# Patient Record
Sex: FEMALE | Race: BLACK OR AFRICAN AMERICAN | Employment: OTHER | URBAN - METROPOLITAN AREA
[De-identification: names, ages, dates, MRNs, and addresses within clinical notes are randomized per-mention and may not be internally consistent; named-entity substitution may affect disease eponyms.]

---

## 2017-01-24 RX ORDER — FLUTICASONE PROPIONATE 44 UG/1
AEROSOL, METERED RESPIRATORY (INHALATION)
Qty: 1 INHALER | Refills: 3 | Status: SHIPPED | OUTPATIENT
Start: 2017-01-24 | End: 2018-02-05 | Stop reason: SDUPTHER

## 2017-01-27 ENCOUNTER — HOSPITAL ENCOUNTER (OUTPATIENT)
Dept: LAB | Age: 62
Discharge: HOME | End: 2017-01-27
Payer: COMMERCIAL

## 2017-01-27 DIAGNOSIS — I10 ESSENTIAL HYPERTENSION WITH GOAL BLOOD PRESSURE LESS THAN 140/90: ICD-10-CM

## 2017-01-27 DIAGNOSIS — Z11.59 NEED FOR HEPATITIS C SCREENING TEST: ICD-10-CM

## 2017-01-27 DIAGNOSIS — E11.9 TYPE 2 DIABETES MELLITUS WITHOUT COMPLICATION (HCC): ICD-10-CM

## 2017-01-27 DIAGNOSIS — E78.00 HYPERCHOLESTEROLEMIA: ICD-10-CM

## 2017-01-27 LAB
ALBUMIN SERPL BCP-MCNC: 3.8 G/DL (ref 3.4–5)
ALBUMIN/GLOB SERPL: 1.1 {RATIO} (ref 0.8–1.7)
ALP SERPL-CCNC: 160 U/L (ref 45–117)
ALT SERPL-CCNC: 52 U/L (ref 13–56)
ANION GAP BLD CALC-SCNC: 12 MMOL/L (ref 3–18)
AST SERPL W P-5'-P-CCNC: 18 U/L (ref 15–37)
BILIRUB SERPL-MCNC: 0.3 MG/DL (ref 0.2–1)
BUN SERPL-MCNC: 28 MG/DL (ref 7–18)
BUN/CREAT SERPL: 28 (ref 12–20)
CALCIUM SERPL-MCNC: 9.4 MG/DL (ref 8.5–10.1)
CHLORIDE SERPL-SCNC: 102 MMOL/L (ref 100–108)
CHOLEST SERPL-MCNC: 233 MG/DL
CO2 SERPL-SCNC: 28 MMOL/L (ref 21–32)
CREAT SERPL-MCNC: 0.99 MG/DL (ref 0.6–1.3)
GLOBULIN SER CALC-MCNC: 3.5 G/DL (ref 2–4)
GLUCOSE SERPL-MCNC: 59 MG/DL (ref 74–99)
HBA1C MFR BLD: 9.9 % (ref 4.2–5.6)
HDLC SERPL-MCNC: 74 MG/DL (ref 40–60)
HDLC SERPL: 3.1 {RATIO} (ref 0–5)
LDLC SERPL CALC-MCNC: 139.6 MG/DL (ref 0–100)
LIPID PROFILE,FLP: ABNORMAL
POTASSIUM SERPL-SCNC: 4.2 MMOL/L (ref 3.5–5.5)
PROT SERPL-MCNC: 7.3 G/DL (ref 6.4–8.2)
SODIUM SERPL-SCNC: 142 MMOL/L (ref 136–145)
TRIGL SERPL-MCNC: 97 MG/DL (ref ?–150)
VLDLC SERPL CALC-MCNC: 19.4 MG/DL

## 2017-01-27 PROCEDURE — 86803 HEPATITIS C AB TEST: CPT | Performed by: INTERNAL MEDICINE

## 2017-01-27 PROCEDURE — 80053 COMPREHEN METABOLIC PANEL: CPT | Performed by: INTERNAL MEDICINE

## 2017-01-27 PROCEDURE — 82043 UR ALBUMIN QUANTITATIVE: CPT | Performed by: INTERNAL MEDICINE

## 2017-01-27 PROCEDURE — 80061 LIPID PANEL: CPT | Performed by: INTERNAL MEDICINE

## 2017-01-27 PROCEDURE — 36415 COLL VENOUS BLD VENIPUNCTURE: CPT | Performed by: INTERNAL MEDICINE

## 2017-01-27 PROCEDURE — 83036 HEMOGLOBIN GLYCOSYLATED A1C: CPT | Performed by: INTERNAL MEDICINE

## 2017-01-28 LAB
CREAT UR-MCNC: 137.36 MG/DL (ref 30–125)
MICROALBUMIN UR-MCNC: 1.2 MG/DL (ref 0–3)
MICROALBUMIN/CREAT UR-RTO: 9 MG/G (ref 0–30)

## 2017-01-30 LAB
HCV AB SER IA-ACNC: 0.03 INDEX
HCV AB SERPL QL IA: NEGATIVE
HCV COMMENT,HCGAC: NORMAL

## 2017-02-06 ENCOUNTER — OFFICE VISIT (OUTPATIENT)
Dept: INTERNAL MEDICINE CLINIC | Age: 62
End: 2017-02-06

## 2017-02-06 VITALS
HEART RATE: 83 BPM | RESPIRATION RATE: 18 BRPM | BODY MASS INDEX: 40.15 KG/M2 | HEIGHT: 65 IN | WEIGHT: 241 LBS | TEMPERATURE: 98.2 F | SYSTOLIC BLOOD PRESSURE: 111 MMHG | DIASTOLIC BLOOD PRESSURE: 53 MMHG | OXYGEN SATURATION: 98 %

## 2017-02-06 DIAGNOSIS — E55.9 VITAMIN D DEFICIENCY: ICD-10-CM

## 2017-02-06 DIAGNOSIS — I10 ESSENTIAL HYPERTENSION: Chronic | ICD-10-CM

## 2017-02-06 DIAGNOSIS — Z79.4 TYPE 2 DIABETES MELLITUS WITHOUT COMPLICATION, WITH LONG-TERM CURRENT USE OF INSULIN (HCC): Primary | Chronic | ICD-10-CM

## 2017-02-06 DIAGNOSIS — E11.9 TYPE 2 DIABETES MELLITUS WITHOUT COMPLICATION, WITH LONG-TERM CURRENT USE OF INSULIN (HCC): Primary | Chronic | ICD-10-CM

## 2017-02-06 DIAGNOSIS — E78.00 HYPERCHOLESTEROLEMIA: Chronic | ICD-10-CM

## 2017-02-06 RX ORDER — AZELASTINE 1 MG/ML
SPRAY, METERED NASAL
COMMUNITY
Start: 2017-01-27 | End: 2018-02-05 | Stop reason: SDUPTHER

## 2017-02-06 NOTE — MR AVS SNAPSHOT
Visit Information Date & Time Provider Department Dept. Phone Encounter #  
 2/6/2017 10:00 AM Everett Higgins MD [facility] [phone] Follow-up Instructions Return in about 6 months (around 8/6/2017) for labs 1 week before. Your Appointments 5/25/2017  8:30 AM  
LAB with Everett Higgins MD  
[facility] (--) Appt Note: cpe 1 year 700 [address],[facility] [address] [id number] [phone]  
  
   
 [address] [place] [address] [place] [address]  
  
    
 6/1/2017 10:00 AM  
COMPLETE PHYSICAL with Everett Higgins MD  
[facility] (--) Appt Note: cpe 1 year 700 [address],[facility] [address] [address]  
[phone]  
  
   
 [address] [place] [address] [place] [address] Upcoming Health Maintenance Date Due DTaP/Tdap/Td series (1 - Tdap) 8/23/1976 ZOSTER VACCINE AGE 60> 8/23/2015 FOOT EXAM Q1 5/18/2016 EYE EXAM RETINAL OR DILATED Q1 6/1/2017 HEMOGLOBIN A1C Q6M 7/27/2017 PAP AKA CERVICAL CYTOLOGY 12/9/2017 MICROALBUMIN Q1 1/27/2018 LIPID PANEL Q1 1/27/2018 BREAST CANCER SCRN MAMMOGRAM 10/12/2018 COLONOSCOPY 10/31/2019 Allergies as of 2/6/2017  Review Complete On: 2/6/2017 By: Everett Higgins MD  
 No Known Allergies Current Immunizations  Reviewed on 10/15/2014 No immunizations on file. Not reviewed this visit You Were Diagnosed With   
  
 Codes Comments Type 2 diabetes mellitus without complication, with long-term current use of insulin (HCC)    -  Primary ICD-10-CM: E11.9, Z79.4 ICD-9-CM: 250.00, V58.67 Hypercholesterolemia     ICD-10-CM: E78.00 ICD-9-CM: 272.0 Essential hypertension     ICD-10-CM: I10 
ICD-9-CM: 401.9 Vitamin D deficiency     ICD-10-CM: E55.9 ICD-9-CM: 268.9 Vitals BP Pulse Temp Resp Height(growth percentile) Weight(growth percentile) 111/53 (BP 1 Location: Left arm, BP Patient Position: Sitting) 83 98.2 °F (36.8 °C) (Oral) 18 5' 5\" (1.651 m) 241 lb (109.3 kg) SpO2 BMI OB Status Smoking Status 98% 40.1 kg/m2 Postmenopausal Never Smoker Vitals History BMI and BSA Data Body Mass Index Body Surface Area  
 40.1 kg/m 2 2.24 m 2 Preferred Pharmacy Pharmacy Name Phone [facility] [address] [facility], [address] Your Updated Medication List  
  
   
This list is accurate as of: 2/6/17 10:35 AM.  Always use your most recent med list.  
  
  
  
  
 albuterol 90 mcg/actuation inhaler Commonly known as:  PROAIR HFA Take 2 Puffs by inhalation every four (4) hours as needed for Wheezing. aspirin 81 mg tablet Take  by mouth daily. atorvastatin 80 mg tablet Commonly known as:  LIPITOR  
TAKE 1 TABLET BY MOUTH DAILY  
  
 azelastine 137 mcg (0.1 %) nasal spray Commonly known as:  ASTELIN  
  
 CALTRATE 600+D PLUS MINERALS 600 mg (1,500 mg)-400 unit Chew Generic drug:  Calcium Carbonate-Vit D3-Min Take 1 Tab by mouth daily. COMBIGAN 0.2-0.5 % Drop ophthalmic solution Generic drug:  brimonidine-timolol  
  
 cpap machine kit  
by Does Not Apply route. 1  Overnight CPAP at 11 CWP with ramp humidifier. 2  Mask: Silva and Pykel Aristides or mask of choice. Supplies 99 month. Please send compliance data [id number]. Diagnosis VIKKI. AHI 17 RDI 18 FLOVENT HFA 44 mcg/actuation inhaler Generic drug:  fluticasone INHALE TWO PUFFS BY MOUTH TWICE DAILY  
  
 lisinopril-hydroCHLOROthiazide 10-12.5 mg per tablet Commonly known as:  PRINZIDE, ZESTORETIC  
TAKE 1 TABELT BY MOUTH TWICE DAILY METANX (ALGAL OIL) 3 mg-35 mg-2 mg -90.314 mg Cap Generic drug:  levomefolate-B6-meB12-algal oil METANX 2.8-2-25 mg Tab Generic drug:  l-methylfolate-vit b12-vit b6 Take 1-2 Tabs by mouth daily as needed. NovoLOG Mix 70-30 100 unit/mL (70-30) injection Generic drug:  insulin aspart protamine/insulin aspart  
by SubCUTAneous route. 54 units SQ in AM and 40 units im PM daily before meals. Prescribed by Lucie Lindsay NP Follow-up Instructions Return in about 6 months (around 8/6/2017) for labs 1 week before. Patient Instructions Diabetes Foot Health: Care Instructions Your Care Instructions When you have diabetes, your feet need extra care and attention. Diabetes can damage the nerve endings and blood vessels in your feet, making you less likely to notice when your feet are injured. Diabetes also limits your body's ability to fight infection and get blood to areas that need it. If you get a minor foot injury, it could become an ulcer or a serious infection. With good foot care, you can prevent most of these problems. Caring for your feet can be quick and easy. Most of the care can be done when you are bathing or getting ready for bed. Follow-up care is a key part of your treatment and safety. Be sure to make and go to all appointments, and call your doctor if you are having problems. Its also a good idea to know your test results and keep a list of the medicines you take. How can you care for yourself at home? · Keep your blood sugar close to normal by watching what and how much you eat, monitoring blood sugar, taking medicines if prescribed, and getting regular exercise. · Do not smoke. Smoking affects blood flow and can make foot problems worse. If you need help quitting, talk to your doctor about stop-smoking programs and medicines. These can increase your chances of quitting for good. · Eat a diet that is low in fats. High fat intake can cause fat to build up in your blood vessels and decrease blood flow. · Inspect your feet daily for blisters, cuts, cracks, or sores. If you cannot see well, use a mirror or have someone help you. · Take care of your feet: ¨ Wash your feet every day. Use warm (not hot) water. Check the water temperature with your wrists or other part of your body, not your feet. ¨ Dry your feet well. Pat them dry. Do not rub the skin on your feet too hard. Dry well between your toes. If the skin on your feet stays moist, bacteria or a fungus can grow, which can lead to infection. ¨ Keep your skin soft. Use moisturizing skin cream to keep the skin on your feet soft and prevent calluses and cracks. But do not put the cream between your toes, and stop using any cream that causes a rash. ¨ Clean underneath your toenails carefully. Do not use a sharp object to clean underneath your toenails. Use the blunt end of a nail file or other rounded tool. ¨ Trim and file your toenails straight across to prevent ingrown toenails. Use a nail clipper, not scissors. Use an emery board to smooth the edges. · Change socks daily. Socks without seams are best, because seams often rub the feet. You can find socks for people with diabetes from specialty catalogs. · Look inside your shoes every day for things like gravel or torn linings, which could cause blisters or sores. · Buy shoes that fit well: 
¨ Look for shoes that have plenty of space around the toes. This helps prevent bunions and blisters. ¨ Try on shoes while wearing the kind of socks you will usually wear with the shoes. ¨ Avoid plastic shoes. They may rub your feet and cause blisters. Good shoes should be made of materials that are flexible and breathable, such as leather or cloth. ¨ Break in new shoes slowly by wearing them for no more than an hour a day for several days. Take extra time to check your feet for red areas, blisters, or other problems after you wear new shoes. · Do not go barefoot. Do not wear sandals, and do not wear shoes with very thin soles. Thin soles are easy to puncture. They also do not protect your feet from hot pavement or cold weather. · Have your doctor check your feet during each visit. If you have a foot problem, see your doctor. Do not try to treat an early foot problem at home. Home remedies or treatments that you can buy without a prescription (such as corn removers) can be harmful. · Always get early treatment for foot problems. A minor irritation can lead to a major problem if not properly cared for early. When should you call for help? Call your doctor now or seek immediate medical care if: 
· You have a foot sore, an ulcer or break in the skin that is not healing after 4 days, bleeding corns or calluses, or an ingrown toenail. · You have blue or black areas, which can mean bruising or blood flow problems. · You have peeling skin or tiny blisters between your toes or cracking or oozing of the skin. · You have a fever for more than 24 hours and a foot sore. · You have new numbness or tingling in your feet that does not go away after you move your feet or change positions. · You have unexplained or unusual swelling of the foot or ankle. Watch closely for changes in your health, and be sure to contact your doctor if: 
· You cannot do proper foot care. Where can you learn more? Go to http://niko-shira.info/. Enter A739 in the search box to learn more about \"Diabetes Foot Health: Care Instructions. \" Current as of: May 23, 2016 Content Version: 11.1 © 3168-9955 [employer]FI. Care instructions adapted under license by [employer] (which disclaims liability or warranty for this information). If you have questions about a medical condition or this instruction, always ask your healthcare professional. [address] any warranty or liability for your use of this information. Introducing [facility]! [facility] introduces EVIIVO patient portal. Now you can access parts of your medical record, email your doctor's office, and request medication refills online. 1. In your internet browser, go to [email or web address] 2. Click on the First Time User? Click Here link in the Sign In box. You will see the New Member Sign Up page. 3. Enter your Little1 Access Code exactly as it appears below. You will not need to use this code after youve completed the sign-up process. If you do not sign up before the expiration date, you must request a new code. · Little1 Access Code: [email or web address] Expires: 5/7/2017 10:01 AM 
 
4. Enter the last four digits of your Social Security Number (xxxx) and Date of Birth (mm/dd/yyyy) as indicated and click Submit. You will be taken to the next sign-up page. 5. Create a [employer] ID. This will be your Little1 login ID and cannot be changed, so think of one that is secure and easy to remember. 6. Create a Little1 password. You can change your password at any time. 7. Enter your Password Reset Question and Answer. This can be used at a later time if you forget your password. 8. Enter your e-mail address. You will receive e-mail notification when new information is available in [address] 9. Click Sign Up. You can now view and download portions of your medical record. 10. Click the Download Summary menu link to download a portable copy of your medical information. If you have questions, please visit the Frequently Asked Questions section of the Little1 website. Remember, Little1 is NOT to be used for urgent needs. For medical emergencies, dial 911. Now available from your iPhone and Android! Please provide this summary of care documentation to your next provider. Your primary care clinician is listed as BEA GENTILE. If you have any questions after today's visit, please call [phone].

## 2017-02-06 NOTE — PATIENT INSTRUCTIONS
Diabetes Foot Health: Care Instructions  Your Care Instructions    When you have diabetes, your feet need extra care and attention. Diabetes can damage the nerve endings and blood vessels in your feet, making you less likely to notice when your feet are injured. Diabetes also limits your body's ability to fight infection and get blood to areas that need it. If you get a minor foot injury, it could become an ulcer or a serious infection. With good foot care, you can prevent most of these problems. Caring for your feet can be quick and easy. Most of the care can be done when you are bathing or getting ready for bed. Follow-up care is a key part of your treatment and safety. Be sure to make and go to all appointments, and call your doctor if you are having problems. Its also a good idea to know your test results and keep a list of the medicines you take. How can you care for yourself at home? · Keep your blood sugar close to normal by watching what and how much you eat, monitoring blood sugar, taking medicines if prescribed, and getting regular exercise. · Do not smoke. Smoking affects blood flow and can make foot problems worse. If you need help quitting, talk to your doctor about stop-smoking programs and medicines. These can increase your chances of quitting for good. · Eat a diet that is low in fats. High fat intake can cause fat to build up in your blood vessels and decrease blood flow. · Inspect your feet daily for blisters, cuts, cracks, or sores. If you cannot see well, use a mirror or have someone help you. · Take care of your feet:  [facility] your feet every day. Use warm (not hot) water. Check the water temperature with your wrists or other part of your body, not your feet. ¨ Dry your feet well. Pat them dry. Do not rub the skin on your feet too hard. Dry well between your toes. If the skin on your feet stays moist, bacteria or a fungus can grow, which can lead to infection.   ¨ Keep your skin soft. Use moisturizing skin cream to keep the skin on your feet soft and prevent calluses and cracks. But do not put the cream between your toes, and stop using any cream that causes a rash. ¨ Clean underneath your toenails carefully. Do not use a sharp object to clean underneath your toenails. Use the blunt end of a nail file or other rounded tool. ¨ Trim and file your toenails straight across to prevent ingrown toenails. Use a nail clipper, not scissors. Use an emery board to smooth the edges. · Change socks daily. Socks without seams are best, because seams often rub the feet. You can find socks for people with diabetes from specialty catalogs. · Look inside your shoes every day for things like gravel or torn linings, which could cause blisters or sores. · Buy shoes that fit well:  ¨ Look for shoes that have plenty of space around the toes. This helps prevent bunions and blisters. ¨ Try on shoes while wearing the kind of socks you will usually wear with the shoes. ¨ Avoid plastic shoes. They may rub your feet and cause blisters. Good shoes should be made of materials that are flexible and breathable, such as leather or cloth. ¨ Break in new shoes slowly by wearing them for no more than an hour a day for several days. Take extra time to check your feet for red areas, blisters, or other problems after you wear new shoes. · Do not go barefoot. Do not wear sandals, and do not wear shoes with very thin soles. Thin soles are easy to puncture. They also do not protect your feet from hot pavement or cold weather. · Have your doctor check your feet during each visit. If you have a foot problem, see your doctor. Do not try to treat an early foot problem at home. Home remedies or treatments that you can buy without a prescription (such as corn removers) can be harmful. · Always get early treatment for foot problems. A minor irritation can lead to a major problem if not properly cared for early.   When should you call for help? Call your doctor now or seek immediate medical care if:  · You have a foot sore, an ulcer or break in the skin that is not healing after 4 days, bleeding corns or calluses, or an ingrown toenail. · You have blue or black areas, which can mean bruising or blood flow problems. · You have peeling skin or tiny blisters between your toes or cracking or oozing of the skin. · You have a fever for more than 24 hours and a foot sore. · You have new numbness or tingling in your feet that does not go away after you move your feet or change positions. · You have unexplained or unusual swelling of the foot or ankle. Watch closely for changes in your health, and be sure to contact your doctor if:  · You cannot do proper foot care. Where can you learn more? Go to http://niko-shira.info/. Enter A739 in the search box to learn more about \"Diabetes Foot Health: Care Instructions. \"  Current as of: May 23, 2016  Content Version: 11.1  © 1921-9000 Healthwise[employer]. Care instructions adapted under license by [employer] (which disclaims liability or warranty for this information). If you have questions about a medical condition or this instruction, always ask your healthcare professional. [address] any warranty or liability for your use of this information.

## 2017-02-06 NOTE — PROGRESS NOTES
Patient is in the office today for a 6 month follow up. Do you have an Advance Directive no  Do you want more information : information was previously given. 1. Have you been to the ER, urgent care clinic since your last visit? Hospitalized since your last visit? No    2. Have you seen or consulted any other health care providers outside of the [address] [place] [address] since your last visit? Include any pap smears or colon screening. Yes, Dr. Gaston Shells ENT, and Dr. Jeevan Herrera.

## 2017-02-07 NOTE — PROGRESS NOTES
Subjective:       Chief Complaint  The patient presents for follow up of diabetes, hypertension and high cholesterol. KRIS Jean is a 64 y.o. female seen for follow up of diabetes. Shealso has hypertension and hyperlipidemia. Diabetes poorly controlled, on multiple meds including, novolog 70/30  Insulin, pt is already followed by Endo, pt compliance is poor with pt missing 3/14 insulin doses/week and probably even more, she continues to struggle to try and lose weight which would help bring down her Hba1c from 9.9 which it is currently, hypertension well controlled, no significant medication side effects noted, on Zestoretic, hyperlipidemia poorly controlled, on Lipitor having leg cramps when she takes it daily. She therefore takes it 3x/week. Pt may be candidate for crestor 5 mg if she cannot get LDL down further with weight loss. Diet and Lifestyle: not attempting to follow a low fat, low cholesterol diet, does not rigorously follow a diabetic diet, exercises sporadically    Home BP Monitoring: is not measured at home. Diabetic Review of Systems - home glucose monitoring: is performed regularly, 3x/day. Other symptoms and concerns: pt needs to work on losing weight to improve her overall health     Vit D deficiency is well controlled on current supplementation. Pt continues to have knee pain due to arthritis. Discussed the patient's BMI with her. The BMI follow up plan is as follows: BMI is out of normal parameters and plan is as follows: I have counseled this patient on diet and exercise regimens. Current Outpatient Prescriptions   Medication Sig    azelastine (ASTELIN) 137 mcg (0.1 %) nasal spray     FLOVENT HFA 44 mcg/actuation inhaler INHALE TWO PUFFS BY MOUTH TWICE DAILY    atorvastatin (LIPITOR) 80 mg tablet TAKE 1 TABLET BY MOUTH DAILY    cpap machine kit by Does Not Apply route. 1  Overnight CPAP at 11 CWP with ramp humidifier.   2  Mask: Silva and Pykel Aristides or mask of choice. Supplies 99 month. Please send compliance data [id number]. Diagnosis VIKKI. AHI 17 RDI 18    METANX, ALGAL OIL, 3 mg-35 mg-2 mg -90.314 mg cap     COMBIGAN 0.2-0.5 % drop ophthalmic solution     lisinopril-hydrochlorothiazide (PRINZIDE, ZESTORETIC) 10-12.5 mg per tablet TAKE 1 TABELT BY MOUTH TWICE DAILY    insulin aspart protamine (NOVOLOG MIX 70-30) 100 unit/mL (70-30) injection by SubCUTAneous route. 54 units SQ in AM and 40 units im PM daily before meals. Prescribed by Alejandro Bran NP    l-methylfolate-vit b12-vit b6 (METANX) 2.8-2-25 mg Tab Take 1-2 Tabs by mouth daily as needed.  Calcium Carbonate-Vit D3-Min (CALTRATE 600+D PLUS MINERALS) 600 mg (1,500 mg)-400 unit Chew Take 1 Tab by mouth daily.  aspirin 81 mg Tab Take  by mouth daily.  albuterol (PROAIR HFA) 90 mcg/actuation inhaler Take 2 Puffs by inhalation every four (4) hours as needed for Wheezing. No current facility-administered medications for this visit.               Review of Systems  Respiratory: negative for dyspnea on exertion  Cardiovascular: negative for chest pain    Objective:     Visit Vitals    /53 (BP 1 Location: Left arm, BP Patient Position: Sitting)    Pulse 83    Temp 98.2 °F (36.8 °C) (Oral)    Resp 18    Ht 5' 5\" (1.651 m)    Wt 241 lb (109.3 kg)    SpO2 98%    BMI 40.1 kg/m2        General appearance - alert, well appearing, and in no distress  Chest - clear to auscultation, no wheezes, rales or rhonchi, symmetric air entry  Heart - normal rate, regular rhythm, normal S1, S2, no murmurs, rubs, clicks or gallops  Extremities - peripheral pulses normal, no pedal edema, no clubbing or cyanosis      Labs:   Lab Results  Component Value Date/Time   Hemoglobin A1c 9.9 01/27/2017 07:58 AM   Glucose 59 01/27/2017 07:58 AM   Microalbumin/Creat ratio (mg/g creat) 9 01/27/2017 07:58 AM   Microalb/Creat ratio (ug/mg creat.) <2.8 09/16/2015 02:48 PM   Microalbumin,urine random 1.20 01/27/2017 07:58 AM   LDL, calculated 139.6 01/27/2017 07:58 AM   Creatinine (POC) 1.5 01/07/2015 09:31 AM   Creatinine 0.99 01/27/2017 07:58 AM      Lab Results  Component Value Date/Time   Cholesterol, total 233 01/27/2017 07:58 AM   HDL Cholesterol 74 01/27/2017 07:58 AM   LDL, calculated 139.6 01/27/2017 07:58 AM   Triglyceride 97 01/27/2017 07:58 AM   CHOL/HDL Ratio 3.1 01/27/2017 07:58 AM       Lab Results   Component Value Date/Time    Sodium 142 01/27/2017 07:58 AM    Potassium 4.2 01/27/2017 07:58 AM    Chloride 102 01/27/2017 07:58 AM    CO2 28 01/27/2017 07:58 AM    Anion gap 12 01/27/2017 07:58 AM    Glucose 59 01/27/2017 07:58 AM    BUN 28 01/27/2017 07:58 AM    Creatinine 0.99 01/27/2017 07:58 AM    BUN/Creatinine ratio 28 01/27/2017 07:58 AM    GFR est AA >60 01/27/2017 07:58 AM    GFR est non-AA 57 01/27/2017 07:58 AM    Calcium 9.4 01/27/2017 07:58 AM    Bilirubin, total 0.3 01/27/2017 07:58 AM    ALT (SGPT) 52 01/27/2017 07:58 AM    AST (SGOT) 18 01/27/2017 07:58 AM    Alk. phosphatase 160 01/27/2017 07:58 AM    Protein, total 7.3 01/27/2017 07:58 AM    Albumin 3.8 01/27/2017 07:58 AM    Globulin 3.5 01/27/2017 07:58 AM    A-G Ratio 1.1 01/27/2017 07:58 AM              Assessment / Plan     Diabetes poorly controlled, on current meds and followed by Endo. Pt will continue to try and lose 10 t 20 lbs, compliance with meds has been poor. Hypertension well controlled, on Zestoretic   Hyperlipidemia poorly controlled, on Lipitor due to poor tolerance. Taking every other day. If not improved by next OV would try crestor 5 mg. ICD-10-CM ICD-9-CM    1. Type 2 diabetes mellitus without complication, with long-term current use of insulin (HCC) E11.9 250.00 HEMOGLOBIN A1C W/O EAG    Z79.4 V58.67    2. Hypercholesterolemia E78.00 272.0 LIPID PANEL   3. Essential hypertension N88 904.0 METABOLIC PANEL, COMPREHENSIVE   4.  Vitamin D deficiency E55.9 268.9 Well controlled on current supplementation VITAMIN D, 25 HYDROXY                Diabetic issues reviewed with her: diabetic diet discussed in detail, and low cholesterol diet, weight control and daily exercise discussed. Follow-up Disposition:  Return in about 6 months (around 8/6/2017) for labs 1 week before. Reviewed plan of care. Patient has provided input and agrees with goals.

## 2017-04-11 ENCOUNTER — TELEPHONE (OUTPATIENT)
Dept: INTERNAL MEDICINE CLINIC | Age: 62
End: 2017-04-11

## 2017-04-11 DIAGNOSIS — Z12.31 ENCOUNTER FOR SCREENING MAMMOGRAM FOR BREAST CANCER: Primary | ICD-10-CM

## 2017-04-12 ENCOUNTER — TELEPHONE (OUTPATIENT)
Dept: INTERNAL MEDICINE CLINIC | Age: 62
End: 2017-04-12

## 2017-04-12 DIAGNOSIS — Z12.31 ENCOUNTER FOR SCREENING MAMMOGRAM FOR BREAST CANCER: Primary | ICD-10-CM

## 2017-04-12 NOTE — TELEPHONE ENCOUNTER
[facility] [employer] states the order that was just sent has an expiration date of 10/10/2017. Scheduling states patient is scheduled for 10/13/2017 and wanted to know if a new order can be sent. Scheduling states she cant use that order because of the expiration date.

## 2017-05-23 LAB — HBA1C MFR BLD HPLC: NORMAL %

## 2017-07-31 ENCOUNTER — HOSPITAL ENCOUNTER (OUTPATIENT)
Dept: LAB | Age: 62
Discharge: HOME | End: 2017-07-31
Payer: COMMERCIAL

## 2017-07-31 DIAGNOSIS — E55.9 VITAMIN D DEFICIENCY: ICD-10-CM

## 2017-07-31 DIAGNOSIS — E11.9 TYPE 2 DIABETES MELLITUS WITHOUT COMPLICATION, WITH LONG-TERM CURRENT USE OF INSULIN (HCC): Chronic | ICD-10-CM

## 2017-07-31 DIAGNOSIS — I10 ESSENTIAL HYPERTENSION: Chronic | ICD-10-CM

## 2017-07-31 DIAGNOSIS — E78.00 HYPERCHOLESTEROLEMIA: Chronic | ICD-10-CM

## 2017-07-31 DIAGNOSIS — Z79.4 TYPE 2 DIABETES MELLITUS WITHOUT COMPLICATION, WITH LONG-TERM CURRENT USE OF INSULIN (HCC): Chronic | ICD-10-CM

## 2017-07-31 LAB
25(OH)D3 SERPL-MCNC: 30.7 NG/ML (ref 30–100)
ALBUMIN SERPL BCP-MCNC: 3.7 G/DL (ref 3.4–5)
ALBUMIN/GLOB SERPL: 0.9 {RATIO} (ref 0.8–1.7)
ALP SERPL-CCNC: 149 U/L (ref 45–117)
ALT SERPL-CCNC: 26 U/L (ref 13–56)
ANION GAP BLD CALC-SCNC: 10 MMOL/L (ref 3–18)
AST SERPL W P-5'-P-CCNC: 17 U/L (ref 15–37)
BILIRUB SERPL-MCNC: 0.3 MG/DL (ref 0.2–1)
BUN SERPL-MCNC: 26 MG/DL (ref 7–18)
BUN/CREAT SERPL: 23 (ref 12–20)
CALCIUM SERPL-MCNC: 9.6 MG/DL (ref 8.5–10.1)
CHLORIDE SERPL-SCNC: 100 MMOL/L (ref 100–108)
CHOLEST SERPL-MCNC: 276 MG/DL
CO2 SERPL-SCNC: 29 MMOL/L (ref 21–32)
CREAT SERPL-MCNC: 1.12 MG/DL (ref 0.6–1.3)
GLOBULIN SER CALC-MCNC: 3.9 G/DL (ref 2–4)
GLUCOSE SERPL-MCNC: 55 MG/DL (ref 74–99)
HBA1C MFR BLD: 9.8 % (ref 4.2–5.6)
HDLC SERPL-MCNC: 74 MG/DL (ref 40–60)
HDLC SERPL: 3.7 {RATIO} (ref 0–5)
LDLC SERPL CALC-MCNC: 167 MG/DL (ref 0–100)
LIPID PROFILE,FLP: ABNORMAL
POTASSIUM SERPL-SCNC: 4.7 MMOL/L (ref 3.5–5.5)
PROT SERPL-MCNC: 7.6 G/DL (ref 6.4–8.2)
SODIUM SERPL-SCNC: 139 MMOL/L (ref 136–145)
TRIGL SERPL-MCNC: 175 MG/DL (ref ?–150)
VLDLC SERPL CALC-MCNC: 35 MG/DL

## 2017-07-31 PROCEDURE — 80053 COMPREHEN METABOLIC PANEL: CPT | Performed by: INTERNAL MEDICINE

## 2017-07-31 PROCEDURE — 82306 VITAMIN D 25 HYDROXY: CPT | Performed by: INTERNAL MEDICINE

## 2017-07-31 PROCEDURE — 83036 HEMOGLOBIN GLYCOSYLATED A1C: CPT | Performed by: INTERNAL MEDICINE

## 2017-07-31 PROCEDURE — 80061 LIPID PANEL: CPT | Performed by: INTERNAL MEDICINE

## 2017-07-31 PROCEDURE — 36415 COLL VENOUS BLD VENIPUNCTURE: CPT | Performed by: INTERNAL MEDICINE

## 2017-08-07 ENCOUNTER — OFFICE VISIT (OUTPATIENT)
Dept: INTERNAL MEDICINE CLINIC | Age: 62
End: 2017-08-07

## 2017-08-07 VITALS
WEIGHT: 240 LBS | RESPIRATION RATE: 18 BRPM | DIASTOLIC BLOOD PRESSURE: 57 MMHG | OXYGEN SATURATION: 97 % | BODY MASS INDEX: 39.99 KG/M2 | TEMPERATURE: 98.2 F | HEIGHT: 65 IN | SYSTOLIC BLOOD PRESSURE: 128 MMHG | HEART RATE: 77 BPM

## 2017-08-07 DIAGNOSIS — E78.00 HYPERCHOLESTEROLEMIA: Chronic | ICD-10-CM

## 2017-08-07 DIAGNOSIS — Z79.4 TYPE 2 DIABETES MELLITUS WITHOUT COMPLICATION, WITH LONG-TERM CURRENT USE OF INSULIN (HCC): Primary | Chronic | ICD-10-CM

## 2017-08-07 DIAGNOSIS — E55.9 VITAMIN D DEFICIENCY: ICD-10-CM

## 2017-08-07 DIAGNOSIS — I10 ESSENTIAL HYPERTENSION: Chronic | ICD-10-CM

## 2017-08-07 DIAGNOSIS — E11.9 TYPE 2 DIABETES MELLITUS WITHOUT COMPLICATION, WITH LONG-TERM CURRENT USE OF INSULIN (HCC): Primary | Chronic | ICD-10-CM

## 2017-08-07 RX ORDER — BETAMETHASONE DIPROPIONATE 0.5 MG/G
CREAM TOPICAL
COMMUNITY
Start: 2017-06-27 | End: 2018-02-05

## 2017-08-07 NOTE — PROGRESS NOTES
Patient is in the office today for a 6 month follow up. 1. Have you been to the ER, urgent care clinic since your last visit? Hospitalized since your last visit? No    2. Have you seen or consulted any other health care providers outside of the [address] [place] [address] since your last visit? Include any pap smears or colon screening. Yes, Dr. Dashawn Garcia OB/GYN.

## 2017-08-07 NOTE — PROGRESS NOTES
Subjective:       Chief Complaint  The patient presents for follow up of diabetes, hypertension and high cholesterol. KRIS Bowers is a 64 y.o. female seen for follow up of diabetes. Shealso has hypertension and hyperlipidemia. Diabetes poorly controlled, on multiple meds including, novolog 70/30  Insulin 50 units QAM and 40 units QPM, pt is already followed by Endo(Dr Schuster), pt compliance is poor with pt missing 3/14 insulin doses/week and probably even more, she continues to struggle to try and lose weight which would help bring down her Hba1c from 9.9 which it is currently, Endo recommends and insulin pump but pt is reluctant to do it, I also advised er to consider Lolaabiodun Tilley, hypertension well controlled, no significant medication side effects noted, on Zestoretic, hyperlipidemia poorly controlled, on Lipitor having leg cramps when she takes it daily. She therefore takes it 3x/week. Pt may be candidate for crestor 5 mg if she cannot get LDL down further with weight loss. Also conisder adding Co Q 10      Diet and Lifestyle: not attempting to follow a low fat, low cholesterol diet, does not rigorously follow a diabetic diet, exercises sporadically    Home BP Monitoring: is not measured at home. Diabetic Review of Systems - home glucose monitoring: is performed regularly, 3x/day. Other symptoms and concerns: pt needs to work on losing weight to improve her overall health     Vit D deficiency is well controlled on current supplementation. Pt continues to have knee pain due to arthritis. Discussed the patient's BMI with her. The BMI follow up plan is as follows: BMI is out of normal parameters and plan is as follows: I have counseled this patient on diet and exercise regimens.       Current Outpatient Prescriptions   Medication Sig    FLOVENT HFA 44 mcg/actuation inhaler INHALE TWO PUFFS BY MOUTH TWICE DAILY    atorvastatin (LIPITOR) 80 mg tablet TAKE 1 TABLET BY MOUTH DAILY  cpap machine kit by Does Not Apply route. 1  Overnight CPAP at 11 CWP with ramp humidifier. 2  Mask: Silva and Pykel Aristides or mask of choice. Supplies 99 month. Please send compliance data [id number]. Diagnosis VIKKI. AHI 17 RDI 18    COMBIGAN 0.2-0.5 % drop ophthalmic solution     lisinopril-hydrochlorothiazide (PRINZIDE, ZESTORETIC) 10-12.5 mg per tablet TAKE 1 TABELT BY MOUTH TWICE DAILY    insulin aspart protamine (NOVOLOG MIX 70-30) 100 unit/mL (70-30) injection by SubCUTAneous route. 54 units SQ in AM and 40 units im PM daily before meals. Prescribed by Brannon Orozco NP    Calcium Carbonate-Vit D3-Min (CALTRATE 600+D PLUS MINERALS) 600 mg (1,500 mg)-400 unit Chew Take 1 Tab by mouth daily.  aspirin 81 mg Tab Take  by mouth daily.  betamethasone dipropionate (DIPROSONE) 0.05 % topical cream     azelastine (ASTELIN) 137 mcg (0.1 %) nasal spray     METANX, ALGAL OIL, 3 mg-35 mg-2 mg -90.314 mg cap     albuterol (PROAIR HFA) 90 mcg/actuation inhaler Take 2 Puffs by inhalation every four (4) hours as needed for Wheezing.  l-methylfolate-vit b12-vit b6 (METANX) 2.8-2-25 mg Tab Take 1-2 Tabs by mouth daily as needed. No current facility-administered medications for this visit.               Review of Systems  Respiratory: negative for dyspnea on exertion  Cardiovascular: negative for chest pain    Objective:     Visit Vitals    /57 (BP 1 Location: Left arm, BP Patient Position: Sitting)    Pulse 77    Temp 98.2 °F (36.8 °C) (Oral)    Resp 18    Ht 5' 5\" (1.651 m)    Wt 240 lb (108.9 kg)    SpO2 97%    BMI 39.94 kg/m2        General appearance - alert, well appearing, and in no distress  Chest - clear to auscultation, no wheezes, rales or rhonchi, symmetric air entry  Heart - normal rate, regular rhythm, normal S1, S2, no murmurs, rubs, clicks or gallops  Extremities - peripheral pulses normal, no pedal edema, no clubbing or cyanosis      Labs:   Lab Results   Component Value Date/Time    Hemoglobin A1c 9.8 07/31/2017 08:09 AM    Hemoglobin A1c 9.9 01/27/2017 07:58 AM    Hemoglobin A1c, External 10.1 % 05/23/2017    Glucose 55 07/31/2017 08:09 AM    Microalbumin/Creat ratio (mg/g creat) 9 01/27/2017 07:58 AM    Microalbumin,urine random 1.20 01/27/2017 07:58 AM    LDL, calculated 167 07/31/2017 08:09 AM    Creatinine, POC 1.5 01/07/2015 09:31 AM    Creatinine 1.12 07/31/2017 08:09 AM      Lab Results   Component Value Date/Time    Cholesterol, total 276 07/31/2017 08:09 AM    HDL Cholesterol 74 07/31/2017 08:09 AM    LDL, calculated 167 07/31/2017 08:09 AM    Triglyceride 175 07/31/2017 08:09 AM    CHOL/HDL Ratio 3.7 07/31/2017 08:09 AM       Lab Results   Component Value Date/Time    Sodium 139 07/31/2017 08:09 AM    Potassium 4.7 07/31/2017 08:09 AM    Chloride 100 07/31/2017 08:09 AM    CO2 29 07/31/2017 08:09 AM    Anion gap 10 07/31/2017 08:09 AM    Glucose 55 07/31/2017 08:09 AM    BUN 26 07/31/2017 08:09 AM    Creatinine 1.12 07/31/2017 08:09 AM    BUN/Creatinine ratio 23 07/31/2017 08:09 AM    GFR est AA 60 07/31/2017 08:09 AM    GFR est non-AA 49 07/31/2017 08:09 AM    Calcium 9.6 07/31/2017 08:09 AM    Bilirubin, total 0.3 07/31/2017 08:09 AM    ALT (SGPT) 26 07/31/2017 08:09 AM    AST (SGOT) 17 07/31/2017 08:09 AM    Alk. phosphatase 149 07/31/2017 08:09 AM    Protein, total 7.6 07/31/2017 08:09 AM    Albumin 3.7 07/31/2017 08:09 AM    Globulin 3.9 07/31/2017 08:09 AM    A-G Ratio 0.9 07/31/2017 08:09 AM              Assessment / Plan     Diabetes poorly controlled, on current meds and followed by Endo. Pt will continue to try and lose 10 to 20 lbs, try to be more compliant and consider Jardiance if not improving  Hypertension well controlled, on Zestoretic   Hyperlipidemia poorly controlled, on Lipitor due to poor tolerance. Taking every other day. If not improved by next OV would try crestor 5 mg and CoQ 10        ICD-10-CM ICD-9-CM    1.  Type 2 diabetes mellitus without complication, with long-term current use of insulin (HCC) E11.9 250.00 HEMOGLOBIN A1C W/O EAG    Z79.4 V58.67 MICROALBUMIN, UR, RAND W/ MICROALBUMIN/CREA RATIO   2. Essential hypertension N88 349.6 METABOLIC PANEL, COMPREHENSIVE   3. Hypercholesterolemia E78.00 272.0 LIPID PANEL   4. Vitamin D deficiency E55.9 268.9 Well controlled on current supplementation VITAMIN D, 25 HYDROXY                Diabetic issues reviewed with her: diabetic diet discussed in detail, and low cholesterol diet, weight control and daily exercise discussed. Follow-up Disposition:  Return in about 6 months (around 2/7/2018) for labs 1 week before. Reviewed plan of care. Patient has provided input and agrees with goals.

## 2017-08-07 NOTE — MR AVS SNAPSHOT
Visit Information Date & Time Provider Department Dept. Phone Encounter #  
 8/7/2017 10:00 AM Neo Hernández MD [facility] at [facility] [id number] Follow-up Instructions Return in about 6 months (around 2/7/2018) for labs 1 week before. Upcoming Health Maintenance Date Due DTaP/Tdap/Td series (1 - Tdap) 8/23/1976 ZOSTER VACCINE AGE 60> 6/23/2015 FOOT EXAM Q1 5/18/2016 EYE EXAM RETINAL OR DILATED Q1 6/1/2017 PAP AKA CERVICAL CYTOLOGY 12/9/2017 MICROALBUMIN Q1 1/27/2018 HEMOGLOBIN A1C Q6M 1/31/2018 LIPID PANEL Q1 7/31/2018 BREAST CANCER SCRN MAMMOGRAM 10/12/2018 COLONOSCOPY 10/31/2019 Allergies as of 8/7/2017  Review Complete On: 8/7/2017 By: Neo Hernández MD  
 No Known Allergies Current Immunizations  Reviewed on 10/15/2014 No immunizations on file. Not reviewed this visit You Were Diagnosed With   
  
 Codes Comments Type 2 diabetes mellitus without complication, with long-term current use of insulin (HCC)    -  Primary ICD-10-CM: E11.9, Z79.4 ICD-9-CM: 250.00, V58.67 Essential hypertension     ICD-10-CM: I10 
ICD-9-CM: 401.9 Hypercholesterolemia     ICD-10-CM: E78.00 ICD-9-CM: 272.0 Vitamin D deficiency     ICD-10-CM: E55.9 ICD-9-CM: 268.9 Vitals BP Pulse Temp Resp Height(growth percentile) Weight(growth percentile) 128/57 (BP 1 Location: Left arm, BP Patient Position: Sitting) 77 98.2 °F (36.8 °C) (Oral) 18 5' 5\" (1.651 m) 240 lb (108.9 kg) SpO2 BMI OB Status Smoking Status 97% 39.94 kg/m2 Postmenopausal Never Smoker Vitals History BMI and BSA Data Body Mass Index Body Surface Area  
 39.94 kg/m 2 2.23 m 2 Preferred Pharmacy Pharm[facility] Name Phone [facility] [address] [facility] Your Updated Medication List  
  
   
 This list is accurate as of: 8/7/17 10:12 AM.  Always use your most recent med list.  
  
  
  
  
 albuterol 90 mcg/actuation inhaler Commonly known as:  PROAIR HFA Take 2 Puffs by inhalation every four (4) hours as needed for Wheezing. aspirin 81 mg tablet Take  by mouth daily. atorvastatin 80 mg tablet Commonly known as:  LIPITOR  
TAKE 1 TABLET BY MOUTH DAILY  
  
 azelastine 137 mcg (0.1 %) nasal spray Commonly known as:  ASTELIN  
  
 betamethasone dipropionate 0.05 % topical cream  
Commonly known as:  DIPROSONE  
  
 CALTRATE 600+D PLUS MINERALS 600 mg (1,500 mg)-400 unit Chew Generic drug:  Calcium Carbonate-Vit D3-Min Take 1 Tab by mouth daily. COMBIGAN 0.2-0.5 % Drop ophthalmic solution Generic drug:  brimonidine-timolol  
  
 cpap machine kit  
by Does Not Apply route. 1  Overnight CPAP at 11 CWP with ramp humidifier. 2  Mask: Silva and Pykel Aristides or mask of choice. Supplies 99 month. Please send compliance data [id number]. Diagnosis VIKKI. AHI 17 RDI 18 FLOVENT HFA 44 mcg/actuation inhaler Generic drug:  fluticasone INHALE TWO PUFFS BY MOUTH TWICE DAILY  
  
 lisinopril-hydroCHLOROthiazide 10-12.5 mg per tablet Commonly known as:  PRINZIDE, ZESTORETIC  
TAKE 1 TABELT BY MOUTH TWICE DAILY METANX (ALGAL OIL) 3 mg-35 mg-2 mg -90.314 mg Cap Generic drug:  levomefolate-B6-meB12-algal oil METANX 2.8-2-25 mg Tab Generic drug:  l-methylfolate-vit b12-vit b6 Take 1-2 Tabs by mouth daily as needed. NovoLOG Mix 70-30 100 unit/mL (70-30) injection Generic drug:  insulin aspart protamine/insulin aspart  
by SubCUTAneous route. 54 units SQ in AM and 40 units im PM daily before meals. Prescribed by Shira Torre NP Follow-up Instructions Return in about 6 months (around 2/7/2018) for labs 1 week before.   
  
To-Do List   
 10/13/2017 11:15 AM  
  Appointment with CODY GARCIA  1 at [facility] ([phone]) OUTSIDE FILMS  - Any outside films related to the study being scheduled should be brought with you on the day of the exam.  If this cannot be done there may be a delay in the reading of the study. MEDICATIONS  - Patient must bring a complete list of all medications currently taking to include prescriptions, over-the-counter meds, herbals, vitamins & any dietary supplements  GENERAL INSTRUCTIONS  - On the day of your exam do not use any bath powder, deodorant or lotions on the armpit area. -Tenderness of breasts may cause an increase of discomfort during procedure. If you are experiencing breast tenderness on the day of your appointment and would like to reschedule, please call [phone]. Patient Instructions Advance Directives: Care Instructions Your Care Instructions An advance directive is a legal way to state your wishes at the end of your life. It tells your family and your doctor what to do if you can no longer say what you want. There are two main types of advance directives. You can change them any time that your wishes change. · A living will tells your family and your doctor your wishes about life support and other treatment. · A durable power of  for health care lets you name a person to make treatment decisions for you when you can't speak for yourself. This person is called a health care agent. If you do not have an advance directive, decisions about your medical care may be made by a doctor or a  who doesn't know you. It may help to think of an advance directive as a gift to the people who care for you. If you have one, they won't have to make tough decisions by themselves. Follow-up care is a key part of your treatment and safety. Be sure to make and go to all appointments, and call your doctor if you are having problems. It's also a good idea to know your test results and keep a list of the medicines you take. How can you care for yourself at home? · Discuss your wishes with your loved ones and your doctor. This way, there are no surprises. · Many states have a unique form. Or you might use a universal form that has been approved by many states. This kind of form can sometimes be completed and stored online. Your electronic copy will then be available wherever you have a connection to the Internet. In most cases, doctors will respect your wishes even if you have a form from a different state. · You don't need a  to do an advance directive. But you may want to get legal advice. · Think about these questions when you prepare an advance directive: ¨ Who do you want to make decisions about your medical care if you are not able to? Many people choose a family member or close friend. ¨ Do you know enough about life support methods that might be used? If not, talk to your doctor so you understand. ¨ What are you most afraid of that might happen? You might be afraid of having pain, losing your independence, or being kept alive by machines. ¨ Where would you prefer to die? Choices include your home, a hospital, or a nursing home. ¨ Would you like to have information about hospice care to support you and your family? ¨ Do you want to donate organs when you die? ¨ Do you want certain Sikhism practices performed before you die? If so, put your wishes in the advance directive. · Read your advance directive every year, and make changes as needed. When should you call for help? Be sure to contact your doctor if you have any questions. Where can you learn more? Go to http://niko-shira.info/. Enter R264 in the search box to learn more about \"Advance Directives: Care Instructions. \" Current as of: November 17, 2016 Content Version: 11.3 © 6824-1202 [employer].  Care instructions adapted under license by [employer] (which disclaims liability or warranty for this information). If you have questions about a medical condition or this instruction, always ask your healthcare professional. [address] any warranty or liability for your use of this information. Introducing [facility] SERVICES! Landon Rothman introduces MOBi-LEARN patient portal. Now you can access parts of your medical record, email your doctor's office, and request medication refills online. 1. In your internet browser, go to [email or web address] 2. Click on the First Time User? Click Here link in the Sign In box. You will see the New Member Sign Up page. 3. Enter your MOBi-LEARN Access Code exactly as it appears below. You will not need to use this code after youve completed the sign-up process. If you do not sign up before the expiration date, you must request a new code. · MOBi-LEARN Access Code: [email or web address] Expires: 11/5/2017 10:12 AM 
 
4. Enter the last four digits of your Social Security Number (xxxx) and Date of Birth (mm/dd/yyyy) as indicated and click Submit. You will be taken to the next sign-up page. 5. Create a MOBi-LEARN ID. This will be your MOBi-LEARN login ID and cannot be changed, so think of one that is secure and easy to remember. 6. Create a MOBi-LEARN password. You can change your password at any time. 7. Enter your Password Reset Question and Answer. This can be used at a later time if you forget your password. 8. Enter your e-mail address. You will receive e-mail notification when new information is available in [address]. 9. Click Sign Up. You can now view and download portions of your medical record. 10. Click the Download Summary menu link to download a portable copy of your medical information. If you have questions, please visit the Frequently Asked Questions section of the MOBi-LEARN website. Remember, MOB[employer]ARN is NOT to be used for urgent needs. For medical emergencies, dial 911. Now available from your iPhone and Android! Please provide this summary of care documentation to your next provider. Your primary care clinician is listed as BEA GENTILE. If you have any questions after today's visit, please call [phone].

## 2017-08-07 NOTE — PATIENT INSTRUCTIONS
Advance Directives: Care Instructions  Your Care Instructions  An advance directive is a legal way to state your wishes at the end of your life. It tells your family and your doctor what to do if you can no longer say what you want. There are two main types of advance directives. You can change them any time that your wishes change. · A living will tells your family and your doctor your wishes about life support and other treatment. · A durable power of  for health care lets you name a person to make treatment decisions for you when you can't speak for yourself. This person is called a health care agent. If you do not have an advance directive, decisions about your medical care may be made by a doctor or a  who doesn't know you. It may help to think of an advance directive as a gift to the people who care for you. If you have one, they won't have to make tough decisions by themselves. Follow-up care is a key part of your treatment and safety. Be sure to make and go to all appointments, and call your doctor if you are having problems. It's also a good idea to know your test results and keep a list of the medicines you take. How can you care for yourself at home? · Discuss your wishes with your loved ones and your doctor. This way, there are no surprises. · Many states have a unique form. Or you might use a universal form that has been approved by many states. This kind of form can sometimes be completed and stored online. Your electronic copy will then be available wherever you have a connection to the Internet. In most cases, doctors will respect your wishes even if you have a form from a different state. · You don't need a  to do an advance directive. But you may want to get legal advice. · Think about these questions when you prepare an advance directive:  ¨ Who do you want to make decisions about your medical care if you are not able to?  Many people choose a family member or close friend. ¨ Do you know enough about life support methods that might be used? If not, talk to your doctor so you understand. ¨ What are you most afraid of that might happen? You might be afraid of having pain, losing your independence, or being kept alive by machines. ¨ Where would you prefer to die? Choices include your home, a hospital, or a nursing home. ¨ Would you like to have information about hospice care to support you and your family? ¨ Do you want to donate organs when you die? ¨ Do you want certain Christianity practices performed before you die? If so, put your wishes in the advance directive. · Read your advance directive every year, and make changes as needed. When should you call for help? Be sure to contact your doctor if you have any questions. Where can you learn more? Go to http://niko-shira.info/. Enter R264 in the search box to learn more about \"Advance Directives: Care Instructions. \"  Current as of: November 17, 2016  Content Version: 11.3  © 9435-2430 [employer]. Care instructions adapted under license by [employer] (which disclaims liability or warranty for this information). If you have questions about a medical condition or this instruction, always ask your healthcare professional. [address] any warranty or liability for your use of this information.

## 2017-10-13 ENCOUNTER — HOSPITAL ENCOUNTER (OUTPATIENT)
Dept: MAMMOGRAPHY | Age: 62
Discharge: HOME | End: 2017-10-13
Attending: INTERNAL MEDICINE
Payer: COMMERCIAL

## 2017-10-13 DIAGNOSIS — Z12.31 ENCOUNTER FOR SCREENING MAMMOGRAM FOR BREAST CANCER: ICD-10-CM

## 2017-10-13 PROCEDURE — 77067 SCR MAMMO BI INCL CAD: CPT

## 2018-01-29 ENCOUNTER — HOSPITAL ENCOUNTER (OUTPATIENT)
Dept: LAB | Age: 63
Discharge: HOME | End: 2018-01-29
Payer: COMMERCIAL

## 2018-01-29 DIAGNOSIS — Z79.4 TYPE 2 DIABETES MELLITUS WITHOUT COMPLICATION, WITH LONG-TERM CURRENT USE OF INSULIN (HCC): Chronic | ICD-10-CM

## 2018-01-29 DIAGNOSIS — I10 ESSENTIAL HYPERTENSION: Chronic | ICD-10-CM

## 2018-01-29 DIAGNOSIS — E55.9 VITAMIN D DEFICIENCY: ICD-10-CM

## 2018-01-29 DIAGNOSIS — E78.00 HYPERCHOLESTEROLEMIA: Chronic | ICD-10-CM

## 2018-01-29 DIAGNOSIS — E11.9 TYPE 2 DIABETES MELLITUS WITHOUT COMPLICATION, WITH LONG-TERM CURRENT USE OF INSULIN (HCC): Chronic | ICD-10-CM

## 2018-01-29 LAB
ALBUMIN SERPL-MCNC: 3.8 G/DL (ref 3.4–5)
ALBUMIN/GLOB SERPL: 1 {RATIO} (ref 0.8–1.7)
ALP SERPL-CCNC: 134 U/L (ref 45–117)
ALT SERPL-CCNC: 26 U/L (ref 13–56)
ANION GAP SERPL CALC-SCNC: 9 MMOL/L (ref 3–18)
AST SERPL-CCNC: 18 U/L (ref 15–37)
BILIRUB SERPL-MCNC: 0.4 MG/DL (ref 0.2–1)
BUN SERPL-MCNC: 27 MG/DL (ref 7–18)
BUN/CREAT SERPL: 23 (ref 12–20)
CALCIUM SERPL-MCNC: 9.9 MG/DL (ref 8.5–10.1)
CHLORIDE SERPL-SCNC: 103 MMOL/L (ref 100–108)
CHOLEST SERPL-MCNC: 258 MG/DL
CO2 SERPL-SCNC: 28 MMOL/L (ref 21–32)
CREAT SERPL-MCNC: 1.18 MG/DL (ref 0.6–1.3)
GLOBULIN SER CALC-MCNC: 3.8 G/DL (ref 2–4)
GLUCOSE SERPL-MCNC: 100 MG/DL (ref 74–99)
HBA1C MFR BLD: 9.5 % (ref 4.2–5.6)
HDLC SERPL-MCNC: 79 MG/DL (ref 40–60)
HDLC SERPL: 3.3 {RATIO} (ref 0–5)
LDLC SERPL CALC-MCNC: 155.2 MG/DL (ref 0–100)
LIPID PROFILE,FLP: ABNORMAL
POTASSIUM SERPL-SCNC: 4.7 MMOL/L (ref 3.5–5.5)
PROT SERPL-MCNC: 7.6 G/DL (ref 6.4–8.2)
SODIUM SERPL-SCNC: 140 MMOL/L (ref 136–145)
TRIGL SERPL-MCNC: 119 MG/DL (ref ?–150)
VLDLC SERPL CALC-MCNC: 23.8 MG/DL

## 2018-01-29 PROCEDURE — 83036 HEMOGLOBIN GLYCOSYLATED A1C: CPT | Performed by: INTERNAL MEDICINE

## 2018-01-29 PROCEDURE — 36415 COLL VENOUS BLD VENIPUNCTURE: CPT | Performed by: INTERNAL MEDICINE

## 2018-01-29 PROCEDURE — 80061 LIPID PANEL: CPT | Performed by: INTERNAL MEDICINE

## 2018-01-29 PROCEDURE — 80053 COMPREHEN METABOLIC PANEL: CPT | Performed by: INTERNAL MEDICINE

## 2018-01-29 PROCEDURE — 82570 ASSAY OF URINE CREATININE: CPT | Performed by: INTERNAL MEDICINE

## 2018-01-29 PROCEDURE — 82306 VITAMIN D 25 HYDROXY: CPT | Performed by: INTERNAL MEDICINE

## 2018-01-30 LAB
25(OH)D3 SERPL-MCNC: 38.3 NG/ML (ref 30–100)
CREAT UR-MCNC: 88.67 MG/DL (ref 30–125)
MICROALBUMIN UR-MCNC: 0.8 MG/DL (ref 0–3)
MICROALBUMIN/CREAT UR-RTO: 9 MG/G (ref 0–30)

## 2018-02-05 ENCOUNTER — OFFICE VISIT (OUTPATIENT)
Dept: FAMILY MEDICINE CLINIC | Age: 63
End: 2018-02-05

## 2018-02-05 VITALS
HEIGHT: 65 IN | WEIGHT: 241 LBS | RESPIRATION RATE: 20 BRPM | OXYGEN SATURATION: 98 % | HEART RATE: 87 BPM | DIASTOLIC BLOOD PRESSURE: 82 MMHG | BODY MASS INDEX: 40.15 KG/M2 | SYSTOLIC BLOOD PRESSURE: 145 MMHG | TEMPERATURE: 98.6 F

## 2018-02-05 DIAGNOSIS — Z79.4 TYPE 2 DIABETES MELLITUS WITHOUT COMPLICATION, WITH LONG-TERM CURRENT USE OF INSULIN (HCC): Primary | Chronic | ICD-10-CM

## 2018-02-05 DIAGNOSIS — I10 ESSENTIAL HYPERTENSION: Chronic | ICD-10-CM

## 2018-02-05 DIAGNOSIS — E11.9 TYPE 2 DIABETES MELLITUS WITHOUT COMPLICATION, WITH LONG-TERM CURRENT USE OF INSULIN (HCC): Primary | Chronic | ICD-10-CM

## 2018-02-05 DIAGNOSIS — E78.00 HYPERCHOLESTEROLEMIA: Chronic | ICD-10-CM

## 2018-02-05 DIAGNOSIS — E55.9 VITAMIN D DEFICIENCY: ICD-10-CM

## 2018-02-05 DIAGNOSIS — J45.30 MILD PERSISTENT ASTHMA WITHOUT COMPLICATION: ICD-10-CM

## 2018-02-05 RX ORDER — ROSUVASTATIN CALCIUM 5 MG/1
5 TABLET, COATED ORAL
Qty: 30 TAB | Refills: 5 | Status: SHIPPED | OUTPATIENT
Start: 2018-02-05 | End: 2018-06-13 | Stop reason: SINTOL

## 2018-02-05 RX ORDER — AZELASTINE 1 MG/ML
2 SPRAY, METERED NASAL 2 TIMES DAILY
Qty: 1 BOTTLE | Refills: 5 | Status: SHIPPED | OUTPATIENT
Start: 2018-02-05

## 2018-02-05 RX ORDER — FLUTICASONE PROPIONATE 44 UG/1
AEROSOL, METERED RESPIRATORY (INHALATION)
Qty: 1 INHALER | Refills: 3 | Status: SHIPPED | OUTPATIENT
Start: 2018-02-05 | End: 2018-11-29 | Stop reason: SDUPTHER

## 2018-02-05 NOTE — PROGRESS NOTES
Patient is in the office today for 6 month follow up diabetes. 1. Have you been to the ER, urgent care clinic since your last visit? Hospitalized since your last visit? No    2. Have you seen or consulted any other health care providers outside of the [address] [place] [address] since your last visit? Include any pap smears or colon screening.  Yes Dr. Lex Finney, GYN cancer specialist.

## 2018-02-05 NOTE — PATIENT INSTRUCTIONS

## 2018-02-05 NOTE — PROGRESS NOTES
Subjective:       Chief Complaint  The patient presents for follow up of diabetes, hypertension and high cholesterol. KRIS Sow is a 58 y.o. female seen for follow up of diabetes. Shealso has hypertension and hyperlipidemia. Diabetes poorly controlled, on multiple meds including, novolog 70/30  Insulin 60 units QAM and 50 units QPM, pt is already followed by [facility](Dr Schuster), concerned about pt's compliance which has been an issue in the past, she continues to struggle to try and lose weight which would help bring down her Hba1c from 9.5 which it is currently, [facility] recommends an insulin pump but pt is reluctant to do it, Jardiance did not help her, hypertension well controlled, no significant medication side effects noted, on Zestoretic, hyperlipidemia poorly controlled, on Lipitor 80 mg having leg cramps when she takes it daily. She therefore takes it 3x/week and probably less often according to LDL. Pt may be candidate for crestor 5 mg and Co Q 10      Diet and Lifestyle: not attempting to follow a low fat, low cholesterol diet, does not rigorously follow a diabetic diet, exercises sporadically    Home BP Monitoring: is not measured at home. Diabetic Review of Systems - home glucose monitoring: is performed regularly, 3x/day. Other symptoms and concerns: pt needs to work on losing weight to improve her overall health     Vit D deficiency is well controlled on current supplementation. Pt continues to have knee pain due to arthritis. Asthma is well controlled on Flovent    Discussed the patient's BMI with her. The BMI follow up plan is as follows: BMI is out of normal parameters and plan is as follows: I have counseled this patient on diet and exercise regimens. Current Outpatient Prescriptions   Medication Sig    azelastine (ASTELIN) 137 mcg (0.1 %) nasal spray 2 Sprays by Both Nostrils route two (2) times a day.     fluticasone (FLOVENT HFA) 44 mcg/actuation inhaler INHALE TWO PUFFS BY MOUTH TWICE DAILY    rosuvastatin (CRESTOR) 5 mg tablet Take 1 Tab by mouth nightly.  METANX, ALGAL OIL, 3 mg-35 mg-2 mg -90.314 mg cap     albuterol (PROAIR HFA) 90 mcg/actuation inhaler Take 2 Puffs by inhalation every four (4) hours as needed for Wheezing.  COMBIGAN 0.2-0.5 % drop ophthalmic solution     lisinopril-hydrochlorothiazide (PRINZIDE, ZESTORETIC) 10-12.5 mg per tablet TAKE 1 TABELT BY MOUTH TWICE DAILY    insulin aspart protamine (NOVOLOG MIX 70-30) 100 unit/mL (70-30) injection by SubCUTAneous route. 54 units SQ in AM and 40 units im PM daily before meals. Prescribed by Eusebio Knox NP    l-methylfolate-vit b12-vit b6 (METANX) 2.8-2-25 mg Tab Take 1-2 Tabs by mouth daily as needed.  Calcium Carbonate-Vit D3-Min (CALTRATE 600+D PLUS MINERALS) 600 mg (1,500 mg)-400 unit Chew Take 1 Tab by mouth daily.  aspirin 81 mg Tab Take  by mouth daily.  cpap machine kit by Does Not Apply route. 1  Overnight CPAP at 11 CWP with ramp humidifier. 2  Mask: Silva and Ramya Aristides or mask of choice. Supplies 99 month. Please send compliance data [id number]. Diagnosis VIKKI. AHI 17 RDI 18     No current facility-administered medications for this visit.               Review of Systems  Respiratory: negative for dyspnea on exertion  Cardiovascular: negative for chest pain    Objective:     Visit Vitals    /82 (BP 1 Location: Left arm, BP Patient Position: Sitting)    Pulse 87    Temp 98.6 °F (37 °C) (Oral)    Resp 20    Ht 5' 5\" (1.651 m)    Wt 241 lb (109.3 kg)    SpO2 98%    BMI 40.1 kg/m2        General appearance - alert, well appearing, and in no distress  Chest - clear to auscultation, no wheezes, rales or rhonchi, symmetric air entry  Heart - normal rate, regular rhythm, normal S1, S2, no murmurs, rubs, clicks or gallops  Extremities - peripheral pulses normal, no pedal edema, no clubbing or cyanosis      Labs:   Lab Results   Component Value Date/Time    Hemoglobin A1c 9.5 01/29/2018 08:13 AM    Hemoglobin A1c 9.8 07/31/2017 08:09 AM    Hemoglobin A1c 9.9 01/27/2017 07:58 AM    Hemoglobin A1c, External 10.1 % 05/23/2017    Hemoglobin A1c, External 10.3 11/03/2016    Glucose 100 01/29/2018 08:13 AM    Microalbumin/Creat ratio (mg/g creat) 9 01/29/2018 08:13 AM    Microalbumin,urine random 0.80 01/29/2018 08:13 AM    LDL, calculated 155.2 01/29/2018 08:13 AM    Creatinine, POC 1.5 01/07/2015 09:31 AM    Creatinine 1.18 01/29/2018 08:13 AM      Lab Results   Component Value Date/Time    Cholesterol, total 258 01/29/2018 08:13 AM    HDL Cholesterol 79 01/29/2018 08:13 AM    LDL, calculated 155.2 01/29/2018 08:13 AM    Triglyceride 119 01/29/2018 08:13 AM    CHOL/HDL Ratio 3.3 01/29/2018 08:13 AM       Lab Results   Component Value Date/Time    Sodium 140 01/29/2018 08:13 AM    Potassium 4.7 01/29/2018 08:13 AM    Chloride 103 01/29/2018 08:13 AM    CO2 28 01/29/2018 08:13 AM    Anion gap 9 01/29/2018 08:13 AM    Glucose 100 01/29/2018 08:13 AM    BUN 27 01/29/2018 08:13 AM    Creatinine 1.18 01/29/2018 08:13 AM    BUN/Creatinine ratio 23 01/29/2018 08:13 AM    GFR est AA 56 01/29/2018 08:13 AM    GFR est non-AA 46 01/29/2018 08:13 AM    Calcium 9.9 01/29/2018 08:13 AM    Bilirubin, total 0.4 01/29/2018 08:13 AM    ALT (SGPT) 26 01/29/2018 08:13 AM    AST (SGOT) 18 01/29/2018 08:13 AM    Alk. phosphatase 134 01/29/2018 08:13 AM    Protein, total 7.6 01/29/2018 08:13 AM    Albumin 3.8 01/29/2018 08:13 AM    Globulin 3.8 01/29/2018 08:13 AM    A-G Ratio 1.0 01/29/2018 08:13 AM              Assessment / Plan     Diabetes poorly controlled, on current meds and followed by Endo. Pt will continue to try and lose 10 to 20 lbs, try to be more compliant. Hypertension well controlled, on Zestoretic   Hyperlipidemia poorly controlled, on Lipitor due to myalgias and poor compliance. Pt will try crestor 5 mg and CoQ 10.  She can decrease Crestor to 5 mg every other day if needed ICD-10-CM ICD-9-CM    1. Type 2 diabetes mellitus without complication, with long-term current use of insulin (HCC) E11.9 250.00 HEMOGLOBIN A1C W/O EAG    Z79.4 V58.67    2. Essential hypertension P13 294.9 METABOLIC PANEL, COMPREHENSIVE   3. Hypercholesterolemia E78.00 272.0 LIPID PANEL   4. Vitamin D deficiency E55.9 268.9 Well controlled on current supplementation VITAMIN D, 25 HYDROXY   5. Mild persistent asthma without complication J60.63 382.14 Well controlled on fluticasone (FLOVENT HFA) 44 mcg/actuation inhaler                Diabetic issues reviewed with her: diabetic diet discussed in detail, and low cholesterol diet, weight control and daily exercise discussed. Follow-up Disposition:  Return in about 4 months (around 6/5/2018) for labs 1 week before. Reviewed plan of care. Patient has provided input and agrees with goals. 1

## 2018-02-05 NOTE — MR AVS SNAPSHOT
Yomaira Fuentes 
 
 
 [address], [place] [address] [address] 
[phone] Patient: Alla Holter MRN: [id number] DJM:[id number] Visit Information Date & Time Provider Department Dept. Phone Encounter #  
 2/5/2018 10:00 AM Joleen Leung, [address] [place] [address] [phone] [id number] Follow-up Instructions Return in about 4 months (around 6/5/2018) for labs 1 week before. Upcoming Health Maintenance Date Due DTaP/Tdap/Td series (1 - Tdap) 8/23/1976 ZOSTER VACCINE AGE 60> 6/23/2015 FOOT EXAM Q1 5/18/2016 EYE EXAM RETINAL OR DILATED Q1 6/1/2017 PAP AKA CERVICAL CYTOLOGY 12/9/2017 HEMOGLOBIN A1C Q6M 7/29/2018 MICROALBUMIN Q1 1/29/2019 LIPID PANEL Q1 1/29/2019 BREAST CANCER SCRN MAMMOGRAM 10/13/2019 COLONOSCOPY 10/31/2019 Allergies as of 2/5/2018  Review Complete On: 2/5/2018 By: Joleen Leung MD  
 No Known Allergies Current Immunizations  Reviewed on 10/15/2014 No immunizations on file. Not reviewed this visit You Were Diagnosed With   
  
 Codes Comments Type 2 diabetes mellitus without complication, with long-term current use of insulin (HCC)    -  Primary ICD-10-CM: E11.9, Z79.4 ICD-9-CM: 250.00, V58.67 Essential hypertension     ICD-10-CM: I10 
ICD-9-CM: 401.9 Hypercholesterolemia     ICD-10-CM: E78.00 ICD-9-CM: 272.0 Vitamin D deficiency     ICD-10-CM: E55.9 ICD-9-CM: 268.9 Mild persistent asthma without complication     RIT-43-GQ: J45.30 ICD-9-CM: 493.90 Vitals BP Pulse Temp Resp Height(growth percentile) Weight(growth percentile) 145/82 (BP 1 Location: Left arm, BP Patient Position: Sitting) 87 98.6 °F (37 °C) (Oral) 20 5' 5\" (1.651 m) 241 lb (109.3 kg) SpO2 BMI OB Status Smoking Status 98% 40.1 kg/m2 Postmenopausal Never Smoker BMI and BSA Data Body Mass Index Body Surface Area  
 40.1 kg/m 2 2.24 m 2 [facility]erred [facility] Phone [facility], [address] [place] [phone] Your Updated Medication List  
  
   
This list is accurate as of: 2/5/18 10:26 AM.  Always use your most recent med list.  
  
  
  
  
 albuterol 90 mcg/actuation inhaler Commonly known as:  PROAIR HFA Take 2 Puffs by inhalation every four (4) hours as needed for Wheezing. aspirin 81 mg tablet Take  by mouth daily. azelastine 137 mcg (0.1 %) nasal spray Commonly known as:  ASTELIN  
2 Sprays by Both Nostrils route two (2) times a day. CALTRATE 600+D PLUS MINERALS 600 mg (1,500 mg)-400 unit Chew Generic drug:  Calcium Carbonate-Vit D3-Min Take 1 Tab by mouth daily. COMBIGAN 0.2-0.5 % Drop ophthalmic solution Generic drug:  brimonidine-timolol  
  
 cpap machine kit  
by Does Not Apply route. 1  Overnight CPAP at 11 CWP with ramp humidifier. 2  Mask: Miranda Aristides or mask of choice. Supplies 99 month. Please send compliance data [id number]. Diagnosis VIKKI. AHI 17 RDI 18  
  
 fluticasone 44 mcg/actuation inhaler Commonly known as:  FLOVENT HFA INHALE TWO PUFFS BY MOUTH TWICE DAILY  
  
 lisinopril-hydroCHLOROthiazide 10-12.5 mg per tablet Commonly known as:  PRINZIDE, ZESTORETIC  
TAKE 1 TABELT BY MOUTH TWICE DAILY METANX (ALGAL OIL) 3 mg-35 mg-2 mg -90.314 mg Cap Generic drug:  levomefolate-B6-meB12-algal oil METANX 2.8-2-25 mg Tab Generic drug:  l-methylfolate-vit b12-vit b6 Take 1-2 Tabs by mouth daily as needed. NovoLOG Mix 70-30 100 unit/mL (70-30) injection Generic drug:  insulin aspart protamine/insulin aspart  
by SubCUTAneous route. 54 units SQ in AM and 40 units im PM daily before meals. Prescribed by Javi Valerio NP  
  
 rosuvastatin 5 mg tablet Commonly known as:  CRESTOR Take 1 Tab by mouth nightly. Prescriptions Sent to Pharmacy Refills azelastine (ASTELIN) 137 mcg (0.1 %) nasal spray 5 Si Sprays by Both Nostrils route two (2) times a day. Class: Normal  
 Pharmacy: [address] [place], [place] [address] [place] [address] Ph #: [phone] Route: Both Nostrils  
 fluticasone (FLOVENT HFA) 44 mcg/actuation inhaler 3 Sig: INHALE TWO PUFFS BY MOUTH TWICE DAILY Class: Normal  
 Pharmacy: [address] [place] [address] Ph #: [phone]  
 rosuvastatin (CRESTOR) 5 mg tablet 5 Sig: Take 1 Tab by mouth nightly. Class: Normal  
 Pharmacy: [address] [place] [address] [address] [place] [address] Ph #: [phone] Route: Oral  
  
Follow-up Instructions Return in about 4 months (around 2018) for labs 1 week before. Patient Instructions Learning About Diabetes Food Guidelines Your Care Instructions Meal planning is important to manage diabetes. It helps keep your blood sugar at a target level (which you set with your doctor). You don't have to eat special foods. You can eat what your family eats, including sweets once in a while. But you do have to pay attention to how often you eat and how much you eat of certain foods. You may want to work with a dietitian or a certified diabetes educator (CDE) to help you plan meals and snacks. A dietitian or CDE can also help you lose weight if that is one of your goals. What should you know about eating carbs? Managing the amount of carbohydrate (carbs) you eat is an important part of healthy meals when you have diabetes. Carbohydrate is found in many foods. · Learn which foods have carbs. And learn the amounts of carbs in different foods. ¨ Bread, cereal, pasta, and rice have about 15 grams of carbs in a serving. A serving is 1 slice of bread (1 ounce), ½ cup of cooked cereal, or 1/3 cup of cooked pasta or rice. ¨ Fruits have 15 grams of carbs in a serving.  A serving is 1 small fresh fruit, such as an apple or orange; ½ of a banana; ½ cup of cooked or canned fruit; ½ cup of fruit juice; 1 cup of melon or raspberries; or 2 tablespoons of dried fruit. ¨ Milk and no-sugar-added yogurt have 15 grams of carbs in a serving. A serving is 1 cup of milk or 2/3 cup of no-sugar-added yogurt. ¨ Starchy vegetables have 15 grams of carbs in a serving. A serving is ½ cup of mashed potatoes or sweet potato; 1 cup winter squash; ½ of a small baked potato; ½ cup of cooked beans; or ½ cup cooked corn or green peas. · Learn how much carbs to eat each day and at each meal. A dietitian or CDE can teach you how to keep track of the amount of carbs you eat. This is called carbohydrate counting. · If you are not sure how to count carbohydrate grams, use the Plate Method to plan meals. It is a good, quick way to make sure that you have a balanced meal. It also helps you spread carbs throughout the day. ¨ Divide your plate by types of foods. Put non-starchy vegetables on half the plate, meat or other protein food on one-quarter of the plate, and a grain or starchy vegetable in the final quarter of the plate. To this you can add a small piece of fruit and 1 cup of milk or yogurt, depending on how many carbs you are supposed to eat at a meal. 
· Try to eat about the same amount of carbs at each meal. Do not \"save up\" your daily allowance of carbs to eat at one meal. 
· Proteins have very little or no carbs per serving. Examples of proteins are beef, chicken, turkey, fish, eggs, tofu, cheese, cottage cheese, and peanut butter. A serving size of meat is 3 ounces, which is about the size of a deck of cards. Examples of meat substitute serving sizes (equal to 1 ounce of meat) are 1/4 cup of cottage cheese, 1 egg, 1 tablespoon of peanut butter, and ½ cup of tofu. How can you eat out and still eat healthy? · Learn to estimate the serving sizes of foods that have carbohydrate.  If you measure food at home, it will be easier to estimate the amount in a serving of restaurant food. · If the meal you order has too much carbohydrate (such as potatoes, corn, or baked beans), ask to have a low-carbohydrate food instead. Ask for a salad or green vegetables. · If you use insulin, check your blood sugar before and after eating out to help you plan how much to eat in the future. · If you eat more carbohydrate at a meal than you had planned, take a walk or do other exercise. This will help lower your blood sugar. What else should you know? · Limit saturated fat, such as the fat from meat and dairy products. This is a healthy choice because people who have diabetes are at higher risk of heart disease. So choose lean cuts of meat and nonfat or low-fat dairy products. Use olive or canola oil instead of butter or shortening when cooking. · Don't skip meals. Your blood sugar may drop too low if you skip meals and take insulin or certain medicines for diabetes. · Check with your doctor before you drink alcohol. Alcohol can cause your blood sugar to drop too low. Alcohol can also cause a bad reaction if you take certain diabetes medicines. Follow-up care is a key part of your treatment and safety. Be sure to make and go to all appointments, and call your doctor if you are having problems. It's also a good idea to know your test results and keep a list of the medicines you take. Where can you learn more? Go to http://niko-shira.info/. Enter P768 in the search box to learn more about \"Learning About Diabetes Food Guidelines. \" Current as of: March 13, 2017 Content Version: 11.4 © 7544-4300 [employer]. Care instructions adapted under license by [employer] (which disclaims liability or warranty for this information).  If you have questions about a medical condition or this instruction, always ask your healthcare professional. [employer] disclaims any warranty or liability for your use of this information. Introducing [facility]! Ulices Lockhart introduces LookUP patient portal. Now you can access parts of your medical record, email your doctor's office, and request medication refills online. 1. In your internet browser, go to [email or web address] 2. Click on the First Time User? Click Here link in the Sign In box. You will see the New Member Sign Up page. 3. Enter your LookUP Access Code exactly as it appears below. You will not need to use this code after youve completed the sign-up process. If you do not sign up before the expiration date, you must request a new code. · LookUP Access Code: [email or web address] Expires: 5/6/2018  9:38 AM 
 
4. Enter the last four digits of your Social Security Number (xxxx) and Date of Birth (mm/dd/yyyy) as indicated and click Submit. You will be taken to the next sign-up page. 5. Create a LookUP ID. This will be your LookUP login ID and cannot be changed, so think of one that is secure and easy to remember. 6. Create a LookUP password. You can change your password at any time. 7. Enter your Password Reset Question and Answer. This can be used at a later time if you forget your password. 8. Enter your e-mail address. You will receive e-mail notification when new information is available in [address] 9. Click Sign Up. You can now view and download portions of your medical record. 10. Click the Download Summary menu link to download a portable copy of your medical information. If you have questions, please visit the Frequently Asked Questions section of the LookUP website. Remember, LookUP is NOT to be used for urgent needs. For medical emergencies, dial 911. Now available from your iPhone and Android! Please provide this summary of care documentation to your next provider. Your primary care clinician is listed as BEA GENTILE. If you have any questions after today's visit, please call [phone].

## 2018-06-06 ENCOUNTER — HOSPITAL ENCOUNTER (OUTPATIENT)
Dept: LAB | Age: 63
Discharge: HOME | End: 2018-06-06
Payer: COMMERCIAL

## 2018-06-06 DIAGNOSIS — E11.9 TYPE 2 DIABETES MELLITUS WITHOUT COMPLICATION, WITH LONG-TERM CURRENT USE OF INSULIN (HCC): Chronic | ICD-10-CM

## 2018-06-06 DIAGNOSIS — E78.00 HYPERCHOLESTEROLEMIA: Chronic | ICD-10-CM

## 2018-06-06 DIAGNOSIS — Z79.4 TYPE 2 DIABETES MELLITUS WITHOUT COMPLICATION, WITH LONG-TERM CURRENT USE OF INSULIN (HCC): Chronic | ICD-10-CM

## 2018-06-06 DIAGNOSIS — I10 ESSENTIAL HYPERTENSION: Chronic | ICD-10-CM

## 2018-06-06 DIAGNOSIS — E55.9 VITAMIN D DEFICIENCY: ICD-10-CM

## 2018-06-06 LAB
ALBUMIN SERPL-MCNC: 3.7 G/DL (ref 3.4–5)
ALBUMIN/GLOB SERPL: 1.1 {RATIO} (ref 0.8–1.7)
ALP SERPL-CCNC: 141 U/L (ref 45–117)
ALT SERPL-CCNC: 21 U/L (ref 13–56)
ANION GAP SERPL CALC-SCNC: 8 MMOL/L (ref 3–18)
AST SERPL-CCNC: 15 U/L (ref 15–37)
BILIRUB SERPL-MCNC: 0.3 MG/DL (ref 0.2–1)
BUN SERPL-MCNC: 37 MG/DL (ref 7–18)
BUN/CREAT SERPL: 30 (ref 12–20)
CALCIUM SERPL-MCNC: 9.7 MG/DL (ref 8.5–10.1)
CHLORIDE SERPL-SCNC: 106 MMOL/L (ref 100–108)
CHOLEST SERPL-MCNC: 276 MG/DL
CO2 SERPL-SCNC: 29 MMOL/L (ref 21–32)
CREAT SERPL-MCNC: 1.24 MG/DL (ref 0.6–1.3)
GLOBULIN SER CALC-MCNC: 3.5 G/DL (ref 2–4)
GLUCOSE SERPL-MCNC: 155 MG/DL (ref 74–99)
HBA1C MFR BLD: 9.3 % (ref 4.2–5.6)
HDLC SERPL-MCNC: 78 MG/DL (ref 40–60)
HDLC SERPL: 3.5 {RATIO} (ref 0–5)
LDLC SERPL CALC-MCNC: 172.4 MG/DL (ref 0–100)
LIPID PROFILE,FLP: ABNORMAL
POTASSIUM SERPL-SCNC: 4.8 MMOL/L (ref 3.5–5.5)
PROT SERPL-MCNC: 7.2 G/DL (ref 6.4–8.2)
SODIUM SERPL-SCNC: 143 MMOL/L (ref 136–145)
TRIGL SERPL-MCNC: 128 MG/DL (ref ?–150)
VLDLC SERPL CALC-MCNC: 25.6 MG/DL

## 2018-06-06 PROCEDURE — 82306 VITAMIN D 25 HYDROXY: CPT | Performed by: INTERNAL MEDICINE

## 2018-06-06 PROCEDURE — 80061 LIPID PANEL: CPT | Performed by: INTERNAL MEDICINE

## 2018-06-06 PROCEDURE — 36415 COLL VENOUS BLD VENIPUNCTURE: CPT | Performed by: INTERNAL MEDICINE

## 2018-06-06 PROCEDURE — 80053 COMPREHEN METABOLIC PANEL: CPT | Performed by: INTERNAL MEDICINE

## 2018-06-06 PROCEDURE — 83036 HEMOGLOBIN GLYCOSYLATED A1C: CPT | Performed by: INTERNAL MEDICINE

## 2018-06-07 LAB — 25(OH)D3 SERPL-MCNC: 41.6 NG/ML (ref 30–100)

## 2018-06-13 ENCOUNTER — OFFICE VISIT (OUTPATIENT)
Dept: FAMILY MEDICINE CLINIC | Age: 63
End: 2018-06-13

## 2018-06-13 VITALS
TEMPERATURE: 98.4 F | SYSTOLIC BLOOD PRESSURE: 155 MMHG | OXYGEN SATURATION: 97 % | BODY MASS INDEX: 40.15 KG/M2 | RESPIRATION RATE: 20 BRPM | WEIGHT: 241 LBS | DIASTOLIC BLOOD PRESSURE: 71 MMHG | HEART RATE: 93 BPM | HEIGHT: 65 IN

## 2018-06-13 DIAGNOSIS — E66.01 MORBID OBESITY (HCC): ICD-10-CM

## 2018-06-13 DIAGNOSIS — E11.9 TYPE 2 DIABETES MELLITUS WITHOUT COMPLICATION, WITH LONG-TERM CURRENT USE OF INSULIN (HCC): Primary | Chronic | ICD-10-CM

## 2018-06-13 DIAGNOSIS — I10 ESSENTIAL HYPERTENSION: Chronic | ICD-10-CM

## 2018-06-13 DIAGNOSIS — E78.00 HYPERCHOLESTEROLEMIA: Chronic | ICD-10-CM

## 2018-06-13 DIAGNOSIS — Z79.4 TYPE 2 DIABETES MELLITUS WITHOUT COMPLICATION, WITH LONG-TERM CURRENT USE OF INSULIN (HCC): Primary | Chronic | ICD-10-CM

## 2018-06-13 DIAGNOSIS — E55.9 VITAMIN D DEFICIENCY: ICD-10-CM

## 2018-06-13 NOTE — PROGRESS NOTES
Subjective:       Chief Complaint  The patient presents for follow up of diabetes, hypertension and high cholesterol. KRIS Sol Junior is a 58 y.o. female seen for follow up of diabetes. Shealso has hypertension and hyperlipidemia. Diabetes poorly controlled, on multiple meds including, novolog 70/30  Insulin 60 units QAM and 50 units QPM, pt is already followed by [facility](Dr Schuster), concerned about pt's compliance which has been an issue in the past, she continues to struggle to try and lose weight which would help bring down her Hba1c from 9.3 which it is currently, [facility] recommends an insulin pump but pt is reluctant to do it, Jardiance did not help her, hypertension borderline controlled, no significant medication side effects noted, on Zestoretic, hyperlipidemia poorly controlled, off crestor which she stopped due tp leg cramps. She may benefit from [address] [place] [address] in the future. Diet and Lifestyle: not attempting to follow a low fat, low cholesterol diet, does not rigorously follow a diabetic diet, exercises sporadically    Home BP Monitoring: is not measured at home. Diabetic Review of Systems - home glucose monitoring: is performed regularly, 3x/day. Other symptoms and concerns: pt needs to work on losing weight to improve her overall health her BMI of 40 puts her at increased risk of multiple medical problems    Vit D deficiency is well controlled on current supplementation. Pt continues to have knee pain due to arthritis. Asthma is well controlled on Flovent      Current Outpatient Prescriptions   Medication Sig    insulin NPH/insulin regular (NOVOLIN 70/30, HUMULIN 70/30) 100 unit/mL (70-30) injection 50 units BID SQ ok to use Relion [employer] brand    azelastine (ASTELIN) 137 mcg (0.1 %) nasal spray 2 Sprays by Both Nostrils route two (2) times a day.     fluticasone (FLOVENT HFA) 44 mcg/actuation inhaler INHALE TWO PUFFS BY MOUTH TWICE DAILY    cpap machine kit by Does Not Apply route. 1  Overnight CPAP at 11 CWP with ramp humidifier. 2  Mask: Silva and Pykel Aristides or mask of choice. Supplies 99 month. Please send compliance data [id number]. Diagnosis VIKKI. AHI 17 RDI 18    METANX, ALGAL OIL, 3 mg-35 mg-2 mg -90.314 mg cap     albuterol (PROAIR HFA) 90 mcg/actuation inhaler Take 2 Puffs by inhalation every four (4) hours as needed for Wheezing.  COMBIGAN 0.2-0.5 % drop ophthalmic solution     lisinopril-hydrochlorothiazide (PRINZIDE, ZESTORETIC) 10-12.5 mg per tablet TAKE 1 TABELT BY MOUTH TWICE DAILY    l-methylfolate-vit b12-vit b6 (METANX) 2.8-2-25 mg Tab Take 1-2 Tabs by mouth daily as needed.  Calcium Carbonate-Vit D3-Min (CALTRATE 600+D PLUS MINERALS) 600 mg (1,500 mg)-400 unit Chew Take 1 Tab by mouth daily.  aspirin 81 mg Tab Take  by mouth daily. No current facility-administered medications for this visit.               Review of Systems  Respiratory: negative for dyspnea on exertion  Cardiovascular: negative for chest pain    Objective:     Visit Vitals    /71 (BP 1 Location: Left arm, BP Patient Position: Sitting)    Pulse 93    Temp 98.4 °F (36.9 °C) (Oral)    Resp 20    Ht 5' 5\" (1.651 m)    Wt 241 lb (109.3 kg)    SpO2 97%    BMI 40.1 kg/m2        General appearance - alert, well appearing, and in no distress  Chest - clear to auscultation, no wheezes, rales or rhonchi, symmetric air entry  Heart - normal rate, regular rhythm, normal S1, S2, no murmurs, rubs, clicks or gallops  Extremities - peripheral pulses normal, no pedal edema, no clubbing or cyanosis      Labs:   Lab Results   Component Value Date/Time    Hemoglobin A1c 9.3 (H) 06/06/2018 08:34 AM    Hemoglobin A1c 9.5 (H) 01/29/2018 08:13 AM    Hemoglobin A1c 9.8 (H) 07/31/2017 08:09 AM    Hemoglobin A1c, External 10.1 % 05/23/2017    Hemoglobin A1c, External 10.3 11/03/2016    Glucose 155 (H) 06/06/2018 08:34 AM    Microalbumin/Creat ratio (mg/g creat) 9 01/29/2018 08:13 AM Microalbumin,urine random 0.80 01/29/2018 08:13 AM    LDL, calculated 172.4 (H) 06/06/2018 08:34 AM    Creatinine, POC 1.5 (H) 01/07/2015 09:31 AM    Creatinine 1.24 06/06/2018 08:34 AM      Lab Results   Component Value Date/Time    Cholesterol, total 276 (H) 06/06/2018 08:34 AM    HDL Cholesterol 78 (H) 06/06/2018 08:34 AM    LDL, calculated 172.4 (H) 06/06/2018 08:34 AM    Triglyceride 128 06/06/2018 08:34 AM    CHOL/HDL Ratio 3.5 06/06/2018 08:34 AM       Lab Results   Component Value Date/Time    Sodium 143 06/06/2018 08:34 AM    Potassium 4.8 06/06/2018 08:34 AM    Chloride 106 06/06/2018 08:34 AM    CO2 29 06/06/2018 08:34 AM    Anion gap 8 06/06/2018 08:34 AM    Glucose 155 (H) 06/06/2018 08:34 AM    BUN 37 (H) 06/06/2018 08:34 AM    Creatinine 1.24 06/06/2018 08:34 AM    BUN/Creatinine ratio 30 (H) 06/06/2018 08:34 AM    GFR est AA 53 (L) 06/06/2018 08:34 AM    GFR est non-AA 44 (L) 06/06/2018 08:34 AM    Calcium 9.7 06/06/2018 08:34 AM    Bilirubin, total 0.3 06/06/2018 08:34 AM    ALT (SGPT) 21 06/06/2018 08:34 AM    AST (SGOT) 15 06/06/2018 08:34 AM    Alk. phosphatase 141 (H) 06/06/2018 08:34 AM    Protein, total 7.2 06/06/2018 08:34 AM    Albumin 3.7 06/06/2018 08:34 AM    Globulin 3.5 06/06/2018 08:34 AM    A-G Ratio 1.1 06/06/2018 08:34 AM              Assessment / Plan     Diabetes poorly controlled, on current meds and followed by Endo. Pt will continue to try and lose 10 to 20 lbs, try to be more compliant. I agree with and that she probably would do better with insulin pump due to insulin resistance. Hypertension borderline controlled, on Zestoretic. Noncompliance  Hyperlipidemia patient unable to tolerate statins due to myalgias      ICD-10-CM ICD-9-CM    1. Type 2 diabetes mellitus without complication, with long-term current use of insulin (HCC) E11.9 250.00 HEMOGLOBIN A1C W/O EAG    Z79.4 V58.67    2. Hypercholesterolemia E78.00 272.0 LIPID PANEL   3.  Essential hypertension I10 401.9 METABOLIC PANEL, COMPREHENSIVE   4. Vitamin D deficiency E55.9 268.9 Well controlled on current supplementation VITAMIN D, 25 HYDROXY                Diabetic issues reviewed with her: diabetic diet discussed in detail, and low cholesterol diet, weight control and daily exercise discussed. Follow-up Disposition:  Return in about 4 months (around 10/13/2018) for labs 1 week before. Reviewed plan of care. Patient has provided input and agrees with goals.

## 2018-06-13 NOTE — PROGRESS NOTES
Patient is in the office today for 4 month follow up. 1. Have you been to the ER, urgent care clinic since your last visit? Hospitalized since your last visit? No    2. Have you seen or consulted any other health care providers outside of the [address] [place] [address] since your last visit? Include any pap smears or colon screening.  No

## 2018-06-13 NOTE — MR AVS SNAPSHOT
[facility] 
 
 
 [address] [facility] [address] [address] 
[phone] Patient: Christianne Donohue MRN: [id number] [facility]:[birth year] Visit Information Date & Time Provider Department Dept. Phone Encounter #  
 6/13/2018  9:00 AM Breezy Mason, [address] [place] [address] [phone] [id number] Follow-up Instructions Return in about 4 months (around 10/13/2018) for labs 1 week before. Upcoming Health Maintenance Date Due DTaP/Tdap/Td series (1 - Tdap) 8/23/1976 ZOSTER VACCINE AGE 60> 6/23/2015 PAP AKA CERVICAL CYTOLOGY 12/9/2017 HEMOGLOBIN A1C Q6M 12/6/2018 FOOT EXAM Q1 12/28/2018 MICROALBUMIN Q1 1/29/2019 EYE EXAM RETINAL OR DILATED Q1 2/28/2019 LIPID PANEL Q1 6/6/2019 BREAST CANCER SCRN MAMMOGRAM 10/13/2019 COLONOSCOPY 10/31/2019 Allergies as of 6/13/2018  Review Complete On: 6/13/2018 By: Sonya Mcmahon LPN No Known Allergies Current Immunizations  Reviewed on 10/15/2014 No immunizations on file. Not reviewed this visit You Were Diagnosed With   
  
 Codes Comments Type 2 diabetes mellitus without complication, with long-term current use of insulin (HCC)    -  Primary ICD-10-CM: E11.9, Z79.4 ICD-9-CM: 250.00, V58.67 Hypercholesterolemia     ICD-10-CM: E78.00 ICD-9-CM: 272.0 Essential hypertension     ICD-10-CM: I10 
ICD-9-CM: 401.9 Vitals BP Pulse Temp Resp Height(growth percentile) Weight(growth percentile) 155/71 (BP 1 Location: Left arm, BP Patient Position: Sitting) 93 98.4 °F (36.9 °C) (Oral) 20 5' 5\" (1.651 m) 241 lb (109.3 kg) SpO2 BMI OB Status Smoking Status 97% 40.1 kg/m2 Postmenopausal Never Smoker BMI and BSA Data Body Mass Index Body Surface Area  
 40.1 kg/m 2 2.24 m 2 Preferred Pharmacy Pharmacy Name Phone [address] [place] [address], [address] [facility] [phone] Your Updated Medication List  
  
   
This list is accurate as of 18  9:57 AM.  Always use your most recent med list.  
  
  
  
  
 albuterol 90 mcg/actuation inhaler Commonly known as:  PROAIR HFA Take 2 Puffs by inhalation every four (4) hours as needed for Wheezing. aspirin 81 mg tablet Take  by mouth daily. azelastine 137 mcg (0.1 %) nasal spray Commonly known as:  ASTELIN  
2 Sprays by Both Nostrils route two (2) times a day. CALTRATE 600+D PLUS MINERALS 600 mg (1,500 mg)-400 unit Chew Generic drug:  Calcium Carbonate-Vit D3-Min Take 1 Tab by mouth daily. COMBIGAN 0.2-0.5 % Drop ophthalmic solution Generic drug:  brimonidine-timolol  
  
 cpap machine kit  
by Does Not Apply route. 1  Overnight CPAP at 11 CWP with ramp humidifier. 2  Mask: Ricardo and Ramya Aristides or mask of choice. Supplies 99 month. Please send compliance data [id number]. Diagnosis VIKKI. AHI 17 RDI 18  
  
 fluticasone 44 mcg/actuation inhaler Commonly known as:  FLOVENT HFA INHALE TWO PUFFS BY MOUTH TWICE DAILY  
  
 insulin NPH/insulin regular 100 unit/mL (70-30) injection Commonly known as:  NOVOLIN 70/30, HUMULIN 70/30  
50 units BID SQ ok to use Relion [employer] brand  
  
 lisinopril-hydroCHLOROthiazide 10-12.5 mg per tablet Commonly known as:  PRINZIDE, ZESTORETIC  
TAKE 1 TABELT BY MOUTH TWICE DAILY METANX (ALGAL OIL) 3 mg-35 mg-2 mg -90.314 mg Cap Generic drug:  levomefolate-B6-meB12-algal oil METANX 2.8-2-25 mg Tab Generic drug:  l-methylfolate-vit b12-vit b6 Take 1-2 Tabs by mouth daily as needed. Prescriptions Printed Refills  
 insulin NPH/insulin regular (NOVOLIN 70/30, HUMULIN 70/30) 100 unit/mL (70-30) injection 5 Si units BID SQ ok to use Relion [employer] brand Class: Print Follow-up Instructions Return in about 4 months (around 10/13/2018) for labs 1 week before. Patient Instructions High Blood Pressure: Care Instructions Your Care Instructions If your blood pressure is usually above 140/90, you have high blood pressure, or hypertension. That means the top number is 140 or higher or the bottom number is 90 or higher, or both. Despite what a lot of people think, high blood pressure usually doesn't cause headaches or make you feel dizzy or lightheaded. It usually has no symptoms. But it does increase your risk for heart attack, stroke, and kidney or eye damage. The higher your blood pressure, the more your risk increases. Your doctor will give you a goal for your blood pressure. Your goal will be based on your health and your age. An example of a goal is to keep your blood pressure below 140/90. Lifestyle changes, such as eating healthy and being active, are always important to help lower blood pressure. You might also take medicine to reach your blood pressure goal. 
Follow-up care is a key part of your treatment and safety. Be sure to make and go to all appointments, and call your doctor if you are having problems. It's also a good idea to know your test results and keep a list of the medicines you take. How can you care for yourself at home? Medical treatment · If you stop taking your medicine, your blood pressure will go back up. You may take one or more types of medicine to lower your blood pressure. Be safe with medicines. Take your medicine exactly as prescribed. Call your doctor if you think you are having a problem with your medicine. · Talk to your doctor before you start taking aspirin every day. Aspirin can help certain people lower their risk of a heart attack or stroke. But taking aspirin isn't right for everyone, because it can cause serious bleeding. · See your doctor regularly. You may need to see the doctor more often at first or until your blood pressure comes down.  
· If you are taking blood pressure medicine, talk to your doctor before you take decongestants or anti-inflammatory medicine, such as ibuprofen. Some of these medicines can raise blood pressure. · Learn how to check your blood pressure at home. Lifestyle changes · Stay at a healthy weight. This is especially important if you put on weight around the waist. Losing even 10 pounds can help you lower your blood pressure. · If your doctor recommends it, get more exercise. Walking is a good choice. Bit by bit, increase the amount you walk every day. Try for at least 30 minutes on most days of the week. You also may want to swim, bike, or do other activities. · Avoid or limit alcohol. Talk to your doctor about whether you can drink any alcohol. · Try to limit how much sodium you eat to less than 2,300 milligrams (mg) a day. Your doctor may ask you to try to eat less than 1,500 mg a day. · Eat plenty of fruits (such as bananas and oranges), vegetables, legumes, whole grains, and low-fat dairy products. · Lower the amount of saturated fat in your diet. Saturated fat is found in animal products such as milk, cheese, and meat. Limiting these foods may help you lose weight and also lower your risk for heart disease. · Do not smoke. Smoking increases your risk for heart attack and stroke. If you need help quitting, talk to your doctor about stop-smoking programs and medicines. These can increase your chances of quitting for good. When should you call for help? Call 911 anytime you think you may need emergency care. This may mean having symptoms that suggest that your blood pressure is causing a serious heart or blood vessel problem. Your blood pressure may be over 180/110. ? For example, call 911 if: 
? · You have symptoms of a heart attack. These may include: ¨ Chest pain or pressure, or a strange feeling in the chest. 
¨ Sweating. ¨ Shortness of breath. ¨ Nausea or vomiting.  
¨ Pain, pressure, or a strange feeling in the back, neck, jaw, or upper belly or in one or both shoulders or arms. ¨ Lightheadedness or sudden weakness. ¨ A fast or irregular heartbeat. ? · You have symptoms of a stroke. These may include: 
¨ Sudden numbness, tingling, weakness, or loss of movement in your face, arm, or leg, especially on only one side of your body. ¨ Sudden vision changes. ¨ Sudden trouble speaking. ¨ Sudden confusion or trouble understanding simple statements. ¨ Sudden problems with walking or balance. ¨ A sudden, severe headache that is different from past headaches. ? · You have severe back or belly pain. ?Do not wait until your blood pressure comes down on its own. Get help right away. ?Call your doctor now or seek immediate care if: 
? · Your blood pressure is much higher than normal (such as 180/110 or higher), but you don't have symptoms. ? · You think high blood pressure is causing symptoms, such as: ¨ Severe headache. ¨ Blurry vision. ? Watch closely for changes in your health, and be sure to contact your doctor if: 
? · Your blood pressure measures 140/90 or higher at least 2 times. That means the top number is 140 or higher or the bottom number is 90 or higher, or both. ? · You think you may be having side effects from your blood pressure medicine. ? · Your blood pressure is usually normal, but it goes above normal at least 2 times. Where can you learn more? Go to http://niko-shira.info/. Enter Q491 in the search box to learn more about \"High Blood Pressure: Care Instructions. \" Current as of: September 21, 2016 Content Version: 11.4 © 2809-5711 [employer]. Care instructions adapted under license by [employer] (which disclaims liability or warranty for this information). If you have questions about a medical condition or this instruction, always ask your healthcare professional. [address] any warranty or liability for your use of this information. Introducing [facility] HE[facility]! Valery Bearden introduces [employer] ([employer]) patient portal. Now you can access parts of your medical record, email your doctor's office, and request medication refills online. 1. In your internet browser, go to [email or web address] Gap 2. Click on the First Time User? Click Here link in the Sign In box. You will see the New Member Sign Up page. 3. Enter your [employer] ([employer]) Access Code exactly as it appears below. You will not need to use this code after youve completed the sign-up process. If you do not sign up before the expiration date, you must request a new code. · [employer] ([employer]) Access Code: [email or web address] Expires: 9/11/2018  9:56 AM 
 
4. Enter the last four digits of your Social Security Number (xxxx) and Date of Birth (mm/dd/yyyy) as indicated and click Submit. You will be taken to the next sign-up page. 5. Create a [employer] ([employer]) ID. This will be your [employer] ([employer]) login ID and cannot be changed, so think of one that is secure and easy to remember. 6. Create a [employer]) password. You can change your password at any time. 7. Enter your Password Reset Question and Answer. This can be used at a later time if you forget your password. 8. Enter your e-mail address. You will receive e-mail notification when new information is available in [address] 9. Click Sign Up. You can now view and download portions of your medical record. 10. Click the Download Summary menu link to download a portable copy of your medical information. If you have questions, please visit the Frequently Asked Questions section of the [employer] ([employer]) website. Remember, [employer] ([employer]) is NOT to be used for urgent needs. For medical emergencies, dial 911. Now available from your iPhone and Android! Please provide this summary of care documentation to your next provider. Your primary care clinician is listed as BEA GENTILE. If you have any questions after today's visit, please call [phone].

## 2018-06-13 NOTE — PATIENT INSTRUCTIONS
High Blood Pressure: Care Instructions  Your Care Instructions    If your blood pressure is usually above 140/90, you have high blood pressure, or hypertension. That means the top number is 140 or higher or the bottom number is 90 or higher, or both. Despite what a lot of people think, high blood pressure usually doesn't cause headaches or make you feel dizzy or lightheaded. It usually has no symptoms. But it does increase your risk for heart attack, stroke, and kidney or eye damage. The higher your blood pressure, the more your risk increases. Your doctor will give you a goal for your blood pressure. Your goal will be based on your health and your age. An example of a goal is to keep your blood pressure below 140/90. Lifestyle changes, such as eating healthy and being active, are always important to help lower blood pressure. You might also take medicine to reach your blood pressure goal.  Follow-up care is a key part of your treatment and safety. Be sure to make and go to all appointments, and call your doctor if you are having problems. It's also a good idea to know your test results and keep a list of the medicines you take. How can you care for yourself at home? Medical treatment  · If you stop taking your medicine, your blood pressure will go back up. You may take one or more types of medicine to lower your blood pressure. Be safe with medicines. Take your medicine exactly as prescribed. Call your doctor if you think you are having a problem with your medicine. · Talk to your doctor before you start taking aspirin every day. Aspirin can help certain people lower their risk of a heart attack or stroke. But taking aspirin isn't right for everyone, because it can cause serious bleeding. · See your doctor regularly. You may need to see the doctor more often at first or until your blood pressure comes down.   · If you are taking blood pressure medicine, talk to your doctor before you take decongestants or anti-inflammatory medicine, such as ibuprofen. Some of these medicines can raise blood pressure. · Learn how to check your blood pressure at home. Lifestyle changes  · Stay at a healthy weight. This is especially important if you put on weight around the waist. Losing even 10 pounds can help you lower your blood pressure. · If your doctor recommends it, get more exercise. Walking is a good choice. Bit by bit, increase the amount you walk every day. Try for at least 30 minutes on most days of the week. You also may want to swim, bike, or do other activities. · Avoid or limit alcohol. Talk to your doctor about whether you can drink any alcohol. · Try to limit how much sodium you eat to less than 2,300 milligrams (mg) a day. Your doctor may ask you to try to eat less than 1,500 mg a day. · Eat plenty of fruits (such as bananas and oranges), vegetables, legumes, whole grains, and low-fat dairy products. · Lower the amount of saturated fat in your diet. Saturated fat is found in animal products such as milk, cheese, and meat. Limiting these foods may help you lose weight and also lower your risk for heart disease. · Do not smoke. Smoking increases your risk for heart attack and stroke. If you need help quitting, talk to your doctor about stop-smoking programs and medicines. These can increase your chances of quitting for good. When should you call for help? Call 911 anytime you think you may need emergency care. This may mean having symptoms that suggest that your blood pressure is causing a serious heart or blood vessel problem. Your blood pressure may be over 180/110. ? For example, call 911 if:  ? · You have symptoms of a heart attack. These may include:  ¨ Chest pain or pressure, or a strange feeling in the chest.  ¨ Sweating. ¨ Shortness of breath. ¨ Nausea or vomiting.   ¨ Pain, pressure, or a strange feeling in the back, neck, jaw, or upper belly or in one or both shoulders or arms.  ¨ Lightheadedness or sudden weakness. ¨ A fast or irregular heartbeat. ? · You have symptoms of a stroke. These may include:  ¨ Sudden numbness, tingling, weakness, or loss of movement in your face, arm, or leg, especially on only one side of your body. ¨ Sudden vision changes. ¨ Sudden trouble speaking. ¨ Sudden confusion or trouble understanding simple statements. ¨ Sudden problems with walking or balance. ¨ A sudden, severe headache that is different from past headaches. ? · You have severe back or belly pain. ?Do not wait until your blood pressure comes down on its own. Get help right away. ?Call your doctor now or seek immediate care if:  ? · Your blood pressure is much higher than normal (such as 180/110 or higher), but you don't have symptoms. ? · You think high blood pressure is causing symptoms, such as:  ¨ Severe headache. ¨ Blurry vision. ? Watch closely for changes in your health, and be sure to contact your doctor if:  ? · Your blood pressure measures 140/90 or higher at least 2 times. That means the top number is 140 or higher or the bottom number is 90 or higher, or both. ? · You think you may be having side effects from your blood pressure medicine. ? · Your blood pressure is usually normal, but it goes above normal at least 2 times. Where can you learn more? Go to http://niko-shira.info/. Enter N835 in the search box to learn more about \"High Blood Pressure: Care Instructions. \"  Current as of: September 21, 2016  Content Version: 11.4  © 9127-5512 [employer]. Care instructions adapted under license by [employer] (which disclaims liability or warranty for this information). If you have questions about a medical condition or this instruction, always ask your healthcare professional. [place] [address] any warranty or liability for your use of this information.

## 2018-06-24 PROBLEM — E66.01 MORBID OBESITY (HCC): Status: ACTIVE | Noted: 2018-06-24

## 2018-10-15 ENCOUNTER — HOSPITAL ENCOUNTER (OUTPATIENT)
Dept: MAMMOGRAPHY | Age: 63
Discharge: HOME | End: 2018-10-15
Attending: INTERNAL MEDICINE
Payer: COMMERCIAL

## 2018-10-15 DIAGNOSIS — Z12.31 VISIT FOR SCREENING MAMMOGRAM: ICD-10-CM

## 2018-10-15 PROCEDURE — 77067 SCR MAMMO BI INCL CAD: CPT

## 2018-11-29 DIAGNOSIS — J45.30 MILD PERSISTENT ASTHMA WITHOUT COMPLICATION: ICD-10-CM

## 2018-11-29 DIAGNOSIS — J98.4 RESTRICTIVE AIRWAY DISEASE: ICD-10-CM

## 2018-11-29 RX ORDER — FLUTICASONE PROPIONATE 44 UG/1
AEROSOL, METERED RESPIRATORY (INHALATION)
Qty: 3 INHALER | Refills: 3 | Status: SHIPPED | OUTPATIENT
Start: 2018-11-29 | End: 2019-05-01 | Stop reason: SDUPTHER

## 2018-11-29 RX ORDER — ALBUTEROL SULFATE 90 UG/1
2 AEROSOL, METERED RESPIRATORY (INHALATION)
Qty: 3 INHALER | Refills: 3 | Status: SHIPPED | OUTPATIENT
Start: 2018-11-29

## 2019-01-08 LAB — HBA1C MFR BLD HPLC: 8.8 %

## 2019-03-28 ENCOUNTER — TELEPHONE (OUTPATIENT)
Dept: FAMILY MEDICINE CLINIC | Age: 64
End: 2019-03-28

## 2019-03-28 NOTE — TELEPHONE ENCOUNTER
Placed call to patient to schedule a diabetes follow up with labs (order already placed). If patient returns call to the office please schedule. Patient may come in for lab 1 week prior to scheduled appointment.

## 2019-03-29 NOTE — PROGRESS NOTES
Fax received from endocrinology and external result entered for a1c resulted on 1/8/19.   Foot exam also performed 1/8/19

## 2019-05-01 ENCOUNTER — OFFICE VISIT (OUTPATIENT)
Dept: FAMILY MEDICINE CLINIC | Age: 64
End: 2019-05-01

## 2019-05-01 VITALS
HEART RATE: 78 BPM | OXYGEN SATURATION: 95 % | HEIGHT: 65 IN | DIASTOLIC BLOOD PRESSURE: 81 MMHG | BODY MASS INDEX: 41.62 KG/M2 | RESPIRATION RATE: 20 BRPM | TEMPERATURE: 98 F | SYSTOLIC BLOOD PRESSURE: 142 MMHG | WEIGHT: 249.8 LBS

## 2019-05-01 DIAGNOSIS — I10 ESSENTIAL HYPERTENSION: ICD-10-CM

## 2019-05-01 DIAGNOSIS — Z79.4 TYPE 2 DIABETES MELLITUS WITHOUT COMPLICATION, WITH LONG-TERM CURRENT USE OF INSULIN (HCC): Primary | ICD-10-CM

## 2019-05-01 DIAGNOSIS — E55.9 VITAMIN D DEFICIENCY: ICD-10-CM

## 2019-05-01 DIAGNOSIS — E78.00 HYPERCHOLESTEROLEMIA: ICD-10-CM

## 2019-05-01 DIAGNOSIS — E66.01 MORBID OBESITY (HCC): ICD-10-CM

## 2019-05-01 DIAGNOSIS — J45.30 MILD PERSISTENT ASTHMA WITHOUT COMPLICATION: ICD-10-CM

## 2019-05-01 DIAGNOSIS — E11.9 TYPE 2 DIABETES MELLITUS WITHOUT COMPLICATION, WITH LONG-TERM CURRENT USE OF INSULIN (HCC): Primary | ICD-10-CM

## 2019-05-01 PROBLEM — E11.21 TYPE 2 DIABETES WITH NEPHROPATHY (HCC): Status: ACTIVE | Noted: 2019-05-01

## 2019-05-01 RX ORDER — FLUTICASONE PROPIONATE 44 UG/1
AEROSOL, METERED RESPIRATORY (INHALATION)
Qty: 3 INHALER | Refills: 3 | Status: SHIPPED | OUTPATIENT
Start: 2019-05-01 | End: 2020-03-19

## 2019-05-01 NOTE — PROGRESS NOTES
Patient is in the office today for a follow up. 1. Have you been to the ER, urgent care clinic since your last visit? Hospitalized since your last visit? No 
 
2. Have you seen or consulted any other health care providers outside of the [address] [place] [address] since your last visit?   Include any pap smears or colon screening. yes, Destiney Marquez NP

## 2019-05-01 NOTE — PROGRESS NOTES
Subjective: Chief Complaint The patient presents for follow up of diabetes, hypertension and high cholesterol. KRIS Grant is a 61 y.o. female seen for follow up of diabetes. Shealso has hypertension and hyperlipidemia. Diabetes poorly controlled, on multiple meds including, novolog 70/30  Insulin 60 units QAM and 50 units QPM, pt is already followed by [facility](Dr Schuster), concerned about pt's compliance which has been an issue in the past, she continues to struggle to try and lose weight which would help bring down her Hba1c , [facility] recommends an insulin pump but pt is reluctant to do it, Jardiance did not help her, hypertension borderline controlled, no significant medication side effects noted, on Zestoretic, hyperlipidemia poorly controlled, off crestor which she stopped due tp leg cramps. She may benefit from [address] [place] [address] in the future. Pt has sleep apnea but does not use CPAP machine. Diet and Lifestyle: not attempting to follow a low fat, low cholesterol diet, does not rigorously follow a diabetic diet, exercises sporadically Home BP Monitoring: is not measured at home. Diabetic Review of Systems - home glucose monitoring: is performed regularly, 3x/day. Other symptoms and concerns: pt needs to work on losing weight to improve her overall health her BMI of 41 puts her at increased risk of multiple medical problems Vit D deficiency is well controlled on current supplementation. Pt continues to have knee pain due to arthritis. Asthma is well controlled on Flovent Current Outpatient Medications Medication Sig  
 fluticasone propionate (FLOVENT HFA) 44 mcg/actuation inhaler INHALE TWO PUFFS BY MOUTH TWICE DAILY  insulin NPH/insulin regular (NOVOLIN 70/30, HUMULIN 70/30) 100 unit/mL (70-30) injection 50 units BID SQ ok to use Relion [employer] brand  albuterol (PROAIR HFA) 90 mcg/actuation inhaler Take 2 Puffs by inhalation every four (4) hours as needed for Wheezing.  azelastine (ASTELIN) 137 mcg (0.1 %) nasal spray 2 Sprays by Both Nostrils route two (2) times a day.  cpap machine kit by Does Not Apply route. 1  Overnight CPAP at 11 CWP with ramp humidifier. 2  Mask: Silva and Pykel Aristides or mask of choice. Supplies 99 month. Please send compliance data [id number]. Diagnosis VIKKI. AHI 17 RDI 18  
 METANX, ALGAL OIL, 3 mg-35 mg-2 mg -90.314 mg cap  COMBIGAN 0.2-0.5 % drop ophthalmic solution  lisinopril-hydrochlorothiazide (PRINZIDE, ZESTORETIC) 10-12.5 mg per tablet TAKE 1 TABELT BY MOUTH TWICE DAILY  Calcium Carbonate-Vit D3-Min (CALTRATE 600+D PLUS MINERALS) 600 mg (1,500 mg)-400 unit Chew Take 1 Tab by mouth daily.  aspirin 81 mg Tab Take  by mouth daily. No current facility-administered medications for this visit. Review of Systems Respiratory: negative for dyspnea on exertion Cardiovascular: negative for chest pain Objective:  
 
Visit Vitals /81 (BP 1 Location: Left arm, BP Patient Position: Sitting) Pulse 78 Temp 98 °F (36.7 °C) (Oral) Resp 20 Ht 5' 5\" (1.651 m) Wt 249 lb 12.8 oz (113.3 kg) SpO2 95% BMI 41.57 kg/m² General appearance - alert, well appearing, and in no distress Chest - clear to auscultation, no wheezes, rales or rhonchi, symmetric air entry Heart - normal rate, regular rhythm, normal S1, S2, no murmurs, rubs, clicks or gallops Extremities - peripheral pulses normal, no pedal edema, no clubbing or cyanosis Labs:  
Lab Results Component Value Date/Time Hemoglobin A1c 9.3 (H) 06/06/2018 08:34 AM  
 Hemoglobin A1c 9.5 (H) 01/29/2018 08:13 AM  
 Hemoglobin A1c 9.8 (H) 07/31/2017 08:09 AM  
 Hemoglobin A1c, External 8.8 01/08/2019 Hemoglobin A1c, External 10.1 % 05/23/2017 Hemoglobin A1c, External 10.3 11/03/2016  Glucose 155 (H) 06/06/2018 08:34 AM  
 Microalbumin/Creat ratio (mg/g creat) 9 01/29/2018 08:13 AM  
 Microalbumin,urine random 0.80 01/29/2018 08:13 AM  
 LDL, calculated 172.4 (H) 06/06/2018 08:34 AM  
 Creatinine, POC 1.5 (H) 01/07/2015 09:31 AM  
 Creatinine 1.24 06/06/2018 08:34 AM  
  
Lab Results Component Value Date/Time Cholesterol, total 276 (H) 06/06/2018 08:34 AM  
 HDL Cholesterol 78 (H) 06/06/2018 08:34 AM  
 LDL, calculated 172.4 (H) 06/06/2018 08:34 AM  
 Triglyceride 128 06/06/2018 08:34 AM  
 CHOL/HDL Ratio 3.5 06/06/2018 08:34 AM  
 
 
Lab Results Component Value Date/Time Sodium 143 06/06/2018 08:34 AM  
 Potassium 4.8 06/06/2018 08:34 AM  
 Chloride 106 06/06/2018 08:34 AM  
 CO2 29 06/06/2018 08:34 AM  
 Anion gap 8 06/06/2018 08:34 AM  
 Glucose 155 (H) 06/06/2018 08:34 AM  
 BUN 37 (H) 06/06/2018 08:34 AM  
 Creatinine 1.24 06/06/2018 08:34 AM  
 BUN/Creatinine ratio 30 (H) 06/06/2018 08:34 AM  
 GFR est AA 53 (L) 06/06/2018 08:34 AM  
 GFR est non-AA 44 (L) 06/06/2018 08:34 AM  
 Calcium 9.7 06/06/2018 08:34 AM  
 Bilirubin, total 0.3 06/06/2018 08:34 AM  
 ALT (SGPT) 21 06/06/2018 08:34 AM  
 AST (SGOT) 15 06/06/2018 08:34 AM  
 Alk. phosphatase 141 (H) 06/06/2018 08:34 AM  
 Protein, total 7.2 06/06/2018 08:34 AM  
 Albumin 3.7 06/06/2018 08:34 AM  
 Globulin 3.5 06/06/2018 08:34 AM  
 A-G Ratio 1.1 06/06/2018 08:34 AM  
  
 
 
 
 
Assessment / Plan Diabetes poorly controlled, on current meds and followed by Endo. Pt will continue to try and lose 10 to 20 lbs, try to be more compliant. I agree with and that she probably would do better with insulin pump due to insulin resistance. Hypertension borderline controlled, on Zestoretic. Noncompliance Hyperlipidemia patient unable to tolerate statins due to myalgias ICD-10-CM ICD-9-CM 1.  Type 2 diabetes mellitus without complication, with long-term current use of insulin ([facility]) E11.9 250.00 MICROALBUMIN, UR, RAND W/ MICROALB/CREAT RATIO  
 Z79.4 V58.67 HEMOGLOBIN A1C W/O EAG  
 2. Hypercholesterolemia E78.00 272.0 LIPID PANEL 3. Essential hypertension U71 600.3 METABOLIC PANEL, COMPREHENSIVE 4. Vitamin D deficiency E55.9 268.9 Well controlled on current supplementation VITAMIN D, 25 HYDROXY 5. Morbid obesity ([address] E66.01 278.01 Pt to work on trying to cut back calories to lose weight 6. Mild persistent asthma without complication S65.15 694.22 Stable on fluticasone propionate (FLOVENT HFA) 44 mcg/actuation inhaler Diabetic issues reviewed with her: diabetic diet discussed in detail, and low cholesterol diet, weight control and daily exercise discussed. Follow-up and Dispositions · Return in about 6 months (around 11/1/2019) for labs 1 week before. Reviewed plan of care. Patient has provided input and agrees with goals.

## 2019-05-01 NOTE — PATIENT INSTRUCTIONS
High Blood Pressure: Care Instructions Overview It's normal for blood pressure to go up and down throughout the day. But if it stays up, you have high blood pressure. Another name for high blood pressure is hypertension. Despite what a lot of people think, high blood pressure usually doesn't cause headaches or make you feel dizzy or lightheaded. It usually has no symptoms. But it does increase your risk of stroke, heart attack, and other problems. You and your doctor will talk about your risks of these problems based on your blood pressure. Your doctor will give you a goal for your blood pressure. Your goal will be based on your health and your age. Lifestyle changes, such as eating healthy and being active, are always important to help lower blood pressure. You might also take medicine to reach your blood pressure goal. 
Follow-up care is a key part of your treatment and safety. Be sure to make and go to all appointments, and call your doctor if you are having problems. It's also a good idea to know your test results and keep a list of the medicines you take. How can you care for yourself at home? Medical treatment · If you stop taking your medicine, your blood pressure will go back up. You may take one or more types of medicine to lower your blood pressure. Be safe with medicines. Take your medicine exactly as prescribed. Call your doctor if you think you are having a problem with your medicine. · Talk to your doctor before you start taking aspirin every day. Aspirin can help certain people lower their risk of a heart attack or stroke. But taking aspirin isn't right for everyone, because it can cause serious bleeding. · See your doctor regularly. You may need to see the doctor more often at first or until your blood pressure comes down. · If you are taking blood pressure medicine, talk to your doctor before you take decongestants or anti-inflammatory medicine, such as ibuprofen. Some of these medicines can raise blood pressure. · Learn how to check your blood pressure at home. Lifestyle changes · Stay at a healthy weight. This is especially important if you put on weight around the waist. Losing even 10 pounds can help you lower your blood pressure. · If your doctor recommends it, get more exercise. Walking is a good choice. Bit by bit, increase the amount you walk every day. Try for at least 30 minutes on most days of the week. You also may want to swim, bike, or do other activities. · Avoid or limit alcohol. Talk to your doctor about whether you can drink any alcohol. · Try to limit how much sodium you eat to less than 2,300 milligrams (mg) a day. Your doctor may ask you to try to eat less than 1,500 mg a day. · Eat plenty of fruits (such as bananas and oranges), vegetables, legumes, whole grains, and low-fat dairy products. · Lower the amount of saturated fat in your diet. Saturated fat is found in animal products such as milk, cheese, and meat. Limiting these foods may help you lose weight and also lower your risk for heart disease. · Do not smoke. Smoking increases your risk for heart attack and stroke. If you need help quitting, talk to your doctor about stop-smoking programs and medicines. These can increase your chances of quitting for good. When should you call for help? Call 911 anytime you think you may need emergency care. This may mean having symptoms that suggest that your blood pressure is causing a serious heart or blood vessel problem. Your blood pressure may be over 180/120. 
 For example, call 911 if: 
  · You have symptoms of a heart attack. These may include: 
? Chest pain or pressure, or a strange feeling in the chest. 
? Sweating. ? Shortness of breath. ? Nausea or vomiting. ? Pain, pressure, or a strange feeling in the back, neck, jaw, or upper belly or in one or both shoulders or arms. ? Lightheadedness or sudden weakness. ? A fast or irregular heartbeat.  
  · You have symptoms of a stroke. These may include: 
? Sudden numbness, tingling, weakness, or loss of movement in your face, arm, or leg, especially on only one side of your body. ? Sudden vision changes. ? Sudden trouble speaking. ? Sudden confusion or trouble understanding simple statements. ? Sudden problems with walking or balance. ? A sudden, severe headache that is different from past headaches.  
  · You have severe back or belly pain.  
 Do not wait until your blood pressure comes down on its own. Get help right away. 
 Call your doctor now or seek immediate care if: 
  · Your blood pressure is much higher than normal (such as 180/120 or higher), but you don't have symptoms.  
  · You think high blood pressure is causing symptoms, such as: 
? Severe headache. 
? Blurry vision.  
 Watch closely for changes in your health, and be sure to contact your doctor if: 
  · Your blood pressure measures higher than your doctor recommends at least 2 times. That means the top number is higher or the bottom number is higher, or both.  
  · You think you may be having side effects from your blood pressure medicine. Where can you learn more? Go to http://niko-shira.info/. Enter P543 in the search box to learn more about \"High Blood Pressure: Care Instructions. \" Current as of: July 22, 2018 Content Version: 11.9 © 1420-9049 [employer]. Care instructions adapted under license by [employer] (which disclaims liability or warranty for this information). If you have questions about a medical condition or this instruction, always ask your healthcare professional. [place] [address] any warranty or liability for your use of this information.

## 2019-05-06 ENCOUNTER — TELEPHONE (OUTPATIENT)
Dept: FAMILY MEDICINE CLINIC | Age: 64
End: 2019-05-06

## 2019-05-06 NOTE — TELEPHONE ENCOUNTER
----- Message from Anabell Ruiz MD sent at 5/4/2019  6:15 PM EDT -----  Try to get copy of foot exam from [facility] foot and ankle [id number]

## 2019-05-14 DIAGNOSIS — E11.9 TYPE 2 DIABETES MELLITUS WITHOUT COMPLICATION, WITH LONG-TERM CURRENT USE OF INSULIN (HCC): Chronic | ICD-10-CM

## 2019-05-14 DIAGNOSIS — Z79.4 TYPE 2 DIABETES MELLITUS WITHOUT COMPLICATION, WITH LONG-TERM CURRENT USE OF INSULIN (HCC): Chronic | ICD-10-CM

## 2019-05-22 NOTE — TELEPHONE ENCOUNTER
Requested Prescriptions     Pending Prescriptions Disp Refills    lisinopril-hydroCHLOROthiazide (PRINZIDE, ZESTORETIC) 10-12.5 mg per tablet 180 Tab 2     Sig: TAKE 1 TABELT BY MOUTH TWICE DAILY

## 2019-05-23 RX ORDER — LISINOPRIL AND HYDROCHLOROTHIAZIDE 10; 12.5 MG/1; MG/1
TABLET ORAL
Qty: 180 TAB | Refills: 2 | Status: SHIPPED | OUTPATIENT
Start: 2019-05-23 | End: 2020-09-18 | Stop reason: SDUPTHER

## 2019-10-17 ENCOUNTER — HOSPITAL ENCOUNTER (OUTPATIENT)
Dept: MAMMOGRAPHY | Age: 64
Discharge: HOME | End: 2019-10-17
Attending: INTERNAL MEDICINE
Payer: COMMERCIAL

## 2019-10-17 DIAGNOSIS — Z12.31 VISIT FOR SCREENING MAMMOGRAM: ICD-10-CM

## 2019-10-17 PROCEDURE — 77067 SCR MAMMO BI INCL CAD: CPT

## 2019-10-25 ENCOUNTER — HOSPITAL ENCOUNTER (OUTPATIENT)
Dept: LAB | Age: 64
Discharge: HOME | End: 2019-10-25
Payer: COMMERCIAL

## 2019-10-25 DIAGNOSIS — I10 ESSENTIAL HYPERTENSION: ICD-10-CM

## 2019-10-25 DIAGNOSIS — E78.00 HYPERCHOLESTEROLEMIA: ICD-10-CM

## 2019-10-25 DIAGNOSIS — E55.9 VITAMIN D DEFICIENCY: ICD-10-CM

## 2019-10-25 DIAGNOSIS — Z79.4 TYPE 2 DIABETES MELLITUS WITHOUT COMPLICATION, WITH LONG-TERM CURRENT USE OF INSULIN (HCC): ICD-10-CM

## 2019-10-25 DIAGNOSIS — E11.9 TYPE 2 DIABETES MELLITUS WITHOUT COMPLICATION, WITH LONG-TERM CURRENT USE OF INSULIN (HCC): ICD-10-CM

## 2019-10-25 LAB
ALBUMIN SERPL-MCNC: 3.7 G/DL (ref 3.4–5)
ALBUMIN/GLOB SERPL: 1.1 {RATIO} (ref 0.8–1.7)
ALP SERPL-CCNC: 141 U/L (ref 45–117)
ALT SERPL-CCNC: 24 U/L (ref 13–56)
ANION GAP SERPL CALC-SCNC: 6 MMOL/L (ref 3–18)
AST SERPL-CCNC: 11 U/L (ref 10–38)
BILIRUB SERPL-MCNC: 0.2 MG/DL (ref 0.2–1)
BUN SERPL-MCNC: 23 MG/DL (ref 7–18)
BUN/CREAT SERPL: 19 (ref 12–20)
CALCIUM SERPL-MCNC: 10 MG/DL (ref 8.5–10.1)
CHLORIDE SERPL-SCNC: 104 MMOL/L (ref 100–111)
CO2 SERPL-SCNC: 30 MMOL/L (ref 21–32)
CREAT SERPL-MCNC: 1.18 MG/DL (ref 0.6–1.3)
GLOBULIN SER CALC-MCNC: 3.5 G/DL (ref 2–4)
GLUCOSE SERPL-MCNC: 93 MG/DL (ref 74–99)
HBA1C MFR BLD: 7.6 % (ref 4.2–5.6)
POTASSIUM SERPL-SCNC: 4.7 MMOL/L (ref 3.5–5.5)
PROT SERPL-MCNC: 7.2 G/DL (ref 6.4–8.2)
SODIUM SERPL-SCNC: 140 MMOL/L (ref 136–145)

## 2019-10-25 PROCEDURE — 80053 COMPREHEN METABOLIC PANEL: CPT

## 2019-10-25 PROCEDURE — 80061 LIPID PANEL: CPT

## 2019-10-25 PROCEDURE — 82306 VITAMIN D 25 HYDROXY: CPT

## 2019-10-25 PROCEDURE — 83036 HEMOGLOBIN GLYCOSYLATED A1C: CPT

## 2019-10-25 PROCEDURE — 82043 UR ALBUMIN QUANTITATIVE: CPT

## 2019-10-25 PROCEDURE — 36415 COLL VENOUS BLD VENIPUNCTURE: CPT

## 2019-10-26 LAB
25(OH)D3 SERPL-MCNC: 35.3 NG/ML (ref 30–100)
CHOLEST SERPL-MCNC: 239 MG/DL
CREAT UR-MCNC: 115 MG/DL (ref 30–125)
HDLC SERPL-MCNC: 63 MG/DL (ref 40–60)
HDLC SERPL: 3.8 {RATIO} (ref 0–5)
LDLC SERPL CALC-MCNC: 138.2 MG/DL (ref 0–100)
LIPID PROFILE,FLP: ABNORMAL
MICROALBUMIN UR-MCNC: 3.25 MG/DL (ref 0–3)
MICROALBUMIN/CREAT UR-RTO: 28 MG/G (ref 0–30)
TRIGL SERPL-MCNC: 189 MG/DL (ref ?–150)
VLDLC SERPL CALC-MCNC: 37.8 MG/DL

## 2019-11-01 ENCOUNTER — OFFICE VISIT (OUTPATIENT)
Dept: FAMILY MEDICINE CLINIC | Age: 64
End: 2019-11-01

## 2019-11-01 VITALS
SYSTOLIC BLOOD PRESSURE: 130 MMHG | RESPIRATION RATE: 20 BRPM | DIASTOLIC BLOOD PRESSURE: 74 MMHG | HEART RATE: 95 BPM | TEMPERATURE: 97.5 F | OXYGEN SATURATION: 98 % | WEIGHT: 240 LBS | HEIGHT: 65 IN | BODY MASS INDEX: 39.99 KG/M2

## 2019-11-01 DIAGNOSIS — Z79.4 TYPE 2 DIABETES MELLITUS WITHOUT COMPLICATION, WITH LONG-TERM CURRENT USE OF INSULIN (HCC): Primary | ICD-10-CM

## 2019-11-01 DIAGNOSIS — Z12.11 COLON CANCER SCREENING: ICD-10-CM

## 2019-11-01 DIAGNOSIS — I10 ESSENTIAL HYPERTENSION: ICD-10-CM

## 2019-11-01 DIAGNOSIS — E55.9 VITAMIN D DEFICIENCY: ICD-10-CM

## 2019-11-01 DIAGNOSIS — E66.01 CLASS 2 SEVERE OBESITY DUE TO EXCESS CALORIES WITH SERIOUS COMORBIDITY AND BODY MASS INDEX (BMI) OF 39.0 TO 39.9 IN ADULT (HCC): ICD-10-CM

## 2019-11-01 DIAGNOSIS — E78.00 HYPERCHOLESTEROLEMIA: ICD-10-CM

## 2019-11-01 DIAGNOSIS — E11.9 TYPE 2 DIABETES MELLITUS WITHOUT COMPLICATION, WITH LONG-TERM CURRENT USE OF INSULIN (HCC): Primary | ICD-10-CM

## 2019-11-01 NOTE — PATIENT INSTRUCTIONS

## 2019-11-01 NOTE — PROGRESS NOTES
Patient is in the office today for a 6 month follow up. Do you have an Advance Directive no  Do you want more information :information given    1. Have you been to the ER, urgent care clinic since your last visit? Hospitalized since your last visit? No    2. Have you seen or consulted any other health care providers outside of the [address] [place] [address] since your last visit? Include any pap smears or colon screening. yes, Dr. Bhaskar Hummel and Dr. Lionel Elam.

## 2019-11-01 NOTE — PROGRESS NOTES
Subjective:       Chief Complaint  The patient presents for follow up of diabetes, hypertension and high cholesterol. KRIS Vitale is a 59 y.o. female seen for follow up of diabetes. Shealso has hypertension and hyperlipidemia. Diabetes improved control, on multiple meds including, novolog 70/30  Insulin 50 units QAM and 50 units QPM, and Victoza 1.8 pt is already followed by Endo(Dr Schuster),  Endo recommends an insulin pump but pt is reluctant to do it, hypertension well controlled, no significant medication side effects noted, on Zestoretic, hyperlipidemia poorly controlled, off crestor which she stopped due tp leg cramps. She may benefit from [address] [place] [address] in the future. Pt has sleep apnea but does not use CPAP machine. Diet and Lifestyle: not attempting to follow a low fat, low cholesterol diet, does not rigorously follow a diabetic diet, exercises sporadically    Home BP Monitoring: is not measured at home. Diabetic Review of Systems - home glucose monitoring: is performed regularly, 3x/day. Other symptoms and concerns: pt needs to work on losing weight to improve her overall health her BMI of 40 puts her at increased risk of multiple medical problems    Vit D deficiency is well controlled on current supplementation. Pt continues to have knee pain due to arthritis. Asthma is well controlled on Flovent      Current Outpatient Medications   Medication Sig    liraglutide (VICTOZA 2-TALAT) 0.6 mg/0.1 mL (18 mg/3 mL) pnij 0.6 mg by SubCUTAneous route.     lisinopril-hydroCHLOROthiazide (PRINZIDE, ZESTORETIC) 10-12.5 mg per tablet TAKE 1 TABELT BY MOUTH TWICE DAILY    fluticasone propionate (FLOVENT HFA) 44 mcg/actuation inhaler INHALE TWO PUFFS BY MOUTH TWICE DAILY    insulin NPH/insulin regular (NOVOLIN 70/30, HUMULIN 70/30) 100 unit/mL (70-30) injection 50 units BID SQ ok to use Relion [employer] brand    albuterol (PROAIR HFA) 90 mcg/actuation inhaler Take 2 Puffs by inhalation every four (4) hours as needed for Wheezing.  azelastine (ASTELIN) 137 mcg (0.1 %) nasal spray 2 Sprays by Both Nostrils route two (2) times a day.  METANX, ALGAL OIL, 3 mg-35 mg-2 mg -90.314 mg cap     COMBIGAN 0.2-0.5 % drop ophthalmic solution     Calcium Carbonate-Vit D3-Min (CALTRATE 600+D PLUS MINERALS) 600 mg (1,500 mg)-400 unit Chew Take 1 Tab by mouth daily.  aspirin 81 mg Tab Take  by mouth daily.  cpap machine kit by Does Not Apply route. 1  Overnight CPAP at 11 CWP with ramp humidifier. 2  Mask: Silva and Pykel Aristieds or mask of choice. Supplies 99 month. Please send compliance data [id number]. Diagnosis VIKKI. AHI 17 RDI 18     No current facility-administered medications for this visit.               Review of Systems  Respiratory: negative for dyspnea on exertion  Cardiovascular: negative for chest pain    Objective:     Visit Vitals  /74 (BP 1 Location: Left arm, BP Patient Position: Sitting)   Pulse 95   Temp 97.5 °F (36.4 °C) (Oral)   Resp 20   Ht 5' 5\" (1.651 m)   Wt 240 lb (108.9 kg)   SpO2 98%   BMI 39.94 kg/m²        General appearance - alert, well appearing, and in no distress  Chest - clear to auscultation, no wheezes, rales or rhonchi, symmetric air entry  Heart - normal rate, regular rhythm, normal S1, S2, no murmurs, rubs, clicks or gallops  Extremities - peripheral pulses normal, no pedal edema, no clubbing or cyanosis      Labs:   Lab Results   Component Value Date/Time    Hemoglobin A1c 7.6 (H) 10/25/2019 08:20 AM    Hemoglobin A1c 9.3 (H) 06/06/2018 08:34 AM    Hemoglobin A1c 9.5 (H) 01/29/2018 08:13 AM    Hemoglobin A1c, External 8.8 01/08/2019    Hemoglobin A1c, External 10.1 % 05/23/2017    Hemoglobin A1c, External 10.3 11/03/2016    Glucose 93 10/25/2019 08:20 AM    Microalbumin/Creat ratio (mg/g creat) 28 10/25/2019 08:20 AM    Microalbumin,urine random 3.25 (H) 10/25/2019 08:20 AM    LDL, calculated 138.2 (H) 10/25/2019 08:20 AM    Creatinine, POC 1.5 (H) 01/07/2015 09:31 AM    Creatinine 1.18 10/25/2019 08:20 AM      Lab Results   Component Value Date/Time    Cholesterol, total 239 (H) 10/25/2019 08:20 AM    HDL Cholesterol 63 (H) 10/25/2019 08:20 AM    LDL, calculated 138.2 (H) 10/25/2019 08:20 AM    Triglyceride 189 (H) 10/25/2019 08:20 AM    CHOL/HDL Ratio 3.8 10/25/2019 08:20 AM       Lab Results   Component Value Date/Time    Sodium 140 10/25/2019 08:20 AM    Potassium 4.7 10/25/2019 08:20 AM    Chloride 104 10/25/2019 08:20 AM    CO2 30 10/25/2019 08:20 AM    Anion gap 6 10/25/2019 08:20 AM    Glucose 93 10/25/2019 08:20 AM    BUN 23 (H) 10/25/2019 08:20 AM    Creatinine 1.18 10/25/2019 08:20 AM    BUN/Creatinine ratio 19 10/25/2019 08:20 AM    GFR est AA 56 (L) 10/25/2019 08:20 AM    GFR est non-AA 46 (L) 10/25/2019 08:20 AM    Calcium 10.0 10/25/2019 08:20 AM    Bilirubin, total 0.2 10/25/2019 08:20 AM    ALT (SGPT) 24 10/25/2019 08:20 AM    AST (SGOT) 11 10/25/2019 08:20 AM    Alk. phosphatase 141 (H) 10/25/2019 08:20 AM    Protein, total 7.2 10/25/2019 08:20 AM    Albumin 3.7 10/25/2019 08:20 AM    Globulin 3.5 10/25/2019 08:20 AM    A-G Ratio 1.1 10/25/2019 08:20 AM              Assessment / Plan     Diabetes improved control, on current meds and followed by Endo. Pt will continue to try and lose 10 to 20 lbs  Hypertension well controlled, on Zestoretic. Hyperlipidemia patient unable to tolerate statins due to myalgias. She will conisder doing Heart Scan to see if Repatha is an option. ICD-10-CM ICD-9-CM    1. Type 2 diabetes mellitus without complication, with long-term current use of insulin ([facility]) E11.9 250.00 HEMOGLOBIN A1C W/O EAG    Z79.4 V58.67    2. Hypercholesterolemia E78.00 272.0 LIPID PANEL   3. Essential hypertension S64 320.9 METABOLIC PANEL, COMPREHENSIVE   4.  Vitamin D deficiency E55.9 268.9 Well controlled on current supplementation VITAMIN D, 25 HYDROXY   5. Class 2 severe obesity due to excess calories with serious comorbidity and body mass index (BMI) of 39.0 to 39.9 in adult ([facility] E66.01 278.01 Pt will work on diet and exercise to lose weight     Z68.39 V85.39    6. Colon cancer screening Z12.11 V76.51 REFERRAL TO GASTROENTEROLOGY                Diabetic issues reviewed with her: diabetic diet discussed in detail, and low cholesterol diet, weight control and daily exercise discussed. Follow-up and Dispositions    · Return in about 4 months (around 3/1/2020) for labs 1 week before. Reviewed plan of care. Patient has provided input and agrees with goals.

## 2020-02-24 ENCOUNTER — HOSPITAL ENCOUNTER (OUTPATIENT)
Dept: LAB | Age: 65
Discharge: HOME | End: 2020-02-24
Payer: COMMERCIAL

## 2020-02-24 DIAGNOSIS — E78.00 HYPERCHOLESTEROLEMIA: ICD-10-CM

## 2020-02-24 DIAGNOSIS — Z79.4 TYPE 2 DIABETES MELLITUS WITHOUT COMPLICATION, WITH LONG-TERM CURRENT USE OF INSULIN (HCC): ICD-10-CM

## 2020-02-24 DIAGNOSIS — I10 ESSENTIAL HYPERTENSION: ICD-10-CM

## 2020-02-24 DIAGNOSIS — E55.9 VITAMIN D DEFICIENCY: ICD-10-CM

## 2020-02-24 DIAGNOSIS — E11.9 TYPE 2 DIABETES MELLITUS WITHOUT COMPLICATION, WITH LONG-TERM CURRENT USE OF INSULIN (HCC): ICD-10-CM

## 2020-02-24 LAB
25(OH)D3 SERPL-MCNC: 79.4 NG/ML (ref 30–100)
ALBUMIN SERPL-MCNC: 3.7 G/DL (ref 3.4–5)
ALBUMIN/GLOB SERPL: 1 {RATIO} (ref 0.8–1.7)
ALP SERPL-CCNC: 147 U/L (ref 45–117)
ALT SERPL-CCNC: 26 U/L (ref 13–56)
ANION GAP SERPL CALC-SCNC: 6 MMOL/L (ref 3–18)
AST SERPL-CCNC: 15 U/L (ref 10–38)
BILIRUB SERPL-MCNC: 0.4 MG/DL (ref 0.2–1)
BUN SERPL-MCNC: 28 MG/DL (ref 7–18)
BUN/CREAT SERPL: 27 (ref 12–20)
CALCIUM SERPL-MCNC: 9.3 MG/DL (ref 8.5–10.1)
CHLORIDE SERPL-SCNC: 104 MMOL/L (ref 100–111)
CHOLEST SERPL-MCNC: 227 MG/DL
CO2 SERPL-SCNC: 29 MMOL/L (ref 21–32)
CREAT SERPL-MCNC: 1.05 MG/DL (ref 0.6–1.3)
GLOBULIN SER CALC-MCNC: 3.8 G/DL (ref 2–4)
GLUCOSE SERPL-MCNC: 74 MG/DL (ref 74–99)
HBA1C MFR BLD: 7.5 % (ref 4.2–5.6)
HDLC SERPL-MCNC: 63 MG/DL (ref 40–60)
HDLC SERPL: 3.6 {RATIO} (ref 0–5)
LDLC SERPL CALC-MCNC: 143.6 MG/DL (ref 0–100)
LIPID PROFILE,FLP: ABNORMAL
POTASSIUM SERPL-SCNC: 4.6 MMOL/L (ref 3.5–5.5)
PROT SERPL-MCNC: 7.5 G/DL (ref 6.4–8.2)
SODIUM SERPL-SCNC: 139 MMOL/L (ref 136–145)
TRIGL SERPL-MCNC: 102 MG/DL (ref ?–150)
VLDLC SERPL CALC-MCNC: 20.4 MG/DL

## 2020-02-24 PROCEDURE — 36415 COLL VENOUS BLD VENIPUNCTURE: CPT

## 2020-02-24 PROCEDURE — 82306 VITAMIN D 25 HYDROXY: CPT

## 2020-02-24 PROCEDURE — 80061 LIPID PANEL: CPT

## 2020-02-24 PROCEDURE — 83036 HEMOGLOBIN GLYCOSYLATED A1C: CPT

## 2020-02-24 PROCEDURE — 80053 COMPREHEN METABOLIC PANEL: CPT

## 2020-03-02 ENCOUNTER — OFFICE VISIT (OUTPATIENT)
Dept: FAMILY MEDICINE CLINIC | Age: 65
End: 2020-03-02

## 2020-03-02 VITALS
OXYGEN SATURATION: 98 % | WEIGHT: 241 LBS | BODY MASS INDEX: 40.15 KG/M2 | RESPIRATION RATE: 20 BRPM | SYSTOLIC BLOOD PRESSURE: 140 MMHG | DIASTOLIC BLOOD PRESSURE: 80 MMHG | TEMPERATURE: 97.6 F | HEIGHT: 65 IN | HEART RATE: 102 BPM

## 2020-03-02 DIAGNOSIS — E66.01 MORBID OBESITY (HCC): ICD-10-CM

## 2020-03-02 DIAGNOSIS — Z79.4 TYPE 2 DIABETES MELLITUS WITHOUT COMPLICATION, WITH LONG-TERM CURRENT USE OF INSULIN (HCC): Primary | ICD-10-CM

## 2020-03-02 DIAGNOSIS — I10 ESSENTIAL HYPERTENSION: ICD-10-CM

## 2020-03-02 DIAGNOSIS — E55.9 VITAMIN D DEFICIENCY: ICD-10-CM

## 2020-03-02 DIAGNOSIS — E78.00 HYPERCHOLESTEROLEMIA: ICD-10-CM

## 2020-03-02 DIAGNOSIS — E11.9 TYPE 2 DIABETES MELLITUS WITHOUT COMPLICATION, WITH LONG-TERM CURRENT USE OF INSULIN (HCC): Primary | ICD-10-CM

## 2020-03-02 RX ORDER — ALCOHOL 2.38 KG/3.79L
1 GEL TOPICAL DAILY
Qty: 90 CAP | Refills: 3 | Status: SHIPPED | OUTPATIENT
Start: 2020-03-02 | End: 2021-06-03 | Stop reason: SDUPTHER

## 2020-03-02 NOTE — PROGRESS NOTES
Subjective:       Chief Complaint  The patient presents for follow up of diabetes, hypertension and high cholesterol. KRIS San is a 59 y.o. female seen for follow up of diabetes. Shealso has hypertension and hyperlipidemia. Diabetes improved control, on multiple meds including, novolog 70/30  Insulin 50 units QAM and 50 units QPM, and Victoza 1.8 pt is already followed by Endo(Dr Schsuter),  Endo recommends an insulin pump but pt is reluctant to do it, hypertension well controlled, no significant medication side effects noted, on Zestoretic, pt did not take dose this AM, hyperlipidemia poorly controlled, off crestor which she stopped due tp leg cramps. She may benefit from [address] [place] [address] in the future. Pt has sleep apnea but does not use CPAP machine. Diet and Lifestyle: not attempting to follow a low fat, low cholesterol diet, does not rigorously follow a diabetic diet, exercises sporadically    Home BP Monitoring: is not measured at home. Diabetic Review of Systems - home glucose monitoring: is performed regularly, 3x/day. Other symptoms and concerns: pt needs to work on losing weight to improve her overall health her BMI of 40 puts her at increased risk of multiple medical problems    Vit D deficiency is well controlled on current supplementation. She can cut back to 6x/week    Pt continues to have knee pain due to arthritis. Asthma is well controlled on Flovent      Current Outpatient Medications   Medication Sig    levomefolate-B6-meB12-algal oil (METANX, ALGAL OIL,) 3 mg-35 mg-2 mg -90.314 mg cap Take 1 Cap by mouth daily.  insulin NPH/insulin regular (NOVOLIN 70/30 U-100 INSULIN) 100 unit/mL (70-30) injection INJECT 50 UNITS            SUBCUTANEOUSLY TWO TIMES A DAY    liraglutide (VICTOZA 2-TALAT) 0.6 mg/0.1 mL (18 mg/3 mL) pnij 0.6 mg by SubCUTAneous route.     lisinopril-hydroCHLOROthiazide (PRINZIDE, ZESTORETIC) 10-12.5 mg per tablet TAKE 1 TABELT BY MOUTH TWICE DAILY  fluticasone propionate (FLOVENT HFA) 44 mcg/actuation inhaler INHALE TWO PUFFS BY MOUTH TWICE DAILY    albuterol (PROAIR HFA) 90 mcg/actuation inhaler Take 2 Puffs by inhalation every four (4) hours as needed for Wheezing.  azelastine (ASTELIN) 137 mcg (0.1 %) nasal spray 2 Sprays by Both Nostrils route two (2) times a day.  cpap machine kit by Does Not Apply route. 1  Overnight CPAP at 11 CWP with ramp humidifier. 2  Mask: Ricardo and Ramya Aristides or mask of choice. Supplies 99 month. Please send compliance data [id number]. Diagnosis VIKKI. AHI 17 RDI 18    COMBIGAN 0.2-0.5 % drop ophthalmic solution     Calcium Carbonate-Vit D3-Min (CALTRATE 600+D PLUS MINERALS) 600 mg (1,500 mg)-400 unit Chew Take 1 Tab by mouth daily.  aspirin 81 mg Tab Take  by mouth daily. No current facility-administered medications for this visit.               Review of Systems  Respiratory: negative for dyspnea on exertion  Cardiovascular: negative for chest pain    Objective:     Visit Vitals  /80   Pulse (!) 102   Temp 97.6 °F (36.4 °C) (Oral)   Resp 20   Ht 5' 5\" (1.651 m)   Wt 241 lb (109.3 kg)   SpO2 98%   BMI 40.10 kg/m²        General appearance - alert, well appearing, and in no distress  Chest - clear to auscultation, no wheezes, rales or rhonchi, symmetric air entry  Heart - normal rate, regular rhythm, normal S1, S2, no murmurs, rubs, clicks or gallops  Extremities - peripheral pulses normal, no pedal edema, no clubbing or cyanosis      Labs:   Lab Results   Component Value Date/Time    Hemoglobin A1c 7.5 (H) 02/24/2020 09:12 AM    Hemoglobin A1c 7.6 (H) 10/25/2019 08:20 AM    Hemoglobin A1c 9.3 (H) 06/06/2018 08:34 AM    Hemoglobin A1c, External 8.8 01/08/2019    Hemoglobin A1c, External 10.1 % 05/23/2017    Hemoglobin A1c, External 10.3 11/03/2016    Glucose 74 02/24/2020 09:12 AM    Microalbumin/Creat ratio (mg/g creat) 28 10/25/2019 08:20 AM    Microalbumin,urine random 3.25 (H) 10/25/2019 08:20 AM    LDL, calculated 143.6 (H) 02/24/2020 09:12 AM    Creatinine, POC 1.5 (H) 01/07/2015 09:31 AM    Creatinine 1.05 02/24/2020 09:12 AM      Lab Results   Component Value Date/Time    Cholesterol, total 227 (H) 02/24/2020 09:12 AM    HDL Cholesterol 63 (H) 02/24/2020 09:12 AM    LDL, calculated 143.6 (H) 02/24/2020 09:12 AM    Triglyceride 102 02/24/2020 09:12 AM    CHOL/HDL Ratio 3.6 02/24/2020 09:12 AM       Lab Results   Component Value Date/Time    Sodium 139 02/24/2020 09:12 AM    Potassium 4.6 02/24/2020 09:12 AM    Chloride 104 02/24/2020 09:12 AM    CO2 29 02/24/2020 09:12 AM    Anion gap 6 02/24/2020 09:12 AM    Glucose 74 02/24/2020 09:12 AM    BUN 28 (H) 02/24/2020 09:12 AM    Creatinine 1.05 02/24/2020 09:12 AM    BUN/Creatinine ratio 27 (H) 02/24/2020 09:12 AM    GFR est AA >60 02/24/2020 09:12 AM    GFR est non-AA 53 (L) 02/24/2020 09:12 AM    Calcium 9.3 02/24/2020 09:12 AM    Bilirubin, total 0.4 02/24/2020 09:12 AM    ALT (SGPT) 26 02/24/2020 09:12 AM    AST (SGOT) 15 02/24/2020 09:12 AM    Alk. phosphatase 147 (H) 02/24/2020 09:12 AM    Protein, total 7.5 02/24/2020 09:12 AM    Albumin 3.7 02/24/2020 09:12 AM    Globulin 3.8 02/24/2020 09:12 AM    A-G Ratio 1.0 02/24/2020 09:12 AM              Assessment / Plan     Diabetes improved control, on current meds and followed by Endo. Pt will continue to try and lose 10 to 20 lbs  Hypertension well controlled, on Zestoretic. Hyperlipidemia patient unable to tolerate statins due to myalgias. She will conisder doing Heart Scan to see if Repatha is an option. ICD-10-CM ICD-9-CM    1. Type 2 diabetes mellitus without complication, with long-term current use of insulin (HCC) E11.9 250.00 HEMOGLOBIN A1C W/O EAG    Z79.4 V58.67    2. Hypercholesterolemia E78.00 272.0 LIPID PANEL   3. Essential hypertension D54 645.7 METABOLIC PANEL, COMPREHENSIVE   4. Vitamin D deficiency E55.9 268.9 Levels a little too high.  Pt to decrease supplementation to 6x/week VITAMIN D, 25 HYDROXY   5. Morbid obesity ([facility] [address] E66.01 278.01 Pt will try to cut back starches and sweets in her diet                 Diabetic issues reviewed with her: diabetic diet discussed in detail, and low cholesterol diet, weight control and daily exercise discussed. Follow-up and Dispositions    · Return in about 4 months (around 7/2/2020) for labs 1 week before. Reviewed plan of care. Patient has provided input and agrees with goals.

## 2020-03-02 NOTE — PATIENT INSTRUCTIONS
High Blood Pressure: Care Instructions Overview It's normal for blood pressure to go up and down throughout the day. But if it stays up, you have high blood pressure. Another name for high blood pressure is hypertension. Despite what a lot of people think, high blood pressure usually doesn't cause headaches or make you feel dizzy or lightheaded. It usually has no symptoms. But it does increase your risk of stroke, heart attack, and other problems. You and your doctor will talk about your risks of these problems based on your blood pressure. Your doctor will give you a goal for your blood pressure. Your goal will be based on your health and your age. Lifestyle changes, such as eating healthy and being active, are always important to help lower blood pressure. You might also take medicine to reach your blood pressure goal. 
Follow-up care is a key part of your treatment and safety. Be sure to make and go to all appointments, and call your doctor if you are having problems. It's also a good idea to know your test results and keep a list of the medicines you take. How can you care for yourself at home? Medical treatment · If you stop taking your medicine, your blood pressure will go back up. You may take one or more types of medicine to lower your blood pressure. Be safe with medicines. Take your medicine exactly as prescribed. Call your doctor if you think you are having a problem with your medicine. · Talk to your doctor before you start taking aspirin every day. Aspirin can help certain people lower their risk of a heart attack or stroke. But taking aspirin isn't right for everyone, because it can cause serious bleeding. · See your doctor regularly. You may need to see the doctor more often at first or until your blood pressure comes down. · If you are taking blood pressure medicine, talk to your doctor before you take decongestants or anti-inflammatory medicine, such as ibuprofen. Some of these medicines can raise blood pressure. · Learn how to check your blood pressure at home. Lifestyle changes · Stay at a healthy weight. This is especially important if you put on weight around the waist. Losing even 10 pounds can help you lower your blood pressure. · If your doctor recommends it, get more exercise. Walking is a good choice. Bit by bit, increase the amount you walk every day. Try for at least 30 minutes on most days of the week. You also may want to swim, bike, or do other activities. · Avoid or limit alcohol. Talk to your doctor about whether you can drink any alcohol. · Try to limit how much sodium you eat to less than 2,300 milligrams (mg) a day. Your doctor may ask you to try to eat less than 1,500 mg a day. · Eat plenty of fruits (such as bananas and oranges), vegetables, legumes, whole grains, and low-fat dairy products. · Lower the amount of saturated fat in your diet. Saturated fat is found in animal products such as milk, cheese, and meat. Limiting these foods may help you lose weight and also lower your risk for heart disease. · Do not smoke. Smoking increases your risk for heart attack and stroke. If you need help quitting, talk to your doctor about stop-smoking programs and medicines. These can increase your chances of quitting for good. When should you call for help? Call  911 anytime you think you may need emergency care. This may mean having symptoms that suggest that your blood pressure is causing a serious heart or blood vessel problem. Your blood pressure may be over 180/120. 
 For example, call  911 if: 
  · You have symptoms of a heart attack. These may include: 
? Chest pain or pressure, or a strange feeling in the chest. 
? Sweating. ? Shortness of breath. ? Nausea or vomiting. ? Pain, pressure, or a strange feeling in the back, neck, jaw, or upper belly or in one or both shoulders or arms. ? Lightheadedness or sudden weakness. ? A fast or irregular heartbeat.  
  · You have symptoms of a stroke. These may include: 
? Sudden numbness, tingling, weakness, or loss of movement in your face, arm, or leg, especially on only one side of your body. ? Sudden vision changes. ? Sudden trouble speaking. ? Sudden confusion or trouble understanding simple statements. ? Sudden problems with walking or balance. ? A sudden, severe headache that is different from past headaches.  
  · You have severe back or belly pain.  
 Do not wait until your blood pressure comes down on its own. Get help right away. 
 Call your doctor now or seek immediate care if: 
  · Your blood pressure is much higher than normal (such as 180/120 or higher), but you don't have symptoms.  
  · You think high blood pressure is causing symptoms, such as: 
? Severe headache. 
? Blurry vision.  
 Watch closely for changes in your health, and be sure to contact your doctor if: 
  · Your blood pressure measures higher than your doctor recommends at least 2 times. That means the top number is higher or the bottom number is higher, or both.  
  · You think you may be having side effects from your blood pressure medicine. Where can you learn more? Go to http://niko-shira.info/. Enter C264 in the search box to learn more about \"High Blood Pressure: Care Instructions. \" Current as of: April 9, 2019 Content Version: 12.2 © 5345-1978 [employer]. Care instructions adapted under license by [employer] (which disclaims liability or warranty for this information). If you have questions about a medical condition or this instruction, always ask your healthcare professional. [place] [address] any warranty or liability for your use of this information.

## 2020-03-02 NOTE — PROGRESS NOTES
Patient is in the office today for a 4 month follow up. 1. Have you been to the ER, urgent care clinic since your last visit? Hospitalized since your last visit? No    2. Have you seen or consulted any other health care providers outside of the [address] [place] [address] since your last visit? Include any pap smears or colon screening. Yes, Dr. Preet Arechiga OB/GYN.

## 2020-03-19 DIAGNOSIS — J45.30 MILD PERSISTENT ASTHMA WITHOUT COMPLICATION: ICD-10-CM

## 2020-03-19 RX ORDER — FLUTICASONE PROPIONATE 44 UG/1
AEROSOL, METERED RESPIRATORY (INHALATION)
Qty: 31.8 G | Refills: 3 | Status: SHIPPED | OUTPATIENT
Start: 2020-03-19 | End: 2021-06-03 | Stop reason: SDUPTHER

## 2020-09-14 LAB — COLONOSCOPY, EXTERNAL: NORMAL

## 2020-09-18 RX ORDER — LISINOPRIL AND HYDROCHLOROTHIAZIDE 10; 12.5 MG/1; MG/1
TABLET ORAL
Qty: 180 TAB | Refills: 2 | Status: SHIPPED | OUTPATIENT
Start: 2020-09-18 | End: 2021-05-18 | Stop reason: SDUPTHER

## 2020-09-18 NOTE — TELEPHONE ENCOUNTER
Last Visit: 3/2/20 with MD Encarnacion  Next Appointment: Advised to follow-up in 4 months  Previous Refill Encounter(s): 5/23/19 #180 with 2 refills    Requested Prescriptions     Pending Prescriptions Disp Refills    lisinopril-hydroCHLOROthiazide (PRINZIDE, ZESTORETIC) 10-12.5 mg per tablet 180 Tab 2     Sig: TAKE 1 TABELT BY MOUTH TWICE DAILY

## 2020-11-10 ENCOUNTER — TELEPHONE (OUTPATIENT)
Dept: INTERNAL MEDICINE CLINIC | Age: 65
End: 2020-11-10

## 2020-11-10 NOTE — TELEPHONE ENCOUNTER
Patient just turned 72 and she now has [employer]. She has dropped off a verification of chronic condition form and need to be signed by Dr. Jeffery Maharaj. Call patient when ready for .

## 2020-11-12 ENCOUNTER — TELEPHONE (OUTPATIENT)
Dept: INTERNAL MEDICINE CLINIC | Age: 65
End: 2020-11-12

## 2020-11-18 ENCOUNTER — HOSPITAL ENCOUNTER (OUTPATIENT)
Dept: MAMMOGRAPHY | Age: 65
Discharge: HOME | End: 2020-11-18
Attending: INTERNAL MEDICINE
Payer: MEDICARE

## 2020-11-18 DIAGNOSIS — Z12.31 VISIT FOR SCREENING MAMMOGRAM: ICD-10-CM

## 2020-11-18 PROCEDURE — 77063 BREAST TOMOSYNTHESIS BI: CPT

## 2021-05-10 DIAGNOSIS — E78.00 HYPERCHOLESTEROLEMIA: Chronic | ICD-10-CM

## 2021-05-10 DIAGNOSIS — I10 ESSENTIAL HYPERTENSION: Chronic | ICD-10-CM

## 2021-05-10 DIAGNOSIS — E55.9 VITAMIN D DEFICIENCY: ICD-10-CM

## 2021-05-10 DIAGNOSIS — E11.21 TYPE 2 DIABETES WITH NEPHROPATHY (HCC): Primary | ICD-10-CM

## 2021-05-10 NOTE — TELEPHONE ENCOUNTER
Last Visit: 3/2/20 with MD Encarnacion  Next Appointment: Advised to follow-up in 4 months  Previous Refill Encounter(s): 3/2/20 #90 with 3 refills    Requested Prescriptions     Pending Prescriptions Disp Refills    levomefolate-B6-meB12-algal oil (Metanx, algal oil,) 3 mg-35 mg-2 mg -90.314 mg cap 90 Cap 3     Sig: Take 1 Cap by mouth daily.

## 2021-05-12 RX ORDER — ALCOHOL 2.38 KG/3.79L
1 GEL TOPICAL DAILY
Qty: 90 CAP | Refills: 3 | OUTPATIENT
Start: 2021-05-12

## 2021-05-18 RX ORDER — LISINOPRIL AND HYDROCHLOROTHIAZIDE 10; 12.5 MG/1; MG/1
TABLET ORAL
Qty: 180 TAB | Refills: 0 | Status: SHIPPED | OUTPATIENT
Start: 2021-05-18 | End: 2021-07-15

## 2021-05-18 NOTE — TELEPHONE ENCOUNTER
Last Visit: 3/2/20 with MD Encarnacion  Next Appointment: Advised to follow-up in 4 months  Previous Refill Encounter(s): 12/13/19 Novolin #70 with 5 refills, 7/18/20 Prinzide #180 with 2 refills    Requested Prescriptions     Pending Prescriptions Disp Refills    lisinopril-hydroCHLOROthiazide (PRINZIDE, ZESTORETIC) 10-12.5 mg per tablet 180 Tab 2     Sig: TAKE 1 TABELT BY MOUTH TWICE DAILY    insulin NPH/insulin regular (NovoLIN 70/30 U-100 Insulin) 100 unit/mL (70-30) injection 70 mL 5     Sig: INJECT 50 UNITS            SUBCUTANEOUSLY TWO TIMES A DAY

## 2021-05-27 ENCOUNTER — APPOINTMENT (OUTPATIENT)
Dept: INTERNAL MEDICINE CLINIC | Age: 66
End: 2021-05-27

## 2021-05-27 DIAGNOSIS — E11.21 TYPE 2 DIABETES WITH NEPHROPATHY (HCC): ICD-10-CM

## 2021-05-27 DIAGNOSIS — E78.00 HYPERCHOLESTEROLEMIA: Chronic | ICD-10-CM

## 2021-05-27 DIAGNOSIS — E55.9 VITAMIN D DEFICIENCY: ICD-10-CM

## 2021-05-27 DIAGNOSIS — I10 ESSENTIAL HYPERTENSION: Chronic | ICD-10-CM

## 2021-05-28 LAB
25(OH)D3+25(OH)D2 SERPL-MCNC: 35.7 NG/ML (ref 30–100)
ALBUMIN SERPL-MCNC: 4.5 G/DL (ref 3.8–4.8)
ALBUMIN/CREAT UR: 10 MG/G CREAT (ref 0–29)
ALBUMIN/GLOB SERPL: 1.6 {RATIO} (ref 1.2–2.2)
ALP SERPL-CCNC: 135 IU/L (ref 48–121)
ALT SERPL-CCNC: 15 IU/L (ref 0–32)
AST SERPL-CCNC: 18 IU/L (ref 0–40)
BILIRUB SERPL-MCNC: <0.2 MG/DL (ref 0–1.2)
BUN SERPL-MCNC: 26 MG/DL (ref 8–27)
BUN/CREAT SERPL: 20 (ref 12–28)
CALCIUM SERPL-MCNC: 9.8 MG/DL (ref 8.7–10.3)
CHLORIDE SERPL-SCNC: 103 MMOL/L (ref 96–106)
CHOLEST SERPL-MCNC: 252 MG/DL (ref 100–199)
CO2 SERPL-SCNC: 22 MMOL/L (ref 20–29)
CREAT SERPL-MCNC: 1.27 MG/DL (ref 0.57–1)
CREAT UR-MCNC: 74.9 MG/DL
GLOBULIN SER CALC-MCNC: 2.8 G/DL (ref 1.5–4.5)
GLUCOSE SERPL-MCNC: 79 MG/DL (ref 65–99)
HDLC SERPL-MCNC: 76 MG/DL
IMP & REVIEW OF LAB RESULTS: NORMAL
INTERPRETATION: NORMAL
LDLC SERPL CALC-MCNC: 160 MG/DL (ref 0–99)
MICROALBUMIN UR-MCNC: 7.6 UG/ML
POTASSIUM SERPL-SCNC: 4.9 MMOL/L (ref 3.5–5.2)
PROT SERPL-MCNC: 7.3 G/DL (ref 6–8.5)
SODIUM SERPL-SCNC: 142 MMOL/L (ref 134–144)
TRIGL SERPL-MCNC: 96 MG/DL (ref 0–149)
VLDLC SERPL CALC-MCNC: 16 MG/DL (ref 5–40)

## 2021-06-03 ENCOUNTER — OFFICE VISIT (OUTPATIENT)
Dept: INTERNAL MEDICINE CLINIC | Age: 66
End: 2021-06-03
Payer: MEDICARE

## 2021-06-03 VITALS
TEMPERATURE: 97.7 F | HEART RATE: 75 BPM | DIASTOLIC BLOOD PRESSURE: 59 MMHG | RESPIRATION RATE: 16 BRPM | WEIGHT: 250 LBS | OXYGEN SATURATION: 99 % | BODY MASS INDEX: 41.65 KG/M2 | SYSTOLIC BLOOD PRESSURE: 127 MMHG | HEIGHT: 65 IN

## 2021-06-03 DIAGNOSIS — Z00.00 WELCOME TO MEDICARE PREVENTIVE VISIT: Primary | ICD-10-CM

## 2021-06-03 DIAGNOSIS — N18.31 TYPE 2 DIABETES MELLITUS WITH STAGE 3A CHRONIC KIDNEY DISEASE, WITH LONG-TERM CURRENT USE OF INSULIN (HCC): ICD-10-CM

## 2021-06-03 DIAGNOSIS — E55.9 VITAMIN D DEFICIENCY: ICD-10-CM

## 2021-06-03 DIAGNOSIS — J45.30 MILD PERSISTENT ASTHMA WITHOUT COMPLICATION: ICD-10-CM

## 2021-06-03 DIAGNOSIS — Z23 ENCOUNTER FOR IMMUNIZATION: ICD-10-CM

## 2021-06-03 DIAGNOSIS — E11.22 TYPE 2 DIABETES MELLITUS WITH STAGE 3A CHRONIC KIDNEY DISEASE, WITH LONG-TERM CURRENT USE OF INSULIN (HCC): ICD-10-CM

## 2021-06-03 DIAGNOSIS — E78.00 HYPERCHOLESTEROLEMIA: ICD-10-CM

## 2021-06-03 DIAGNOSIS — E66.01 MORBID OBESITY (HCC): ICD-10-CM

## 2021-06-03 DIAGNOSIS — Z79.4 TYPE 2 DIABETES MELLITUS WITH STAGE 3A CHRONIC KIDNEY DISEASE, WITH LONG-TERM CURRENT USE OF INSULIN (HCC): ICD-10-CM

## 2021-06-03 DIAGNOSIS — I10 ESSENTIAL HYPERTENSION: ICD-10-CM

## 2021-06-03 LAB — HBA1C MFR BLD HPLC: 8.2 %

## 2021-06-03 PROCEDURE — G8536 NO DOC ELDER MAL SCRN: HCPCS | Performed by: INTERNAL MEDICINE

## 2021-06-03 PROCEDURE — G0402 INITIAL PREVENTIVE EXAM: HCPCS | Performed by: INTERNAL MEDICINE

## 2021-06-03 PROCEDURE — G8427 DOCREV CUR MEDS BY ELIG CLIN: HCPCS | Performed by: INTERNAL MEDICINE

## 2021-06-03 PROCEDURE — G8400 PT W/DXA NO RESULTS DOC: HCPCS | Performed by: INTERNAL MEDICINE

## 2021-06-03 PROCEDURE — G8752 SYS BP LESS 140: HCPCS | Performed by: INTERNAL MEDICINE

## 2021-06-03 PROCEDURE — 2022F DILAT RTA XM EVC RTNOPTHY: CPT | Performed by: INTERNAL MEDICINE

## 2021-06-03 PROCEDURE — 3052F HG A1C>EQUAL 8.0%<EQUAL 9.0%: CPT | Performed by: INTERNAL MEDICINE

## 2021-06-03 PROCEDURE — 83036 HEMOGLOBIN GLYCOSYLATED A1C: CPT | Performed by: INTERNAL MEDICINE

## 2021-06-03 PROCEDURE — G0009 ADMIN PNEUMOCOCCAL VACCINE: HCPCS | Performed by: INTERNAL MEDICINE

## 2021-06-03 PROCEDURE — 1090F PRES/ABSN URINE INCON ASSESS: CPT | Performed by: INTERNAL MEDICINE

## 2021-06-03 PROCEDURE — G8754 DIAS BP LESS 90: HCPCS | Performed by: INTERNAL MEDICINE

## 2021-06-03 PROCEDURE — 1101F PT FALLS ASSESS-DOCD LE1/YR: CPT | Performed by: INTERNAL MEDICINE

## 2021-06-03 PROCEDURE — 3017F COLORECTAL CA SCREEN DOC REV: CPT | Performed by: INTERNAL MEDICINE

## 2021-06-03 PROCEDURE — G8510 SCR DEP NEG, NO PLAN REQD: HCPCS | Performed by: INTERNAL MEDICINE

## 2021-06-03 PROCEDURE — G8417 CALC BMI ABV UP PARAM F/U: HCPCS | Performed by: INTERNAL MEDICINE

## 2021-06-03 PROCEDURE — G9899 SCRN MAM PERF RSLTS DOC: HCPCS | Performed by: INTERNAL MEDICINE

## 2021-06-03 PROCEDURE — 99214 OFFICE O/P EST MOD 30 MIN: CPT | Performed by: INTERNAL MEDICINE

## 2021-06-03 PROCEDURE — 90732 PPSV23 VACC 2 YRS+ SUBQ/IM: CPT | Performed by: INTERNAL MEDICINE

## 2021-06-03 RX ORDER — ALCOHOL 2.38 KG/3.79L
1 GEL TOPICAL DAILY
Qty: 90 CAPSULE | Refills: 3 | Status: SHIPPED | OUTPATIENT
Start: 2021-06-03

## 2021-06-03 RX ORDER — FLUTICASONE PROPIONATE 44 UG/1
AEROSOL, METERED RESPIRATORY (INHALATION)
Qty: 3 INHALER | Refills: 3 | Status: SHIPPED | OUTPATIENT
Start: 2021-06-03 | End: 2022-04-28

## 2021-06-03 NOTE — PROGRESS NOTES
Patient is in the office today for a 6 month follow up, and Welcome to Medicare. Do you have an Advance Directive no  Do you want more information :information given     1. Have you been to the ER, urgent care clinic since your last visit? Hospitalized since your last visit? No    2. Have you seen or consulted any other health care providers outside of the [address] [place] [address] since your last visit? Include any pap smears or colon screening. yes, Dr. Omar Shine GYN/Oncology.

## 2021-06-03 NOTE — PATIENT INSTRUCTIONS
High Blood Pressure: Care Instructions Overview It's normal for blood pressure to go up and down throughout the day. But if it stays up, you have high blood pressure. Another name for high blood pressure is hypertension. Despite what a lot of people think, high blood pressure usually doesn't cause headaches or make you feel dizzy or lightheaded. It usually has no symptoms. But it does increase your risk of stroke, heart attack, and other problems. You and your doctor will talk about your risks of these problems based on your blood pressure. Your doctor will give you a goal for your blood pressure. Your goal will be based on your health and your age. Lifestyle changes, such as eating healthy and being active, are always important to help lower blood pressure. You might also take medicine to reach your blood pressure goal. 
Follow-up care is a key part of your treatment and safety. Be sure to make and go to all appointments, and call your doctor if you are having problems. It's also a good idea to know your test results and keep a list of the medicines you take. How can you care for yourself at home? Medical treatment · If you stop taking your medicine, your blood pressure will go back up. You may take one or more types of medicine to lower your blood pressure. Be safe with medicines. Take your medicine exactly as prescribed. Call your doctor if you think you are having a problem with your medicine. · Talk to your doctor before you start taking aspirin every day. Aspirin can help certain people lower their risk of a heart attack or stroke. But taking aspirin isn't right for everyone, because it can cause serious bleeding. · See your doctor regularly. You may need to see the doctor more often at first or until your blood pressure comes down. · If you are taking blood pressure medicine, talk to your doctor before you take decongestants or anti-inflammatory medicine, such as ibuprofen.  Some of these medicines can raise blood pressure. · Learn how to check your blood pressure at home. Lifestyle changes · Stay at a healthy weight. This is especially important if you put on weight around the waist. Losing even 10 pounds can help you lower your blood pressure. · If your doctor recommends it, get more exercise. Walking is a good choice. Bit by bit, increase the amount you walk every day. Try for at least 30 minutes on most days of the week. You also may want to swim, bike, or do other activities. · Avoid or limit alcohol. Talk to your doctor about whether you can drink any alcohol. · Try to limit how much sodium you eat to less than 2,300 milligrams (mg) a day. Your doctor may ask you to try to eat less than 1,500 mg a day. · Eat plenty of fruits (such as bananas and oranges), vegetables, legumes, whole grains, and low-fat dairy products. · Lower the amount of saturated fat in your diet. Saturated fat is found in animal products such as milk, cheese, and meat. Limiting these foods may help you lose weight and also lower your risk for heart disease. · Do not smoke. Smoking increases your risk for heart attack and stroke. If you need help quitting, talk to your doctor about stop-smoking programs and medicines. These can increase your chances of quitting for good. When should you call for help? Call  911 anytime you think you may need emergency care. This may mean having symptoms that suggest that your blood pressure is causing a serious heart or blood vessel problem. Your blood pressure may be over 180/120. For example, call 911 if: 
  · You have symptoms of a heart attack. These may include: 
? Chest pain or pressure, or a strange feeling in the chest. 
? Sweating. ? Shortness of breath. ? Nausea or vomiting. ? Pain, pressure, or a strange feeling in the back, neck, jaw, or upper belly or in one or both shoulders or arms. ? Lightheadedness or sudden weakness.  
? A fast or irregular heartbeat.  
  · You have symptoms of a stroke. These may include: 
? Sudden numbness, tingling, weakness, or loss of movement in your face, arm, or leg, especially on only one side of your body. ? Sudden vision changes. ? Sudden trouble speaking. ? Sudden confusion or trouble understanding simple statements. ? Sudden problems with walking or balance. ? A sudden, severe headache that is different from past headaches.  
  · You have severe back or belly pain. Do not wait until your blood pressure comes down on its own. Get help right away. Call your doctor now or seek immediate care if: 
  · Your blood pressure is much higher than normal (such as 180/120 or higher), but you don't have symptoms.  
  · You think high blood pressure is causing symptoms, such as: 
? Severe headache. 
? Blurry vision. Watch closely for changes in your health, and be sure to contact your doctor if: 
  · Your blood pressure measures higher than your doctor recommends at least 2 times. That means the top number is higher or the bottom number is higher, or both.  
  · You think you may be having side effects from your blood pressure medicine. Where can you learn more? Go to http://www.gray.com/ Enter G092 in the search box to learn more about \"High Blood Pressure: Care Instructions. \" Current as of: August 31, 2020               Content Version: 12.8 © 2006-2021 [employer]. Care instructions adapted under license by [employer] (which disclaims liability or warranty for this information). If you have questions about a medical condition or this instruction, always ask your healthcare professional. [place] [address] any warranty or liability for your use of this information. Medicare Wellness Visit, Female The best way to live healthy is to have a lifestyle where you eat a well-balanced diet, exercise regularly, limit alcohol use, and quit all forms of tobacco/nicotine, if applicable. Regular preventive services are another way to keep healthy. Preventive services (vaccines, screening tests, monitoring & exams) can help personalize your care plan, which helps you manage your own care. Screening tests can find health problems at the earliest stages, when they are easiest to treat. [facility] follows the current, evidence-based guidelines published by the [facility] ([facility]) when recommending preventive services for our patients. Because we follow these guidelines, sometimes recommendations change over time as research supports it. (For example, mammograms used to be recommended annually. Even though Medicare will still pay for an annual mammogram, the newer guidelines recommend a mammogram every two years for women of average risk). Of course, you and your doctor may decide to screen more often for some diseases, based on your risk and your co-morbidities (chronic disease you are already diagnosed with). Preventive services for you include: - Medicare offers their members a free annual wellness visit, which is time for you and your primary care provider to discuss and plan for your preventive service needs. Take advantage of this benefit every year! 
-All adults over the age of 72 should receive the recommended pneumonia vaccines. Current USPSTF guidelines recommend a series of two vaccines for the best pneumonia protection.  
-All adults should have a flu vaccine yearly and a tetanus vaccine every 10 years.  
-All adults age 48 and older should receive the shingles vaccines (series of two vaccines).      
-All adults age 38-68 who are overweight should have a diabetes screening test once every three years.  
-All adults born between [birth year] and 1965 should be screened once for Hepatitis C. 
-Other screening tests and preventive services for persons with diabetes include: an eye exam to screen for diabetic retinopathy, a kidney function test, a foot exam, and stricter control over your cholesterol.  
-Cardiovascular screening for adults with routine risk involves an electrocardiogram (ECG) at intervals determined by your doctor.  
-Colorectal cancer screenings should be done for adults age 54-65 with no increased risk factors for colorectal cancer. There are a number of acceptable methods of screening for this type of cancer. Each test has its own benefits and drawbacks. Discuss with your doctor what is most appropriate for you during your annual wellness visit. The different tests include: colonoscopy (considered the best screening method), a fecal occult blood test, a fecal DNA test, and sigmoidoscopy. 
 
-A bone mass density test is recommended when a woman turns 65 to screen for osteoporosis. This test is only recommended one time, as a screening. Some providers will use this same test as a disease monitoring tool if you already have osteoporosis. -Breast cancer screenings are recommended every other year for women of normal risk, age 54-69. 
-Cervical cancer screenings for women over age 72 are only recommended with certain risk factors. Here is a list of your current Health Maintenance items (your personalized list of preventive services) with a due date: 
Health Maintenance Due Topic Date Due  
 COVID-19 Vaccine (1) Never done  DTaP/Tdap/Td  (1 - Tdap) Never done  Shingles Vaccine (1 of 2) Never done [facility]  12/28/2018  Bone Mineral Density   Never done  Pneumococcal Vaccine (1 of 1 - PPSV23) Never done  Hemoglobin A1C    02/24/2021 Vaccine Information Statement Influenza (Flu) Vaccine (Inactivated or Recombinant): What You Need to Know Many Vaccine Information Statements are available in [place] and other languages. See www.immunize.org/vis Hojas de información sobre vacunas están disponibles en español y en muchos otros idiomas. Visite www.immunize.org/vis 1.  Why get vaccinated? Influenza vaccine can prevent influenza (flu). Flu is a contagious disease that spreads around the [place] every year, usually between October and May. Anyone can get the flu, but it is more dangerous for some people. Infants and young children, people 72years of age and older, pregnant women, and people with certain health conditions or a weakened immune system are at greatest risk of flu complications. Pneumonia, bronchitis, sinus infections and ear infections are examples of flu-related complications. If you have a medical condition, such as heart disease, cancer or diabetes, flu can make it worse. Flu can cause fever and chills, sore throat, muscle aches, fatigue, cough, headache, and runny or stuffy nose. Some people may have vomiting and diarrhea, though this is more common in children than adults. Each year thousands of people in the [facility] die from flu, and many more are hospitalized. Flu vaccine prevents millions of illnesses and flu-related visits to the doctor each year. 2. Influenza vaccines CDC recommends everyone 10months of age and older get vaccinated every flu season. Children 6 months through 6years of age may need 2 doses during a single flu season. Everyone else needs only 1 dose each flu season. It takes about 2 weeks for protection to develop after vaccination. There are many flu viruses, and they are always changing. Each year a new flu vaccine is made to protect against three or four viruses that are likely to cause disease in the upcoming flu season. Even when the vaccine doesnt exactly match these viruses, it may still provide some protection. Influenza vaccine does not cause flu. Influenza vaccine may be given at the same time as other vaccines. 3. Talk with your health care provider Tell your vaccine provider if the person getting the vaccine: 
 Has had an allergic reaction after a previous dose of influenza vaccine, or has any severe, life-threatening allergies.  Has ever had Guillain-Barré Syndrome (also called GBS). In some cases, your health care provider may decide to postpone influenza vaccination to a future visit. People with minor illnesses, such as a cold, may be vaccinated. People who are moderately or severely ill should usually wait until they recover before getting influenza vaccine. Your health care provider can give you more information. 4. Risks of a reaction  Soreness, redness, and swelling where shot is given, fever, muscle aches, and headache can happen after influenza vaccine.  There may be a very small increased risk of Guillain-Barré Syndrome (GBS) after inactivated influenza vaccine (the flu shot). Mariann Olsen children who get the flu shot along with pneumococcal vaccine (PCV13), and/or DTaP vaccine at the same time might be slightly more likely to have a seizure caused by fever. Tell your health care provider if a child who is getting flu vaccine has ever had a seizure. People sometimes faint after medical procedures, including vaccination. Tell your provider if you feel dizzy or have vision changes or ringing in the ears. As with any medicine, there is a very remote chance of a vaccine causing a severe allergic reaction, other serious injury, or death. 5. What if there is a serious problem? An allergic reaction could occur after the vaccinated person leaves the clinic. If you see signs of a severe allergic reaction (hives, swelling of the face and throat, difficulty breathing, a fast heartbeat, dizziness, or weakness), call 9-1-1 and get the person to the nearest hospital. 
 
For other signs that concern you, call your health care provider. Adverse reactions should be reported to the Vaccine Adverse Event Reporting System (VAERS). Your health care provider will usually file this report, or you can do it yourself. Visit the VAERS website at www.vaers. hhs.gov or call 5-101-244-023-707-2271. VAERS is only for reporting reactions, and [facility] staff do not give medical advice. 6. The National Vaccine Injury Compensation Program 
 
The [facility] Vaccine Injury Compensation Program (VICP) is a federal program that was created to compensate people who may have been injured by certain vaccines. Visit the VICP website at www.hrsa.gov/vaccinecompensation or call 7-558.265.5792 to learn about the program and about filing a claim. There is a time limit to file a claim for compensation. 7. How can I learn more?  Ask your health care provider.  Call your local or state health department.  Contact the Centers for Disease Control and Prevention (CDC): 
- Call 8-944.635.7187 (1-800-CDC-INFO) or 
- Visit CDCs influenza website at www.cdc.gov/flu Vaccine Information Statement (Interim) Inactivated Influenza Vaccine 8/15/2019 
42 SHALA Facundo Da Silva [id number] Department of [facility] and [employer] Centers for Disease Control and Prevention Office Use Only

## 2021-06-03 NOTE — PROGRESS NOTES
This is a \"Welcome to [employer]"  Initial Preventive Physical Examination (IPPE) providing Personalized Prevention Plan Services (Performed in the first 12 months of enrollment)    I have reviewed the patient's medical history in detail and updated the computerized patient record. Assessment/Plan   Education and counseling provided:  Are appropriate based on today's review and evaluation  End-of-Life planning (with patient's consent)    1. Welcome to Medicare preventive visit  2. Type 2 diabetes mellitus with stage 3a chronic kidney disease, with long-term current use of insulin (HCC)  -     AMB POC HEMOGLOBIN A1C  -     HEMOGLOBIN A1C W/O EAG; Future  3. Essential hypertension  -     METABOLIC PANEL, COMPREHENSIVE; Future  4. Hypercholesterolemia  -     LIPID PANEL; Future  5. Mild persistent asthma without complication  -     fluticasone propionate (Flovent HFA) 44 mcg/actuation inhaler; USE 2 INHALATIONS ORALLY   TWICE DAILY, Normal, Disp-3 Inhaler, R-3  6. Morbid obesity ([address]  7. Vitamin D deficiency  -     VITAMIN D, 25 HYDROXY; Future  8. Encounter for immunization  -     ADMIN INFLUENZA VIRUS VAC  -     PNEUMOCOCCAL POLYSACCHARIDE VACCINE, 23-VALENT, ADULT OR IMMUNOSUPPRESSED PT DOSE,       Depression Risk Screen     3 most recent PHQ Screens 6/3/2021   Little interest or pleasure in doing things Not at all   Feeling down, depressed, irritable, or hopeless Not at all   Total Score PHQ 2 0       Alcohol Risk Screen    Do you average more than 1 drink per night or more than 7 drinks a week:  No    On any one occasion in the past three months have you have had more than 3 drinks containing alcohol:  No          Functional Ability and Level of Safety    Diet: No special diet      Hearing: Hearing is good. Vision Screening:  Vision is good. No exam data present      Activities of Daily Living: The home contains: no safety equipment.   Patient does total self care      Ambulation: with no difficulty Exercise level: sedentary     Fall Risk Screen:  Fall Risk Assessment, last 12 mths 6/3/2021   Able to walk? Yes   Fall in past 12 months? 0   Do you feel unsteady? 0   Are you worried about falling 0      Abuse Screen:  Patient is not abused       Screening EKG   EKG order placed: No    End of Life Planning   Advanced care planning directives were discussed with the patient and /or family/caregiver. Health Maintenance Due     Health Maintenance Due   Topic Date Due    COVID-19 Vaccine (1) Never done    DTaP/Tdap/Td series (1 - Tdap) Never done    Shingrix Vaccine Age 50> (1 of 2) Never done    Foot Exam Q1  12/28/2018    Bone Densitometry (Dexa) Screening  Never done       Patient Care Team   Patient Care Team:  Will Man MD as PCP - General (Internal Medicine)  Will Man MD as PCP - [address] [place] [address]  [facility] Provider  Kellie Steward MD (Ophthalmology)  Dara Escalona MD (Ophthalmology)  Bin Ma DPM (Podiatry)    Glaucoma Screening-  UTD  Pneumonia Vaccine-Pneumovax 23 given today  Shingles Vaccine- pt aware   Tdap Vaccine- not UTD  Colonoscopy-  9/2020 Dr Ruba Moreira f/u 7 yrs  Mammogram 11/2020  Advance Directive-  Does not have one     History     Past Medical History:   Diagnosis Date    CTS (carpal tunnel syndrome)     DJD (degenerative joint disease) of knee     Left Knee    Glaucoma     Hand paresthesia 4/7/2010    Hypercholesterolemia     Hypertension     IDDM     Followed by Dr. Marlen Jamison       Past Surgical History:   Procedure Laterality Date    ENDOSCOPY, COLON, DIAGNOSTIC  11-    normal ;      Current Outpatient Medications   Medication Sig Dispense Refill    fluticasone propionate (Flovent HFA) 44 mcg/actuation inhaler USE 2 INHALATIONS ORALLY   TWICE DAILY 3 Inhaler 3    levomefolate-B6-meB12-algal oil (Metanx, algal oil,) 3 mg-35 mg-2 mg -90.314 mg cap Take 1 Capsule by mouth daily.  90 Capsule 3    lisinopril-hydroCHLOROthiazide (PRINZIDE, ZESTORETIC) 10-12.5 mg per tablet TAKE 1 TABELT BY MOUTH TWICE DAILY 180 Tab 0    insulin NPH/insulin regular (NovoLIN 70/30 U-100 Insulin) 100 unit/mL (70-30) injection INJECT 50 UNITS            SUBCUTANEOUSLY TWO TIMES A DAY 70 mL 0    albuterol (PROAIR HFA) 90 mcg/actuation inhaler Take 2 Puffs by inhalation every four (4) hours as needed for Wheezing. 3 Inhaler 3    azelastine (ASTELIN) 137 mcg (0.1 %) nasal spray 2 Sprays by Both Nostrils route two (2) times a day. 1 Bottle 5    cpap machine kit by Does Not Apply route. 1  Overnight CPAP at 11 CWP with ramp humidifier. 2  Mask: Miranda Irving or mask of choice. Supplies 99 month. Please send compliance data [id number]. Diagnosis VIKKI. AHI 17 RDI 18 1 Kit 0    COMBIGAN 0.2-0.5 % drop ophthalmic solution       Calcium Carbonate-Vit D3-Min (CALTRATE 600+D PLUS MINERALS) 600 mg (1,500 mg)-400 unit Chew Take 1 Tab by mouth daily.  aspirin 81 mg Tab Take  by mouth daily. Allergies   Allergen Reactions    Crestor [Rosuvastatin] Myalgia       Family History   Problem Relation Age of Onset    Kidney Disease Mother     Hypertension Mother     Diabetes Mother     Colon Polyps Mother     Heart Attack Mother     Cancer Father     Diabetes Sister     Diabetes Brother     Hypertension Maternal Aunt     Diabetes Maternal Uncle     Heart Disease Maternal Uncle     Diabetes Sister     Diabetes Brother     Diabetes Brother      Social History     Tobacco Use    Smoking status: Never Smoker    Smokeless tobacco: Never Used   Substance Use Topics    Alcohol use: No     A comprehensive 5 year plan for medical care and screening exams was reviewed with pt and they received a copy of it.         Ike Covarrubias LPN

## 2021-06-03 NOTE — PROGRESS NOTES
Subjective:       Chief Complaint  The patient presents for follow up of diabetes, hypertension and high cholesterol. HPI  Kody Massey is a 72 y.o. female seen for follow up of diabetes. Shealso has hypertension and hyperlipidemia. Diabetes poorly controlled, on novolog 70/30  Insulin 50 units QAM and 50 units QPM, pt no longer on  Victoza 1.8 pt and is no longer seeing Endo(Dr Schuster),  Endo had recommended an insulin pump but pt was reluctant to do it, patient now has developed chronic kidney disease stage III , hypertension well controlled, no significant medication side effects noted, on Zestoretic, hyperlipidemia poorly controlled, off crestor which she stopped due tp leg cramps. She may benefit from [address] [place] [address] in the future. Pt has sleep apnea but does not use CPAP machine. Diet and Lifestyle: not attempting to follow a low fat, low cholesterol diet, does not rigorously follow a diabetic diet, exercises sporadically    Home BP Monitoring: is not measured at home. Diabetic Review of Systems - home glucose monitoring: is performed regularly, 3x/day. Other symptoms and concerns: pt needs to work on losing weight to improve her overall health her BMI of 41 puts her at increased risk of multiple medical problems    Vit D deficiency is well controlled on current supplementation. Pt continues to have knee pain due to arthritis. Asthma is well controlled on Flovent      Current Outpatient Medications   Medication Sig    fluticasone propionate (Flovent HFA) 44 mcg/actuation inhaler USE 2 INHALATIONS ORALLY   TWICE DAILY    levomefolate-B6-meB12-algal oil (Metanx, algal oil,) 3 mg-35 mg-2 mg -90.314 mg cap Take 1 Capsule by mouth daily.     lisinopril-hydroCHLOROthiazide (PRINZIDE, ZESTORETIC) 10-12.5 mg per tablet TAKE 1 TABELT BY MOUTH TWICE DAILY    insulin NPH/insulin regular (NovoLIN 70/30 U-100 Insulin) 100 unit/mL (70-30) injection INJECT 50 UNITS            SUBCUTANEOUSLY TWO TIMES A DAY    albuterol (PROAIR HFA) 90 mcg/actuation inhaler Take 2 Puffs by inhalation every four (4) hours as needed for Wheezing.  azelastine (ASTELIN) 137 mcg (0.1 %) nasal spray 2 Sprays by Both Nostrils route two (2) times a day.  cpap machine kit by Does Not Apply route. 1  Overnight CPAP at 11 CWP with ramp humidifier. 2  Mask: Silva and Pykel Aristides or mask of choice. Supplies 99 month. Please send compliance data [id number]. Diagnosis VIKKI. AHI 17 RDI 18    COMBIGAN 0.2-0.5 % drop ophthalmic solution     Calcium Carbonate-Vit D3-Min (CALTRATE 600+D PLUS MINERALS) 600 mg (1,500 mg)-400 unit Chew Take 1 Tab by mouth daily.  aspirin 81 mg Tab Take  by mouth daily. No current facility-administered medications for this visit.              Review of Systems  Respiratory: negative for dyspnea on exertion  Cardiovascular: negative for chest pain    Objective:     Visit Vitals  BP (!) 127/59 (BP 1 Location: Left arm, BP Patient Position: Sitting, BP Cuff Size: Adult)   Pulse 75   Temp 97.7 °F (36.5 °C) (Temporal)   Resp 16   Ht 5' 5\" (1.651 m)   Wt 250 lb (113.4 kg)   SpO2 99%   BMI 41.60 kg/m²        General appearance - alert, well appearing, and in no distress  Chest - clear to auscultation, no wheezes, rales or rhonchi, symmetric air entry  Heart - normal rate, regular rhythm, normal S1, S2, no murmurs, rubs, clicks or gallops  Extremities - peripheral pulses normal, no pedal edema, no clubbing or cyanosis      Labs:   Lab Results   Component Value Date/Time    Hemoglobin A1c 7.5 (H) 02/24/2020 09:12 AM    Hemoglobin A1c 7.6 (H) 10/25/2019 08:20 AM    Hemoglobin A1c 9.3 (H) 06/06/2018 08:34 AM    Hemoglobin A1c, External 8.8 01/08/2019 12:00 AM    Hemoglobin A1c, External 10.1 % 05/23/2017 12:00 AM    Hemoglobin A1c, External 10.3 11/03/2016 12:00 AM    Glucose 79 05/27/2021 08:59 AM    Microalbumin/Creat ratio (mg/g creat) 28 10/25/2019 08:20 AM    Microalb/Creat ratio (ug/mg creat.) 10 05/27/2021 08:59 AM    Microalbumin,urine random 3.25 (H) 10/25/2019 08:20 AM    LDL, calculated 160 (H) 05/27/2021 08:59 AM    LDL, calculated 143.6 (H) 02/24/2020 09:12 AM    Creatinine, POC 1.5 (H) 01/07/2015 09:31 AM    Creatinine 1.27 (H) 05/27/2021 08:59 AM      Lab Results   Component Value Date/Time    Cholesterol, total 252 (H) 05/27/2021 08:59 AM    HDL Cholesterol 76 05/27/2021 08:59 AM    LDL, calculated 160 (H) 05/27/2021 08:59 AM    LDL, calculated 143.6 (H) 02/24/2020 09:12 AM    Triglyceride 96 05/27/2021 08:59 AM    CHOL/HDL Ratio 3.6 02/24/2020 09:12 AM       Lab Results   Component Value Date/Time    Sodium 142 05/27/2021 08:59 AM    Potassium 4.9 05/27/2021 08:59 AM    Chloride 103 05/27/2021 08:59 AM    CO2 22 05/27/2021 08:59 AM    Anion gap 6 02/24/2020 09:12 AM    Glucose 79 05/27/2021 08:59 AM    BUN 26 05/27/2021 08:59 AM    Creatinine 1.27 (H) 05/27/2021 08:59 AM    BUN/Creatinine ratio 20 05/27/2021 08:59 AM    GFR est AA 51 (L) 05/27/2021 08:59 AM    GFR est non-AA 44 (L) 05/27/2021 08:59 AM    Calcium 9.8 05/27/2021 08:59 AM    Bilirubin, total <0.2 05/27/2021 08:59 AM    ALT (SGPT) 15 05/27/2021 08:59 AM    Alk. phosphatase 135 (H) 05/27/2021 08:59 AM    Protein, total 7.3 05/27/2021 08:59 AM    Albumin 4.5 05/27/2021 08:59 AM    Globulin 3.8 02/24/2020 09:12 AM    A-G Ratio 1.6 05/27/2021 08:59 AM        Labs:   Lab Results   Component Value Date/Time    Hemoglobin A1c 7.5 (H) 02/24/2020 09:12 AM    Hemoglobin A1c (POC) 8.2 06/03/2021 10:38 AM    Hemoglobin A1c, External 8.8 01/08/2019 12:00 AM            Assessment / Plan     Diabetes uncontrolled on Novolin 70/30 50 units twice daily. Patient no longer seen endocrine and had declined insulin pump. Consider adding GLP-1  Hypertension well controlled, on Zestoretic. Hyperlipidemia patient unable to tolerate statins due to myalgias. She will conisder doing Heart Scan to see if Repatha is an option. ICD-10-CM ICD-9-CM    1. Welcome to Medicare preventive visit  Z00.00 V70.0    2. Type 2 diabetes mellitus with stage 3a chronic kidney disease, with long-term current use of insulin (HCC)  E11.22 250.40 AMB POC HEMOGLOBIN A1C    N18.31 585.3 HEMOGLOBIN A1C W/O EAG    Z79.4 V58.67    3. Essential hypertension  G90 041.4 METABOLIC PANEL, COMPREHENSIVE   4. Hypercholesterolemia  E78.00 272.0 LIPID PANEL   5. Mild persistent asthma without complication  J97.61 702.64  controlled on fluticasone propionate (Flovent HFA) 44 mcg/actuation inhaler   6. Morbid obesity ([address]  E66.01 278.01  patient will continue to work on cutting back starches and sweets and decreasing meal frequency   7. Vitamin D deficiency  E55.9 268.9  well-controlled on current supplementation VITAMIN D, 25 HYDROXY   8. Encounter for immunization  Z23 V03.89 ADMIN INFLUENZA VIRUS VAC      PNEUMOCOCCAL POLYSACCHARIDE VACCINE, 23-VALENT, ADULT OR IMMUNOSUPPRESSED PT DOSE,                Diabetic issues reviewed with her: diabetic diet discussed in detail, and low cholesterol diet, weight control and daily exercise discussed. Follow-up and Dispositions    · Return in about 4 months (around 10/3/2021) for labs 1 week before. Reviewed plan of care. Patient has provided input and agrees with goals.

## 2021-09-30 ENCOUNTER — APPOINTMENT (OUTPATIENT)
Dept: INTERNAL MEDICINE CLINIC | Age: 66
End: 2021-09-30

## 2021-10-01 LAB
25(OH)D3+25(OH)D2 SERPL-MCNC: 35.1 NG/ML (ref 30–100)
ALBUMIN SERPL-MCNC: 4.9 G/DL (ref 3.8–4.8)
ALBUMIN/GLOB SERPL: 1.4 {RATIO} (ref 1.2–2.2)
ALP SERPL-CCNC: 151 IU/L (ref 44–121)
ALT SERPL-CCNC: 20 IU/L (ref 0–32)
AST SERPL-CCNC: 21 IU/L (ref 0–40)
BILIRUB SERPL-MCNC: 0.2 MG/DL (ref 0–1.2)
BUN SERPL-MCNC: 23 MG/DL (ref 8–27)
BUN/CREAT SERPL: 20 (ref 12–28)
CALCIUM SERPL-MCNC: 10.3 MG/DL (ref 8.7–10.3)
CHLORIDE SERPL-SCNC: 103 MMOL/L (ref 96–106)
CHOLEST SERPL-MCNC: 281 MG/DL (ref 100–199)
CO2 SERPL-SCNC: 23 MMOL/L (ref 20–29)
CREAT SERPL-MCNC: 1.15 MG/DL (ref 0.57–1)
GLOBULIN SER CALC-MCNC: 3.5 G/DL (ref 1.5–4.5)
GLUCOSE SERPL-MCNC: 55 MG/DL (ref 65–99)
HBA1C MFR BLD: 8.5 % (ref 4.8–5.6)
HDLC SERPL-MCNC: 82 MG/DL
IMP & REVIEW OF LAB RESULTS: NORMAL
INTERPRETATION: NORMAL
LDLC SERPL CALC-MCNC: 184 MG/DL (ref 0–99)
POTASSIUM SERPL-SCNC: 4.3 MMOL/L (ref 3.5–5.2)
PROT SERPL-MCNC: 8.4 G/DL (ref 6–8.5)
SODIUM SERPL-SCNC: 146 MMOL/L (ref 134–144)
TRIGL SERPL-MCNC: 91 MG/DL (ref 0–149)
VLDLC SERPL CALC-MCNC: 15 MG/DL (ref 5–40)

## 2021-10-04 ENCOUNTER — TELEPHONE (OUTPATIENT)
Dept: PHARMACY | Age: 66
End: 2021-10-04

## 2021-10-04 NOTE — TELEPHONE ENCOUNTER
Ursula Cogan, MD, from below, appt with you 10/7/21  - Chart reviewed d/t insurer-identified gap: diabetes rx claims without a statin rx claim. 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 Thank you,  Prabha Vasquez, PharmD, [address] [facility], toll free: [phone], option 2  ==============================================================  [facility]: STATIN THERAPY REVIEW  Identified statin use in persons with diabetes care gap per [facility] Records dated: 9/19/21. Last Visit: 6/3/21    Patient also appears to be prescribed: ACEi, DM    Patient not found in Outcomes MTM    ASSESSMENT  ACE/ARB ADHERENCE  Per Insurance Records through 9/19/21 (NOEL Kerr = 100%; Potential Fail Date: 12/29/21):   Lisinopril-HCTZ 10-12.5mg. Next refill due: 11/14/21    BP Readings from Last 3 Encounters:   06/03/21 (!) 127/59   03/02/20 140/80   11/01/19 130/74     Estimated Creatinine Clearance: 60.5 mL/min (A) (by C-G formula based on SCr of 1.15 mg/dL (H)). DIABETES ADHERENCE  Per Insurance Records through 9/19/21: not reported (insulin)    Lab Results   Component Value Date/Time    Hemoglobin A1c 8.5 (H) 09/30/2021 12:00 AM    Hemoglobin A1c (POC) 8.2 06/03/2021 10:38 AM     STATIN GAP IDENTIFIED    Per chart review:   Allergies   Allergen Reactions    Crestor [Rosuvastatin] Myalgia   - Per med list med notes & dc reasons, appears also with ADRs to atorvastatin and per 10/30/12 OV note, possibly prev ADR with simvastatin too  - Appears may have taken fluvastatin XL 80mg daily and pravastatin 80mg daily without ADR, but therapy was changed to try higher dose/potency statin  - Per 6/3/21 PCP visit note: \"unable to tolerate statins due to myalgias. She will conisder doing Heart Scan to see if Repatha is an option. \"    Lab Results   Component Value Date/Time    Cholesterol, total 281 (H) 09/30/2021 12:00 AM    HDL Cholesterol 82 09/30/2021 12:00 AM    LDL, calculated 184 (H) 09/30/2021 12:00 AM    VLDL, calculated 15 09/30/2021 12:00 AM    Triglyceride 91 09/30/2021 12:00 AM     ALT (SGPT)   Date Value Ref Range Status   09/30/2021 20 0 - 32 IU/L Final     AST (SGOT)   Date Value Ref Range Status   09/30/2021 21 0 - 40 IU/L Final     The 10-year ASCVD risk score (Tita Hernandez et al., 2013) is: 26.5%    Values used to calculate the score:      Age: 77 years      Sex: Female      Is Non- : Yes      Diabetic: Yes      Tobacco smoker: No      Systolic Blood Pressure: 180 mmHg      Is BP treated: Yes      HDL Cholesterol: 82 mg/dL      Total Cholesterol: 281 mg/dL     Future Appointments   Date Time Provider Shahana Becker   10/7/2021  9:45 AM Camille Encarnacion MD [facility] BS AMB

## 2021-10-11 NOTE — TELEPHONE ENCOUNTER
Sandeep Rand MD, from below  - Chart reviewed d/t insurer-identified gap: diabetes rx claims without a statin rx claim. ? 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     Thank you,  Bren Stauffer, PharmD, [address] [facility]  Department, toll free: [phone], option 2  ==============================================================    Noted it appears patient no show for 10/7 appt; will close encounter at this time.       For Pharmacy Admin Tracking Only   Gap Closed?: No   Time Spent (min): 15

## 2021-11-03 NOTE — TELEPHONE ENCOUNTER
oJshua Gale MD, from below, appt with you 11/5/21  - Chart reviewed d/t insurer-identified gap: diabetes rx claims without a statin rx claim.   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     Thank you,  Gilmar Stauffer, PharmD, [address] [facility], toll free: [phone], option 2

## 2021-11-05 ENCOUNTER — OFFICE VISIT (OUTPATIENT)
Dept: INTERNAL MEDICINE CLINIC | Age: 66
End: 2021-11-05
Payer: MEDICARE

## 2021-11-05 VITALS
HEART RATE: 84 BPM | DIASTOLIC BLOOD PRESSURE: 55 MMHG | OXYGEN SATURATION: 97 % | WEIGHT: 249 LBS | SYSTOLIC BLOOD PRESSURE: 129 MMHG | TEMPERATURE: 97.8 F | RESPIRATION RATE: 20 BRPM | HEIGHT: 65 IN | BODY MASS INDEX: 41.48 KG/M2

## 2021-11-05 DIAGNOSIS — J45.30 MILD PERSISTENT ASTHMA WITHOUT COMPLICATION: ICD-10-CM

## 2021-11-05 DIAGNOSIS — E11.22 TYPE 2 DIABETES MELLITUS WITH STAGE 3A CHRONIC KIDNEY DISEASE, WITH LONG-TERM CURRENT USE OF INSULIN (HCC): Primary | ICD-10-CM

## 2021-11-05 DIAGNOSIS — I10 ESSENTIAL HYPERTENSION: ICD-10-CM

## 2021-11-05 DIAGNOSIS — M79.10 MYALGIA DUE TO STATIN: ICD-10-CM

## 2021-11-05 DIAGNOSIS — N18.31 TYPE 2 DIABETES MELLITUS WITH STAGE 3A CHRONIC KIDNEY DISEASE, WITH LONG-TERM CURRENT USE OF INSULIN (HCC): Primary | ICD-10-CM

## 2021-11-05 DIAGNOSIS — Z23 NEEDS FLU SHOT: ICD-10-CM

## 2021-11-05 DIAGNOSIS — E66.01 MORBID OBESITY (HCC): ICD-10-CM

## 2021-11-05 DIAGNOSIS — L97.919 DIABETIC ULCER OF RIGHT LOWER LEG (HCC): ICD-10-CM

## 2021-11-05 DIAGNOSIS — E11.622 DIABETIC ULCER OF RIGHT LOWER LEG (HCC): ICD-10-CM

## 2021-11-05 DIAGNOSIS — E78.00 HYPERCHOLESTEROLEMIA: ICD-10-CM

## 2021-11-05 DIAGNOSIS — Z79.4 TYPE 2 DIABETES MELLITUS WITH STAGE 3A CHRONIC KIDNEY DISEASE, WITH LONG-TERM CURRENT USE OF INSULIN (HCC): Primary | ICD-10-CM

## 2021-11-05 DIAGNOSIS — T46.6X5A MYALGIA DUE TO STATIN: ICD-10-CM

## 2021-11-05 DIAGNOSIS — E55.9 VITAMIN D DEFICIENCY: ICD-10-CM

## 2021-11-05 PROCEDURE — 99214 OFFICE O/P EST MOD 30 MIN: CPT | Performed by: INTERNAL MEDICINE

## 2021-11-05 PROCEDURE — G0008 ADMIN INFLUENZA VIRUS VAC: HCPCS | Performed by: INTERNAL MEDICINE

## 2021-11-05 PROCEDURE — 90694 VACC AIIV4 NO PRSRV 0.5ML IM: CPT | Performed by: INTERNAL MEDICINE

## 2021-11-05 PROCEDURE — G8427 DOCREV CUR MEDS BY ELIG CLIN: HCPCS | Performed by: INTERNAL MEDICINE

## 2021-11-05 PROCEDURE — 1090F PRES/ABSN URINE INCON ASSESS: CPT | Performed by: INTERNAL MEDICINE

## 2021-11-05 PROCEDURE — G8536 NO DOC ELDER MAL SCRN: HCPCS | Performed by: INTERNAL MEDICINE

## 2021-11-05 PROCEDURE — 2022F DILAT RTA XM EVC RTNOPTHY: CPT | Performed by: INTERNAL MEDICINE

## 2021-11-05 PROCEDURE — 3052F HG A1C>EQUAL 8.0%<EQUAL 9.0%: CPT | Performed by: INTERNAL MEDICINE

## 2021-11-05 PROCEDURE — G9899 SCRN MAM PERF RSLTS DOC: HCPCS | Performed by: INTERNAL MEDICINE

## 2021-11-05 PROCEDURE — G8400 PT W/DXA NO RESULTS DOC: HCPCS | Performed by: INTERNAL MEDICINE

## 2021-11-05 PROCEDURE — G8510 SCR DEP NEG, NO PLAN REQD: HCPCS | Performed by: INTERNAL MEDICINE

## 2021-11-05 PROCEDURE — G8417 CALC BMI ABV UP PARAM F/U: HCPCS | Performed by: INTERNAL MEDICINE

## 2021-11-05 PROCEDURE — 1101F PT FALLS ASSESS-DOCD LE1/YR: CPT | Performed by: INTERNAL MEDICINE

## 2021-11-05 PROCEDURE — 3017F COLORECTAL CA SCREEN DOC REV: CPT | Performed by: INTERNAL MEDICINE

## 2021-11-05 PROCEDURE — G8754 DIAS BP LESS 90: HCPCS | Performed by: INTERNAL MEDICINE

## 2021-11-05 PROCEDURE — G8752 SYS BP LESS 140: HCPCS | Performed by: INTERNAL MEDICINE

## 2021-11-05 RX ORDER — MUPIROCIN 20 MG/G
OINTMENT TOPICAL DAILY
Qty: 22 G | Refills: 0 | Status: SHIPPED | OUTPATIENT
Start: 2021-11-05

## 2021-11-05 RX ORDER — CEPHALEXIN 500 MG/1
500 CAPSULE ORAL 4 TIMES DAILY
Qty: 28 CAPSULE | Refills: 0 | Status: SHIPPED | OUTPATIENT
Start: 2021-11-05 | End: 2021-11-12

## 2021-11-05 NOTE — PROGRESS NOTES
Patient is in the office today for 5 month follow up. Patient has wound on right lower leg. Do you have an Advance Directive no  Do you want more information : information given    1. \"Have you been to the ER, urgent care clinic since your last visit? Hospitalized since your last visit? \" No    2. \"Have you seen or consulted any other health care providers outside of the [address] [place] [address] since your last visit? \" No     3. For patients aged 39-70: Has the patient had a colonoscopy? No     If the patient is female:    4. For patients aged 41-77: Has the patient had a mammogram within the past 2 years? No    5. For patients aged 21-65: Has the patient had a pap smear? No      Verbal order read back per Dr. Ling Mccord flu vaccine. Patient received flu vaccine in left deltoid. Patient was observed and no signs or symptoms of an allergic reaction noted at this time. Patient tolerated well and left without complaints. Patient received flu VIS.

## 2021-11-05 NOTE — PATIENT INSTRUCTIONS
High Blood Pressure: Care Instructions  Overview     It's normal for blood pressure to go up and down throughout the day. But if it stays up, you have high blood pressure. Another name for high blood pressure is hypertension. Despite what a lot of people think, high blood pressure usually doesn't cause headaches or make you feel dizzy or lightheaded. It usually has no symptoms. But it does increase your risk of stroke, heart attack, and other problems. You and your doctor will talk about your risks of these problems based on your blood pressure. Your doctor will give you a goal for your blood pressure. Your goal will be based on your health and your age. Lifestyle changes, such as eating healthy and being active, are always important to help lower blood pressure. You might also take medicine to reach your blood pressure goal.  Follow-up care is a key part of your treatment and safety. Be sure to make and go to all appointments, and call your doctor if you are having problems. It's also a good idea to know your test results and keep a list of the medicines you take. How can you care for yourself at home? Medical treatment  · If you stop taking your medicine, your blood pressure will go back up. You may take one or more types of medicine to lower your blood pressure. Be safe with medicines. Take your medicine exactly as prescribed. Call your doctor if you think you are having a problem with your medicine. · Talk to your doctor before you start taking aspirin every day. Aspirin can help certain people lower their risk of a heart attack or stroke. But taking aspirin isn't right for everyone, because it can cause serious bleeding. · See your doctor regularly. You may need to see the doctor more often at first or until your blood pressure comes down. · If you are taking blood pressure medicine, talk to your doctor before you take decongestants or anti-inflammatory medicine, such as ibuprofen.  Some of these medicines can raise blood pressure. · Learn how to check your blood pressure at home. Lifestyle changes  · Stay at a healthy weight. This is especially important if you put on weight around the waist. Losing even 10 pounds can help you lower your blood pressure. · If your doctor recommends it, get more exercise. Walking is a good choice. Bit by bit, increase the amount you walk every day. Try for at least 30 minutes on most days of the week. You also may want to swim, bike, or do other activities. · Avoid or limit alcohol. Talk to your doctor about whether you can drink any alcohol. · Try to limit how much sodium you eat to less than 2,300 milligrams (mg) a day. Your doctor may ask you to try to eat less than 1,500 mg a day. · Eat plenty of fruits (such as bananas and oranges), vegetables, legumes, whole grains, and low-fat dairy products. · Lower the amount of saturated fat in your diet. Saturated fat is found in animal products such as milk, cheese, and meat. Limiting these foods may help you lose weight and also lower your risk for heart disease. · Do not smoke. Smoking increases your risk for heart attack and stroke. If you need help quitting, talk to your doctor about stop-smoking programs and medicines. These can increase your chances of quitting for good. When should you call for help? Call 911  anytime you think you may need emergency care. This may mean having symptoms that suggest that your blood pressure is causing a serious heart or blood vessel problem. Your blood pressure may be over 180/120. For example, call 911 if:    · You have symptoms of a heart attack. These may include:  ? Chest pain or pressure, or a strange feeling in the chest.  ? Sweating. ? Shortness of breath. ? Nausea or vomiting. ? Pain, pressure, or a strange feeling in the back, neck, jaw, or upper belly or in one or both shoulders or arms. ? Lightheadedness or sudden weakness.   ? A fast or irregular heartbeat.     · You have symptoms of a stroke. These may include:  ? Sudden numbness, tingling, weakness, or loss of movement in your face, arm, or leg, especially on only one side of your body. ? Sudden vision changes. ? Sudden trouble speaking. ? Sudden confusion or trouble understanding simple statements. ? Sudden problems with walking or balance. ? A sudden, severe headache that is different from past headaches.     · You have severe back or belly pain. Do not wait until your blood pressure comes down on its own. Get help right away. Call your doctor now or seek immediate care if:    · Your blood pressure is much higher than normal (such as 180/120 or higher), but you don't have symptoms.     · You think high blood pressure is causing symptoms, such as:  ? Severe headache.  ? Blurry vision. Watch closely for changes in your health, and be sure to contact your doctor if:    · Your blood pressure measures higher than your doctor recommends at least 2 times. That means the top number is higher or the bottom number is higher, or both.     · You think you may be having side effects from your blood pressure medicine. Where can you learn more? Go to http://www.gray.com/  Enter [id number] in the search box to learn more about \"High Blood Pressure: Care Instructions. \"  Current as of: April 29, 2021               Content Version: 13.0  © 2006-2021 [employer]. Care instructions adapted under license by [employer] (which disclaims liability or warranty for this information). If you have questions about a medical condition or this instruction, always ask your healthcare professional. [place] [address] any warranty or liability for your use of this information.

## 2021-11-10 NOTE — PROGRESS NOTES
Subjective:       Chief Complaint  The patient presents for follow up of diabetes, hypertension and high cholesterol. KRIS Beaulieu is a 77 y.o. female seen for follow up of diabetes. Shealso has hypertension and hyperlipidemia. Diabetes poorly controlled, on novolog 70/30  Insulin 50 units QAM and 50 units QPM, pt no longer on  Victoza 1.8 pt and is no longer seeing Endo(Dr Schuster),  Endo had recommended an insulin pump but pt was reluctant to do it, patient could not afford the GLP-1. Patient now has developed chronic kidney disease stage III , hypertension well controlled, no significant medication side effects noted, on Zestoretic, hyperlipidemia poorly controlled, off crestor which she stopped due to leg cramps. She may benefit from Isela Pean in the future. Pt has sleep apnea but does not use CPAP machine. Diet and Lifestyle: not attempting to follow a low fat, low cholesterol diet, does not rigorously follow a diabetic diet, exercises sporadically    Home BP Monitoring: is not measured at home. Diabetic Review of Systems - home glucose monitoring: is performed regularly, 3x/day. Other symptoms and concerns: pt needs to work on losing weight to improve her overall health her BMI of 41 puts her at increased risk of multiple medical problems    Vit D deficiency is well controlled on current supplementation. Pt continues to have knee pain due to arthritis. Asthma is well controlled on Flovent     Patient has developed a superficial right lower leg diabetic foot ulcer. She has an appointment to see podiatry in the next week    Current Outpatient Medications   Medication Sig    cephALEXin (KEFLEX) 500 mg capsule Take 1 Capsule by mouth four (4) times daily for 7 days.  mupirocin (BACTROBAN) 2 % ointment Apply  to affected area daily.     lisinopril-hydroCHLOROthiazide (PRINZIDE, ZESTORETIC) 10-12.5 mg per tablet TAKE 1 TABLET TWICE DAILY    insulin NPH/insulin regular (NovoLIN 70/30 U-100 Insulin) 100 unit/mL (70-30) injection INJECT 50 UNITS SUBCUTANEOUSLY TWO TIMES A DAY    fluticasone propionate (Flovent HFA) 44 mcg/actuation inhaler USE 2 INHALATIONS ORALLY   TWICE DAILY    albuterol (PROAIR HFA) 90 mcg/actuation inhaler Take 2 Puffs by inhalation every four (4) hours as needed for Wheezing.  azelastine (ASTELIN) 137 mcg (0.1 %) nasal spray 2 Sprays by Both Nostrils route two (2) times a day.  cpap machine kit by Does Not Apply route. 1  Overnight CPAP at 11 CWP with ramp humidifier. 2  Mask: Silva and Pykel Aristides or mask of choice. Supplies 99 month. Please send compliance data [id number]. Diagnosis VIKKI. AHI 17 RDI 18    COMBIGAN 0.2-0.5 % drop ophthalmic solution     Calcium Carbonate-Vit D3-Min (CALTRATE 600+D PLUS MINERALS) 600 mg (1,500 mg)-400 unit Chew Take 1 Tab by mouth daily.  aspirin 81 mg Tab Take  by mouth daily.  levomefolate-B6-meB12-algal oil (Metanx, algal oil,) 3 mg-35 mg-2 mg -90.314 mg cap Take 1 Capsule by mouth daily. (Patient not taking: Reported on 11/5/2021)     No current facility-administered medications for this visit. Review of Systems  Respiratory: negative for dyspnea on exertion  Cardiovascular: negative for chest pain    Objective:     Visit Vitals  BP (!) 129/55 (BP 1 Location: Left arm, BP Patient Position: Sitting, BP Cuff Size: Adult)   Pulse 84   Temp 97.8 °F (36.6 °C) (Temporal)   Resp 20   Ht 5' 5\" (1.651 m)   Wt 249 lb (112.9 kg)   SpO2 97%   BMI 41.44 kg/m²        General appearance - alert, well appearing, and in no distress  Chest - clear to auscultation, no wheezes, rales or rhonchi, symmetric air entry  Heart - normal rate, regular rhythm, normal S1, S2, no murmurs, rubs, clicks or gallops  Extremities - peripheral pulses normal, no pedal edema, no clubbing or cyanosis  Skin-about dime size superficial ulcer mid lower leg on the medial side.     Labs:   Lab Results   Component Value Date/Time Hemoglobin A1c 8.5 (H) 09/30/2021 12:00 AM    Hemoglobin A1c 7.5 (H) 02/24/2020 09:12 AM    Hemoglobin A1c 7.6 (H) 10/25/2019 08:20 AM    Hemoglobin A1c, External 8.8 01/08/2019 12:00 AM    Hemoglobin A1c, External 10.1 % 05/23/2017 12:00 AM    Hemoglobin A1c, External 10.3 11/03/2016 12:00 AM    Glucose 55 (L) 09/30/2021 12:00 AM    Microalbumin/Creat ratio (mg/g creat) 28 10/25/2019 08:20 AM    Microalb/Creat ratio (ug/mg creat.) 10 05/27/2021 08:59 AM    Microalbumin,urine random 3.25 (H) 10/25/2019 08:20 AM    LDL, calculated 184 (H) 09/30/2021 12:00 AM    LDL, calculated 143.6 (H) 02/24/2020 09:12 AM    Creatinine, POC 1.5 (H) 01/07/2015 09:31 AM    Creatinine 1.15 (H) 09/30/2021 12:00 AM      Lab Results   Component Value Date/Time    Cholesterol, total 281 (H) 09/30/2021 12:00 AM    HDL Cholesterol 82 09/30/2021 12:00 AM    LDL, calculated 184 (H) 09/30/2021 12:00 AM    LDL, calculated 143.6 (H) 02/24/2020 09:12 AM    Triglyceride 91 09/30/2021 12:00 AM    CHOL/HDL Ratio 3.6 02/24/2020 09:12 AM       Lab Results   Component Value Date/Time    Sodium 146 (H) 09/30/2021 12:00 AM    Potassium 4.3 09/30/2021 12:00 AM    Chloride 103 09/30/2021 12:00 AM    CO2 23 09/30/2021 12:00 AM    Anion gap 6 02/24/2020 09:12 AM    Glucose 55 (L) 09/30/2021 12:00 AM    BUN 23 09/30/2021 12:00 AM    Creatinine 1.15 (H) 09/30/2021 12:00 AM    BUN/Creatinine ratio 20 09/30/2021 12:00 AM    GFR est AA 57 (L) 09/30/2021 12:00 AM    GFR est non-AA 50 (L) 09/30/2021 12:00 AM    Calcium 10.3 09/30/2021 12:00 AM    Bilirubin, total 0.2 09/30/2021 12:00 AM    ALT (SGPT) 20 09/30/2021 12:00 AM    Alk.  phosphatase 151 (H) 09/30/2021 12:00 AM    Protein, total 8.4 09/30/2021 12:00 AM    Albumin 4.9 (H) 09/30/2021 12:00 AM    Globulin 3.8 02/24/2020 09:12 AM    A-G Ratio 1.4 09/30/2021 12:00 AM        Labs:   Lab Results   Component Value Date/Time    Hemoglobin A1c 8.5 (H) 09/30/2021 12:00 AM    Hemoglobin A1c (POC) 8.2 06/03/2021 10:38 AM    Hemoglobin A1c, External 8.8 01/08/2019 12:00 AM            Assessment / Plan     Diabetes uncontrolled on Novolin 70/30 50 units twice daily. Patient no longer seeing endocrine and has declined insulin pump. GLP-1 receptor expensive for her. Consider  referral to Pharm. D. to help with coverage for GLP-1. Hypertension well controlled, on Zestoretic. Hyperlipidemia patient unable to tolerate statins due to myalgias. She will conisder doing Heart Scan to see if Repatha is an option. ICD-10-CM ICD-9-CM    1. Type 2 diabetes mellitus with stage 3a chronic kidney disease, with long-term current use of insulin (HCC)  E11.22 250.40 HEMOGLOBIN A1C W/O EAG    N18.31 585. 3     Z79.4 V58.67    2. Essential hypertension  U61 216.4 METABOLIC PANEL, COMPREHENSIVE   3. Hypercholesterolemia  E78.00 272.0 LIPID PANEL   4. Diabetic ulcer of right lower leg (HCC)  E11.622 250.80 Will treat with cephALEXin (KEFLEX) 500 mg capsule    L97.919 707.10 mupirocin (BACTROBAN) 2 % ointment and f/u with podiatry   5. Myalgia due to statin  M79.10 729.1  patient unable to tolerate statins    T46.6X5A E942.2    6. Morbid obesity ([address]  E66.01 278.01  patient strongly encouraged to cut back starches and sweets in her diet to lose weight   7. Mild persistent asthma without complication  Y32.93 331.60  controlled on Flovent   8. Vitamin D deficiency  E55.9 268.9  controlled on vitamin D 2000 units/day VITAMIN D, 25 HYDROXY   9. Needs flu shot  Z23 V04.81 FLU (FLUAD QUAD INFLUENZA VACCINE,QUAD,ADJUVANTED)                Diabetic issues reviewed with her: diabetic diet discussed in detail, and low cholesterol diet, weight control and daily exercise discussed. Follow-up and Dispositions    · Return in about 3 months (around 2/5/2022) for labs 1 week before. Reviewed plan of care. Patient has provided input and agrees with goals.

## 2021-11-12 NOTE — TELEPHONE ENCOUNTER
Noted 11/5 OV note completed and includes myalgia due to statin (M79.10, T46.6X5A) - thank you    =========================================================   For Pharmacy Admin Tracking Only - update to below     CPA in place: No   Recommendation Provided To: Provider: 1 via Note to Provider    Gap Closed?: No   Intervention Accepted By: Provider: 1   Time Spent (min): 15

## 2021-11-23 ENCOUNTER — TRANSCRIBE ORDER (OUTPATIENT)
Dept: SCHEDULING | Age: 66
End: 2021-11-23

## 2021-11-23 DIAGNOSIS — Z12.31 VISIT FOR SCREENING MAMMOGRAM: Primary | ICD-10-CM

## 2021-11-26 ENCOUNTER — HOSPITAL ENCOUNTER (OUTPATIENT)
Dept: MAMMOGRAPHY | Age: 66
Discharge: HOME | End: 2021-11-26
Attending: INTERNAL MEDICINE
Payer: MEDICARE

## 2021-11-26 DIAGNOSIS — Z12.31 VISIT FOR SCREENING MAMMOGRAM: ICD-10-CM

## 2021-11-26 PROCEDURE — 77063 BREAST TOMOSYNTHESIS BI: CPT

## 2022-02-04 ENCOUNTER — APPOINTMENT (OUTPATIENT)
Dept: INTERNAL MEDICINE CLINIC | Age: 67
End: 2022-02-04

## 2022-02-04 DIAGNOSIS — E11.22 TYPE 2 DIABETES MELLITUS WITH STAGE 3A CHRONIC KIDNEY DISEASE, WITH LONG-TERM CURRENT USE OF INSULIN (HCC): ICD-10-CM

## 2022-02-04 DIAGNOSIS — Z79.4 TYPE 2 DIABETES MELLITUS WITH STAGE 3A CHRONIC KIDNEY DISEASE, WITH LONG-TERM CURRENT USE OF INSULIN (HCC): ICD-10-CM

## 2022-02-04 DIAGNOSIS — E55.9 VITAMIN D DEFICIENCY: ICD-10-CM

## 2022-02-04 DIAGNOSIS — E78.00 HYPERCHOLESTEROLEMIA: ICD-10-CM

## 2022-02-04 DIAGNOSIS — N18.31 TYPE 2 DIABETES MELLITUS WITH STAGE 3A CHRONIC KIDNEY DISEASE, WITH LONG-TERM CURRENT USE OF INSULIN (HCC): ICD-10-CM

## 2022-02-04 DIAGNOSIS — I10 ESSENTIAL HYPERTENSION: ICD-10-CM

## 2022-02-05 LAB
25(OH)D3+25(OH)D2 SERPL-MCNC: 35.5 NG/ML (ref 30–100)
ALBUMIN SERPL-MCNC: 4.1 G/DL (ref 3.8–4.8)
ALBUMIN/GLOB SERPL: 1.4 {RATIO} (ref 1.2–2.2)
ALP SERPL-CCNC: 128 IU/L (ref 44–121)
ALT SERPL-CCNC: 26 IU/L (ref 0–32)
AST SERPL-CCNC: 30 IU/L (ref 0–40)
BILIRUB SERPL-MCNC: 0.2 MG/DL (ref 0–1.2)
BUN SERPL-MCNC: 27 MG/DL (ref 8–27)
BUN/CREAT SERPL: 23 (ref 12–28)
CALCIUM SERPL-MCNC: 9.8 MG/DL (ref 8.7–10.3)
CHLORIDE SERPL-SCNC: 104 MMOL/L (ref 96–106)
CHOLEST SERPL-MCNC: 235 MG/DL (ref 100–199)
CO2 SERPL-SCNC: 22 MMOL/L (ref 20–29)
CREAT SERPL-MCNC: 1.16 MG/DL (ref 0.57–1)
GLOBULIN SER CALC-MCNC: 2.9 G/DL (ref 1.5–4.5)
GLUCOSE SERPL-MCNC: 42 MG/DL (ref 65–99)
HBA1C MFR BLD: 8.1 % (ref 4.8–5.6)
HDLC SERPL-MCNC: 63 MG/DL
IMP & REVIEW OF LAB RESULTS: NORMAL
INTERPRETATION: NORMAL
LDLC SERPL CALC-MCNC: 157 MG/DL (ref 0–99)
POTASSIUM SERPL-SCNC: 4.3 MMOL/L (ref 3.5–5.2)
PROT SERPL-MCNC: 7 G/DL (ref 6–8.5)
SODIUM SERPL-SCNC: 143 MMOL/L (ref 134–144)
TRIGL SERPL-MCNC: 84 MG/DL (ref 0–149)
VLDLC SERPL CALC-MCNC: 15 MG/DL (ref 5–40)

## 2022-02-10 ENCOUNTER — TELEPHONE (OUTPATIENT)
Dept: PHARMACY | Age: 67
End: 2022-02-10

## 2022-02-10 DIAGNOSIS — E66.01 MORBID OBESITY (HCC): ICD-10-CM

## 2022-02-10 DIAGNOSIS — E11.21 TYPE 2 DIABETES WITH NEPHROPATHY (HCC): ICD-10-CM

## 2022-02-10 DIAGNOSIS — E78.00 HYPERCHOLESTEROLEMIA: ICD-10-CM

## 2022-02-10 DIAGNOSIS — E55.9 VITAMIN D DEFICIENCY: ICD-10-CM

## 2022-02-10 DIAGNOSIS — G72.0 STATIN MYOPATHY: Primary | ICD-10-CM

## 2022-02-10 DIAGNOSIS — T46.6X5A STATIN MYOPATHY: Primary | ICD-10-CM

## 2022-02-10 DIAGNOSIS — I10 PRIMARY HYPERTENSION: ICD-10-CM

## 2022-02-10 NOTE — TELEPHONE ENCOUNTER
Luis Pollock MD, from below, appt with you 2/11/22  - Chart reviewed d/t insurer-identified gap: diabetes rx claims and no statin rx claim; as you've noted, patient unable to tolerate statins d/t myalgias    If appropriate, please consider using one of the following CMS allowable diagnoses within visit encounter:   Myalgia due to statin (M79.10, T46.6X5A)    Statin myopathy (G72.0)  - This will complete/close this insurer-identified gap for this calendar year    Thank you,  Rocael Stauffer, PharmD, [facility], toll free: [phone], option 2     ==============================================================  [facility]: STATIN THERAPY REVIEW  Identified statin use in persons with diabetes care gap per [facility] IDENTIFIED    Lab Results   Component Value Date/Time    Cholesterol, total 235 (H) 02/04/2022 11:22 AM    HDL Cholesterol 63 02/04/2022 11:22 AM    LDL, calculated 157 (H) 02/04/2022 11:22 AM    VLDL, calculated 15 02/04/2022 11:22 AM    Triglyceride 84 02/04/2022 11:22 AM     ALT (SGPT)   Date Value Ref Range Status   02/04/2022 26 0 - 32 IU/L Final     AST (SGOT)   Date Value Ref Range Status   02/04/2022 30 0 - 40 IU/L Final     The 10-year ASCVD risk score (Urban Giraldo et al., 2013) is: 24.6%    Values used to calculate the score:      Age: 77 years      Sex: Female      Is Non- : Yes      Diabetic: Yes      Tobacco smoker: No      Systolic Blood Pressure: 078 mmHg      Is BP treated: Yes      HDL Cholesterol: 63 mg/dL      Total Cholesterol: 235 mg/dL     Hyperlipidemia Goal: likely would benefit from statin therapy; however, has not tolerated statins. As per last PCP OV note: \"off crestor which she stopped due to leg cramps. She may benefit from [address] in the future. \" and prev notes \"unable to tolerate statins due to myalgias\".  Myalgia due to statin therapy dx with 11/5/21 visit.     PLAN  - Continue to monitor   - Consider CMS allowable diagnosis code to close payor-identified statin gap    Future Appointments   Date Time Provider Shahana Becker   2/11/2022 11:40 AM Katt Encarnacion MD [facility] BS AMB

## 2022-02-11 ENCOUNTER — OFFICE VISIT (OUTPATIENT)
Dept: INTERNAL MEDICINE CLINIC | Age: 67
End: 2022-02-11
Payer: MEDICARE

## 2022-02-11 VITALS
HEIGHT: 65 IN | OXYGEN SATURATION: 98 % | SYSTOLIC BLOOD PRESSURE: 121 MMHG | TEMPERATURE: 97.6 F | BODY MASS INDEX: 41.48 KG/M2 | HEART RATE: 89 BPM | WEIGHT: 249 LBS | RESPIRATION RATE: 20 BRPM | DIASTOLIC BLOOD PRESSURE: 56 MMHG

## 2022-02-11 DIAGNOSIS — Z79.4 TYPE 2 DIABETES MELLITUS WITH STAGE 3A CHRONIC KIDNEY DISEASE, WITH LONG-TERM CURRENT USE OF INSULIN (HCC): Primary | ICD-10-CM

## 2022-02-11 DIAGNOSIS — E55.9 VITAMIN D DEFICIENCY: ICD-10-CM

## 2022-02-11 DIAGNOSIS — G72.0 STATIN MYOPATHY: ICD-10-CM

## 2022-02-11 DIAGNOSIS — E11.22 TYPE 2 DIABETES MELLITUS WITH STAGE 3A CHRONIC KIDNEY DISEASE, WITH LONG-TERM CURRENT USE OF INSULIN (HCC): Primary | ICD-10-CM

## 2022-02-11 DIAGNOSIS — E66.01 OBESITY, CLASS III, BMI 40-49.9 (MORBID OBESITY) (HCC): ICD-10-CM

## 2022-02-11 DIAGNOSIS — T46.6X5A STATIN MYOPATHY: ICD-10-CM

## 2022-02-11 DIAGNOSIS — N18.31 TYPE 2 DIABETES MELLITUS WITH STAGE 3A CHRONIC KIDNEY DISEASE, WITH LONG-TERM CURRENT USE OF INSULIN (HCC): Primary | ICD-10-CM

## 2022-02-11 DIAGNOSIS — I10 ESSENTIAL HYPERTENSION: ICD-10-CM

## 2022-02-11 DIAGNOSIS — E78.00 HYPERCHOLESTEROLEMIA: ICD-10-CM

## 2022-02-11 PROCEDURE — G8400 PT W/DXA NO RESULTS DOC: HCPCS | Performed by: INTERNAL MEDICINE

## 2022-02-11 PROCEDURE — 3017F COLORECTAL CA SCREEN DOC REV: CPT | Performed by: INTERNAL MEDICINE

## 2022-02-11 PROCEDURE — 1090F PRES/ABSN URINE INCON ASSESS: CPT | Performed by: INTERNAL MEDICINE

## 2022-02-11 PROCEDURE — G8510 SCR DEP NEG, NO PLAN REQD: HCPCS | Performed by: INTERNAL MEDICINE

## 2022-02-11 PROCEDURE — 1101F PT FALLS ASSESS-DOCD LE1/YR: CPT | Performed by: INTERNAL MEDICINE

## 2022-02-11 PROCEDURE — G9899 SCRN MAM PERF RSLTS DOC: HCPCS | Performed by: INTERNAL MEDICINE

## 2022-02-11 PROCEDURE — G8417 CALC BMI ABV UP PARAM F/U: HCPCS | Performed by: INTERNAL MEDICINE

## 2022-02-11 PROCEDURE — G8536 NO DOC ELDER MAL SCRN: HCPCS | Performed by: INTERNAL MEDICINE

## 2022-02-11 PROCEDURE — G8754 DIAS BP LESS 90: HCPCS | Performed by: INTERNAL MEDICINE

## 2022-02-11 PROCEDURE — 3052F HG A1C>EQUAL 8.0%<EQUAL 9.0%: CPT | Performed by: INTERNAL MEDICINE

## 2022-02-11 PROCEDURE — G8752 SYS BP LESS 140: HCPCS | Performed by: INTERNAL MEDICINE

## 2022-02-11 PROCEDURE — 2022F DILAT RTA XM EVC RTNOPTHY: CPT | Performed by: INTERNAL MEDICINE

## 2022-02-11 PROCEDURE — 99214 OFFICE O/P EST MOD 30 MIN: CPT | Performed by: INTERNAL MEDICINE

## 2022-02-11 PROCEDURE — G8427 DOCREV CUR MEDS BY ELIG CLIN: HCPCS | Performed by: INTERNAL MEDICINE

## 2022-02-11 NOTE — PROGRESS NOTES
Subjective:       Chief Complaint  The patient presents for follow up of diabetes, hypertension and high cholesterol. KRIS Richardson is a 77 y.o. female seen for follow up of diabetes. Shealso has hypertension and hyperlipidemia. Diabetes uncontrolled but slowly improving, on novolog 70/30  Insulin 50 units QAM and 50 units QPM, pt no longer on  Victoza 1.8 pt and is no longer seeing Endo(Dr Schuster),  Endo had recommended an insulin pump but pt was reluctant to do it, patient could not afford the GLP-1. Patient now has developed chronic kidney disease stage III , hypertension well controlled, no significant medication side effects noted, on Zestoretic, hyperlipidemia poorly controlled, off crestor which she stopped due to myalgias. She may benefit from [address] [place] [address] in the future. Pt has sleep apnea but does not use CPAP machine. Diet and Lifestyle: not attempting to follow a low fat, low cholesterol diet, does not rigorously follow a diabetic diet, exercises sporadically    Home BP Monitoring: is not measured at home. Diabetic Review of Systems - home glucose monitoring: is performed regularly, 3x/day. Other symptoms and concerns: pt needs to work on losing weight to improve her overall health her BMI of 41 puts her at increased risk of multiple medical problems    Vit D deficiency is well controlled on current supplementation. Pt continues to have knee pain due to arthritis. Asthma is well controlled on Flovent     Pt has had h/o uterine cancer and was followed by Dr Oscar Busby. She will set up follow up with Dr Marcia Rossi in the future. Current Outpatient Medications   Medication Sig    insulin NPH/insulin regular (NovoLIN 70/30 U-100 Insulin) 100 unit/mL (70-30) injection INJECT 50 UNITS SUBCUTANEOUSLY TWO TIMES A DAY    mupirocin (BACTROBAN) 2 % ointment Apply  to affected area daily.     lisinopril-hydroCHLOROthiazide (PRINZIDE, ZESTORETIC) 10-12.5 mg per tablet TAKE 1 TABLET TWICE DAILY    fluticasone propionate (Flovent HFA) 44 mcg/actuation inhaler USE 2 INHALATIONS ORALLY   TWICE DAILY    albuterol (PROAIR HFA) 90 mcg/actuation inhaler Take 2 Puffs by inhalation every four (4) hours as needed for Wheezing.  azelastine (ASTELIN) 137 mcg (0.1 %) nasal spray 2 Sprays by Both Nostrils route two (2) times a day.  cpap machine kit by Does Not Apply route. 1  Overnight CPAP at 11 CWP with ramp humidifier. 2  Mask: Miranda Aristides or mask of choice. Supplies 99 month. Please send compliance data [id number]. Diagnosis VIKKI. AHI 17 RDI 18    COMBIGAN 0.2-0.5 % drop ophthalmic solution     Calcium Carbonate-Vit D3-Min (CALTRATE 600+D PLUS MINERALS) 600 mg (1,500 mg)-400 unit Chew Take 1 Tab by mouth daily.  aspirin 81 mg Tab Take  by mouth daily.  levomefolate-B6-meB12-algal oil (Metanx, algal oil,) 3 mg-35 mg-2 mg -90.314 mg cap Take 1 Capsule by mouth daily. (Patient not taking: Reported on 11/5/2021)     No current facility-administered medications for this visit.              Review of Systems  Respiratory: negative for dyspnea on exertion  Cardiovascular: negative for chest pain    Objective:     Visit Vitals  BP (!) 121/56 (BP 1 Location: Left arm, BP Patient Position: Sitting, BP Cuff Size: Adult)   Pulse 89   Temp 97.6 °F (36.4 °C) (Temporal)   Resp 20   Ht 5' 5\" (1.651 m)   Wt 249 lb (112.9 kg)   SpO2 98%   BMI 41.44 kg/m²        General appearance - alert, well appearing, and in no distress  Chest - clear to auscultation, no wheezes, rales or rhonchi, symmetric air entry  Heart - normal rate, regular rhythm, normal S1, S2, no murmurs, rubs, clicks or gallops  Extremities - peripheral pulses normal, no pedal edema, no clubbing or cyanosis  SKin healed ulcer right lower leg    Labs:   Lab Results   Component Value Date/Time    Hemoglobin A1c 8.1 (H) 02/04/2022 11:22 AM    Hemoglobin A1c 8.5 (H) 09/30/2021 12:00 AM    Hemoglobin A1c 7.5 (H) 02/24/2020 09:12 AM Hemoglobin A1c, External 8.8 01/08/2019 12:00 AM    Hemoglobin A1c, External 10.1 % 05/23/2017 12:00 AM    Hemoglobin A1c, External 10.3 11/03/2016 12:00 AM    Glucose 42 (L) 02/04/2022 11:22 AM    Microalbumin/Creat ratio (mg/g creat) 28 10/25/2019 08:20 AM    Microalb/Creat ratio (ug/mg creat.) 10 05/27/2021 08:59 AM    Microalbumin,urine random 3.25 (H) 10/25/2019 08:20 AM    LDL, calculated 157 (H) 02/04/2022 11:22 AM    LDL, calculated 143.6 (H) 02/24/2020 09:12 AM    Creatinine, POC 1.5 (H) 01/07/2015 09:31 AM    Creatinine 1.16 (H) 02/04/2022 11:22 AM      Lab Results   Component Value Date/Time    Cholesterol, total 235 (H) 02/04/2022 11:22 AM    HDL Cholesterol 63 02/04/2022 11:22 AM    LDL, calculated 157 (H) 02/04/2022 11:22 AM    LDL, calculated 143.6 (H) 02/24/2020 09:12 AM    Triglyceride 84 02/04/2022 11:22 AM    CHOL/HDL Ratio 3.6 02/24/2020 09:12 AM       Lab Results   Component Value Date/Time    Sodium 143 02/04/2022 11:22 AM    Potassium 4.3 02/04/2022 11:22 AM    Chloride 104 02/04/2022 11:22 AM    CO2 22 02/04/2022 11:22 AM    Anion gap 6 02/24/2020 09:12 AM    Glucose 42 (L) 02/04/2022 11:22 AM    BUN 27 02/04/2022 11:22 AM    Creatinine 1.16 (H) 02/04/2022 11:22 AM    BUN/Creatinine ratio 23 02/04/2022 11:22 AM    GFR est AA 57 (L) 02/04/2022 11:22 AM    GFR est non-AA 49 (L) 02/04/2022 11:22 AM    Calcium 9.8 02/04/2022 11:22 AM    Bilirubin, total 0.2 02/04/2022 11:22 AM    ALT (SGPT) 26 02/04/2022 11:22 AM    Alk.  phosphatase 128 (H) 02/04/2022 11:22 AM    Protein, total 7.0 02/04/2022 11:22 AM    Albumin 4.1 02/04/2022 11:22 AM    Globulin 3.8 02/24/2020 09:12 AM    A-G Ratio 1.4 02/04/2022 11:22 AM        Labs:   Lab Results   Component Value Date/Time    Hemoglobin A1c 8.1 (H) 02/04/2022 11:22 AM    Hemoglobin A1c (POC) 8.2 06/03/2021 10:38 AM    Hemoglobin A1c, External 8.8 01/08/2019 12:00 AM            Assessment / Plan     Diabetes uncontrolled on Novolin 70/30 50 units twice daily. Patient no longer seeing endocrine and has declined insulin pump. GLP-1 receptor expensive for her. Consider  referral to Pharm. D. to help with coverage for GLP-1 if not better with diet by next OV. Hypertension well controlled, on Zestoretic. Hyperlipidemia patient unable to tolerate statins due to myalgias. ICD-10-CM ICD-9-CM    1. Type 2 diabetes mellitus with stage 3a chronic kidney disease, with long-term current use of insulin ([facility])  E11.22 250.40 HEMOGLOBIN A1C W/O EAG    N18.31 585. 3     Z79.4 V58.67    2. Essential hypertension  H43 527.7 METABOLIC PANEL, COMPREHENSIVE   3. Hypercholesterolemia  E78.00 272.0 LIPID PANEL   4. Obesity, Class III, BMI 40-49.9 (morbid obesity) ([facility] [address]  E66.01 278.01 Pt will work on trying to cut back more sugar and starches in her diet to lose weight    5. Statin myopathy  G72.0 359.4 Pt unable to tolerate statins for her high cholesterol     T46.6X5A E942.2    6. Vitamin D deficiency  E55.9 268.9 VITAMIN D, 25 HYDROXY                Diabetic issues reviewed with her: diabetic diet discussed in detail, and low cholesterol diet, weight control and daily exercise discussed. Follow-up and Dispositions    · Return in about 4 months (around 6/11/2022) for OV, and Medicare Wellness Visit, labs 1 week before. Reviewed plan of care. Patient has provided input and agrees with goals.

## 2022-02-11 NOTE — PATIENT INSTRUCTIONS
High Blood Pressure: Care Instructions  Overview     It's normal for blood pressure to go up and down throughout the day. But if it stays up, you have high blood pressure. Another name for high blood pressure is hypertension. Despite what a lot of people think, high blood pressure usually doesn't cause headaches or make you feel dizzy or lightheaded. It usually has no symptoms. But it does increase your risk of stroke, heart attack, and other problems. You and your doctor will talk about your risks of these problems based on your blood pressure. Your doctor will give you a goal for your blood pressure. Your goal will be based on your health and your age. Lifestyle changes, such as eating healthy and being active, are always important to help lower blood pressure. You might also take medicine to reach your blood pressure goal.  Follow-up care is a key part of your treatment and safety. Be sure to make and go to all appointments, and call your doctor if you are having problems. It's also a good idea to know your test results and keep a list of the medicines you take. How can you care for yourself at home? Medical treatment  · If you stop taking your medicine, your blood pressure will go back up. You may take one or more types of medicine to lower your blood pressure. Be safe with medicines. Take your medicine exactly as prescribed. Call your doctor if you think you are having a problem with your medicine. · Talk to your doctor before you start taking aspirin every day. Aspirin can help certain people lower their risk of a heart attack or stroke. But taking aspirin isn't right for everyone, because it can cause serious bleeding. · See your doctor regularly. You may need to see the doctor more often at first or until your blood pressure comes down. · If you are taking blood pressure medicine, talk to your doctor before you take decongestants or anti-inflammatory medicine, such as ibuprofen.  Some of these medicines can raise blood pressure. · Learn how to check your blood pressure at home. Lifestyle changes  · Stay at a healthy weight. This is especially important if you put on weight around the waist. Losing even 10 pounds can help you lower your blood pressure. · If your doctor recommends it, get more exercise. Walking is a good choice. Bit by bit, increase the amount you walk every day. Try for at least 30 minutes on most days of the week. You also may want to swim, bike, or do other activities. · Avoid or limit alcohol. Talk to your doctor about whether you can drink any alcohol. · Try to limit how much sodium you eat to less than 2,300 milligrams (mg) a day. Your doctor may ask you to try to eat less than 1,500 mg a day. · Eat plenty of fruits (such as bananas and oranges), vegetables, legumes, whole grains, and low-fat dairy products. · Lower the amount of saturated fat in your diet. Saturated fat is found in animal products such as milk, cheese, and meat. Limiting these foods may help you lose weight and also lower your risk for heart disease. · Do not smoke. Smoking increases your risk for heart attack and stroke. If you need help quitting, talk to your doctor about stop-smoking programs and medicines. These can increase your chances of quitting for good. When should you call for help? Call 911  anytime you think you may need emergency care. This may mean having symptoms that suggest that your blood pressure is causing a serious heart or blood vessel problem. Your blood pressure may be over 180/120. For example, call 911 if:    · You have symptoms of a heart attack. These may include:  ? Chest pain or pressure, or a strange feeling in the chest.  ? Sweating. ? Shortness of breath. ? Nausea or vomiting. ? Pain, pressure, or a strange feeling in the back, neck, jaw, or upper belly or in one or both shoulders or arms. ? Lightheadedness or sudden weakness.   ? A fast or irregular heartbeat.     · You have symptoms of a stroke. These may include:  ? Sudden numbness, tingling, weakness, or loss of movement in your face, arm, or leg, especially on only one side of your body. ? Sudden vision changes. ? Sudden trouble speaking. ? Sudden confusion or trouble understanding simple statements. ? Sudden problems with walking or balance. ? A sudden, severe headache that is different from past headaches.     · You have severe back or belly pain. Do not wait until your blood pressure comes down on its own. Get help right away. Call your doctor now or seek immediate care if:    · Your blood pressure is much higher than normal (such as 180/120 or higher), but you don't have symptoms.     · You think high blood pressure is causing symptoms, such as:  ? Severe headache.  ? Blurry vision. Watch closely for changes in your health, and be sure to contact your doctor if:    · Your blood pressure measures higher than your doctor recommends at least 2 times. That means the top number is higher or the bottom number is higher, or both.     · You think you may be having side effects from your blood pressure medicine. Where can you learn more? Go to http://www.gray.com/  Enter [id number] in the search box to learn more about \"High Blood Pressure: Care Instructions. \"  Current as of: April 29, 2021               Content Version: 13.0  © 2006-2021 [employer]. Care instructions adapted under license by [employer] (which disclaims liability or warranty for this information). If you have questions about a medical condition or this instruction, always ask your healthcare professional. [place] [address] any warranty or liability for your use of this information.

## 2022-02-11 NOTE — PROGRESS NOTES
Patient is in the office today for a 3 month follow up. Do you have an Advance Directive no  Do you want more information : information given     1. \"Have you been to the ER, urgent care clinic since your last visit? Hospitalized since your last visit? \" No    2. \"Have you seen or consulted any other health care providers outside of the [address] [place] [address] since your last visit? \" yes, Dr. Karmen Nino, and Dr. Thomas Guajardo. Patient states she also sees Dr. Valeria Lorenzo. 3. For patients aged 39-70: Has the patient had a colonoscopy / FIT/ Cologuard? No      If the patient is female:    4. For patients aged 41-77: Has the patient had a mammogram within the past 2 years? No      5. For patients aged 21-65: Has the patient had a pap smear?  No

## 2022-02-14 NOTE — TELEPHONE ENCOUNTER
Noted statin myopathy dx, thank you for reviewing!    =========================================================   For Pharmacy Admin Tracking Only     CPA in place: No   Recommendation Provided To: Provider: 1 via Note to Provider    Gap Closed?: Yes   Intervention Accepted By: Provider: 1   Time Spent (min): 15

## 2022-03-19 PROBLEM — E11.21 TYPE 2 DIABETES WITH NEPHROPATHY (HCC): Status: ACTIVE | Noted: 2019-05-01

## 2022-03-19 PROBLEM — E66.01 MORBID OBESITY (HCC): Status: ACTIVE | Noted: 2018-06-24

## 2022-03-23 RX ORDER — LISINOPRIL AND HYDROCHLOROTHIAZIDE 10; 12.5 MG/1; MG/1
TABLET ORAL
Qty: 180 TABLET | Refills: 1 | Status: SHIPPED | OUTPATIENT
Start: 2022-03-23 | End: 2022-08-10

## 2022-04-27 DIAGNOSIS — J45.30 MILD PERSISTENT ASTHMA WITHOUT COMPLICATION: ICD-10-CM

## 2022-04-28 RX ORDER — FLUTICASONE PROPIONATE 44 UG/1
AEROSOL, METERED RESPIRATORY (INHALATION)
Qty: 3 EACH | Refills: 3 | Status: SHIPPED | OUTPATIENT
Start: 2022-04-28

## 2022-06-03 ENCOUNTER — APPOINTMENT (OUTPATIENT)
Dept: INTERNAL MEDICINE CLINIC | Age: 67
End: 2022-06-03

## 2022-06-04 LAB
25(OH)D3+25(OH)D2 SERPL-MCNC: 42.4 NG/ML (ref 30–100)
ALBUMIN SERPL-MCNC: 4.3 G/DL (ref 3.8–4.8)
ALBUMIN/GLOB SERPL: 1.3 {RATIO} (ref 1.2–2.2)
ALP SERPL-CCNC: 118 IU/L (ref 44–121)
ALT SERPL-CCNC: 36 IU/L (ref 0–32)
AST SERPL-CCNC: 33 IU/L (ref 0–40)
BILIRUB SERPL-MCNC: 0.3 MG/DL (ref 0–1.2)
BUN SERPL-MCNC: 53 MG/DL (ref 8–27)
BUN/CREAT SERPL: 39 (ref 12–28)
CALCIUM SERPL-MCNC: 9.7 MG/DL (ref 8.7–10.3)
CHLORIDE SERPL-SCNC: 101 MMOL/L (ref 96–106)
CHOLEST SERPL-MCNC: 235 MG/DL (ref 100–199)
CO2 SERPL-SCNC: 22 MMOL/L (ref 20–29)
CREAT SERPL-MCNC: 1.36 MG/DL (ref 0.57–1)
EGFR: 43 ML/MIN/1.73
GLOBULIN SER CALC-MCNC: 3.3 G/DL (ref 1.5–4.5)
GLUCOSE SERPL-MCNC: 107 MG/DL (ref 65–99)
HBA1C MFR BLD: 9 % (ref 4.8–5.6)
HDLC SERPL-MCNC: 68 MG/DL
IMP & REVIEW OF LAB RESULTS: NORMAL
INTERPRETATION: NORMAL
LDLC SERPL CALC-MCNC: 155 MG/DL (ref 0–99)
POTASSIUM SERPL-SCNC: 4.8 MMOL/L (ref 3.5–5.2)
PROT SERPL-MCNC: 7.6 G/DL (ref 6–8.5)
SODIUM SERPL-SCNC: 138 MMOL/L (ref 134–144)
TRIGL SERPL-MCNC: 72 MG/DL (ref 0–149)
VLDLC SERPL CALC-MCNC: 12 MG/DL (ref 5–40)

## 2022-06-08 ENCOUNTER — TELEPHONE (OUTPATIENT)
Dept: INTERNAL MEDICINE CLINIC | Age: 67
End: 2022-06-08

## 2022-06-10 ENCOUNTER — OFFICE VISIT (OUTPATIENT)
Dept: INTERNAL MEDICINE CLINIC | Age: 67
End: 2022-06-10
Payer: MEDICARE

## 2022-06-10 VITALS
BODY MASS INDEX: 39.15 KG/M2 | HEART RATE: 84 BPM | HEIGHT: 65 IN | WEIGHT: 235 LBS | TEMPERATURE: 97.7 F | DIASTOLIC BLOOD PRESSURE: 60 MMHG | SYSTOLIC BLOOD PRESSURE: 136 MMHG | RESPIRATION RATE: 20 BRPM | OXYGEN SATURATION: 99 %

## 2022-06-10 DIAGNOSIS — E11.22 TYPE 2 DIABETES MELLITUS WITH STAGE 3A CHRONIC KIDNEY DISEASE, WITH LONG-TERM CURRENT USE OF INSULIN (HCC): ICD-10-CM

## 2022-06-10 DIAGNOSIS — E78.00 HYPERCHOLESTEROLEMIA: ICD-10-CM

## 2022-06-10 DIAGNOSIS — I10 ESSENTIAL HYPERTENSION: ICD-10-CM

## 2022-06-10 DIAGNOSIS — Z00.00 MEDICARE ANNUAL WELLNESS VISIT, SUBSEQUENT: Primary | ICD-10-CM

## 2022-06-10 DIAGNOSIS — N18.31 TYPE 2 DIABETES MELLITUS WITH STAGE 3A CHRONIC KIDNEY DISEASE, WITH LONG-TERM CURRENT USE OF INSULIN (HCC): ICD-10-CM

## 2022-06-10 DIAGNOSIS — Z79.4 TYPE 2 DIABETES MELLITUS WITH STAGE 3A CHRONIC KIDNEY DISEASE, WITH LONG-TERM CURRENT USE OF INSULIN (HCC): ICD-10-CM

## 2022-06-10 DIAGNOSIS — E66.01 CLASS 2 SEVERE OBESITY DUE TO EXCESS CALORIES WITH SERIOUS COMORBIDITY AND BODY MASS INDEX (BMI) OF 39.0 TO 39.9 IN ADULT (HCC): ICD-10-CM

## 2022-06-10 PROBLEM — N18.30 CHRONIC RENAL DISEASE, STAGE III (HCC): Status: ACTIVE | Noted: 2022-06-10

## 2022-06-10 PROCEDURE — G8754 DIAS BP LESS 90: HCPCS | Performed by: INTERNAL MEDICINE

## 2022-06-10 PROCEDURE — 1101F PT FALLS ASSESS-DOCD LE1/YR: CPT | Performed by: INTERNAL MEDICINE

## 2022-06-10 PROCEDURE — 99213 OFFICE O/P EST LOW 20 MIN: CPT | Performed by: INTERNAL MEDICINE

## 2022-06-10 PROCEDURE — G8510 SCR DEP NEG, NO PLAN REQD: HCPCS | Performed by: INTERNAL MEDICINE

## 2022-06-10 PROCEDURE — 2022F DILAT RTA XM EVC RTNOPTHY: CPT | Performed by: INTERNAL MEDICINE

## 2022-06-10 PROCEDURE — G8536 NO DOC ELDER MAL SCRN: HCPCS | Performed by: INTERNAL MEDICINE

## 2022-06-10 PROCEDURE — 3017F COLORECTAL CA SCREEN DOC REV: CPT | Performed by: INTERNAL MEDICINE

## 2022-06-10 PROCEDURE — G8427 DOCREV CUR MEDS BY ELIG CLIN: HCPCS | Performed by: INTERNAL MEDICINE

## 2022-06-10 PROCEDURE — 3046F HEMOGLOBIN A1C LEVEL >9.0%: CPT | Performed by: INTERNAL MEDICINE

## 2022-06-10 PROCEDURE — G8417 CALC BMI ABV UP PARAM F/U: HCPCS | Performed by: INTERNAL MEDICINE

## 2022-06-10 PROCEDURE — G8752 SYS BP LESS 140: HCPCS | Performed by: INTERNAL MEDICINE

## 2022-06-10 PROCEDURE — G9899 SCRN MAM PERF RSLTS DOC: HCPCS | Performed by: INTERNAL MEDICINE

## 2022-06-10 PROCEDURE — 1123F ACP DISCUSS/DSCN MKR DOCD: CPT | Performed by: INTERNAL MEDICINE

## 2022-06-10 PROCEDURE — G8400 PT W/DXA NO RESULTS DOC: HCPCS | Performed by: INTERNAL MEDICINE

## 2022-06-10 PROCEDURE — G0438 PPPS, INITIAL VISIT: HCPCS | Performed by: INTERNAL MEDICINE

## 2022-06-10 PROCEDURE — 1090F PRES/ABSN URINE INCON ASSESS: CPT | Performed by: INTERNAL MEDICINE

## 2022-06-10 NOTE — PROGRESS NOTES
Subjective:       Chief Complaint  The patient presents for follow up of diabetes, hypertension and high cholesterol. HPI Leopoldo Brake is a 77 y.o. female seen for follow up of diabetes. Shealso has hypertension and hyperlipidemia. Diabetes uncontrolled  on novolog 70/30  Insulin 50 units QAM and 50 units QPM, pt no longer on  Victoza 1.8 pt and is no longer seeing Endo(Dr Schuster),  Endo had recommended an insulin pump but pt was reluctant to do it, patient could not afford the GLP-1. Patient now has developed chronic kidney disease stage III , hypertension well controlled, no significant medication side effects noted, on Zestoretic, hyperlipidemia poorly controlled, off crestor which she stopped due to myalgias. She may benefit from [address] [place] [address] in the future. Pt has sleep apnea but does not use CPAP machine. Patient has some azotemia with her BMP that was done today. She will try and hydrate herself and will recheck BMP in the next few weeks    Diet and Lifestyle: not attempting to follow a low fat, low cholesterol diet, does not rigorously follow a diabetic diet, exercises sporadically    Home BP Monitoring: is not measured at home. Diabetic Review of Systems - home glucose monitoring: is performed regularly, 3x/day. Other symptoms and concerns: pt needs to work on losing weight to improve her overall health her BMI of 39 puts her at increased risk of multiple medical problems    Vit D deficiency is well controlled on current supplementation. Pt continues to have knee pain due to arthritis. Asthma is well controlled on Flovent     Pt has had h/o uterine cancer and was followed by Dr Praneeth Coulter. She will set up follow up with Dr Milana Cook in the future.        Current Outpatient Medications   Medication Sig    fluticasone propionate (Flovent HFA) 44 mcg/actuation inhaler INHALE 2 PUFFS TWICE DAILY    lisinopril-hydroCHLOROthiazide (PRINZIDE, ZESTORETIC) 10-12.5 mg per tablet TAKE 1 TABLET TWICE DAILY    insulin NPH/insulin regular (NovoLIN 70/30 U-100 Insulin) 100 unit/mL (70-30) injection INJECT 50 UNITS SUBCUTANEOUSLY TWO TIMES A DAY    mupirocin (BACTROBAN) 2 % ointment Apply  to affected area daily.  albuterol (PROAIR HFA) 90 mcg/actuation inhaler Take 2 Puffs by inhalation every four (4) hours as needed for Wheezing.  azelastine (ASTELIN) 137 mcg (0.1 %) nasal spray 2 Sprays by Both Nostrils route two (2) times a day.  cpap machine kit by Does Not Apply route. 1  Overnight CPAP at 11 CWP with ramp humidifier. 2  Mask: Silva and Pykel Aristides or mask of choice. Supplies 99 month. Please send compliance data [id number]. Diagnosis VIKKI. AHI 17 RDI 18    COMBIGAN 0.2-0.5 % drop ophthalmic solution     Calcium Carbonate-Vit D3-Min (CALTRATE 600+D PLUS MINERALS) 600 mg (1,500 mg)-400 unit Chew Take 1 Tab by mouth daily.  aspirin 81 mg Tab Take  by mouth daily.  levomefolate-B6-meB12-algal oil (Metanx, algal oil,) 3 mg-35 mg-2 mg -90.314 mg cap Take 1 Capsule by mouth daily. (Patient not taking: Reported on 11/5/2021)     No current facility-administered medications for this visit.              Review of Systems  Respiratory: negative for dyspnea on exertion  Cardiovascular: negative for chest pain    Objective:     Visit Vitals  /60 (BP 1 Location: Left arm, BP Patient Position: Sitting, BP Cuff Size: Adult)   Pulse 84   Temp 97.7 °F (36.5 °C) (Temporal)   Resp 20   Ht 5' 5\" (1.651 m)   Wt 235 lb (106.6 kg)   SpO2 99%   BMI 39.11 kg/m²        General appearance - alert, well appearing, and in no distress  Chest - clear to auscultation, no wheezes, rales or rhonchi, symmetric air entry  Heart - normal rate, regular rhythm, normal S1, S2, no murmurs, rubs, clicks or gallops  Extremities - peripheral pulses normal, no pedal edema, no clubbing or cyanosis      Labs:   Lab Results   Component Value Date/Time    Hemoglobin A1c 9.0 (H) 06/03/2022 10:10 AM    Hemoglobin A1c 8.1 (H) 02/04/2022 11:22 AM    Hemoglobin A1c 8.5 (H) 09/30/2021 12:00 AM    Hemoglobin A1c, External 8.8 01/08/2019 12:00 AM    Hemoglobin A1c, External 10.1 % 05/23/2017 12:00 AM    Hemoglobin A1c, External 10.3 11/03/2016 12:00 AM    Glucose 107 (H) 06/03/2022 10:10 AM    Microalbumin/Creat ratio (mg/g creat) 28 10/25/2019 08:20 AM    Microalb/Creat ratio (ug/mg creat.) 10 05/27/2021 08:59 AM    Microalbumin,urine random 3.25 (H) 10/25/2019 08:20 AM    LDL, calculated 155 (H) 06/03/2022 10:10 AM    LDL, calculated 143.6 (H) 02/24/2020 09:12 AM    Creatinine, POC 1.5 (H) 01/07/2015 09:31 AM    Creatinine 1.36 (H) 06/03/2022 10:10 AM      Lab Results   Component Value Date/Time    Cholesterol, total 235 (H) 06/03/2022 10:10 AM    HDL Cholesterol 68 06/03/2022 10:10 AM    LDL, calculated 155 (H) 06/03/2022 10:10 AM    LDL, calculated 143.6 (H) 02/24/2020 09:12 AM    Triglyceride 72 06/03/2022 10:10 AM    CHOL/HDL Ratio 3.6 02/24/2020 09:12 AM       Lab Results   Component Value Date/Time    Sodium 138 06/03/2022 10:10 AM    Potassium 4.8 06/03/2022 10:10 AM    Chloride 101 06/03/2022 10:10 AM    CO2 22 06/03/2022 10:10 AM    Anion gap 6 02/24/2020 09:12 AM    Glucose 107 (H) 06/03/2022 10:10 AM    BUN 53 (H) 06/03/2022 10:10 AM    Creatinine 1.36 (H) 06/03/2022 10:10 AM    BUN/Creatinine ratio 39 (H) 06/03/2022 10:10 AM    GFR est AA 57 (L) 02/04/2022 11:22 AM    GFR est non-AA 49 (L) 02/04/2022 11:22 AM    Calcium 9.7 06/03/2022 10:10 AM    Bilirubin, total 0.3 06/03/2022 10:10 AM    ALT (SGPT) 36 (H) 06/03/2022 10:10 AM    Alk.  phosphatase 118 06/03/2022 10:10 AM    Protein, total 7.6 06/03/2022 10:10 AM    Albumin 4.3 06/03/2022 10:10 AM    Globulin 3.8 02/24/2020 09:12 AM    A-G Ratio 1.3 06/03/2022 10:10 AM        Labs:   Lab Results   Component Value Date/Time    Hemoglobin A1c 9.0 (H) 06/03/2022 10:10 AM    Hemoglobin A1c (POC) 8.2 06/03/2021 10:38 AM    Hemoglobin A1c, External 8.8 01/08/2019 12:00 AM Assessment / Plan     Diabetes uncontrolled on Novolin 70/30 50 units twice daily. Patient no longer seeing endocrine and has declined insulin pump. GLP-1 too expensive for her. Currently not a lot of options for patient except intense weight loss or increasing insulin further which will cause more weight gain. Hypertension well controlled, on Zestoretic. Hyperlipidemia uncontrolled  patient unable to tolerate statins due to myalgias. ICD-10-CM ICD-9-CM    1. Medicare annual wellness visit, subsequent  Z00.00 V70.0    2. Type 2 diabetes mellitus with stage 3a chronic kidney disease, with long-term current use of insulin ([facility])  B90.00 984.87 METABOLIC PANEL, BASIC    S01.79 585.3 CBC WITH AUTOMATED DIFF    Z79.4 V58.67    3. Essential hypertension  S57 343.5 METABOLIC PANEL, BASIC      MICROALBUMIN, UR, RAND W/ MICROALB/CREAT RATIO   4. Hypercholesterolemia  E78.00 272.0    5. Class 2 severe obesity due to excess calories with serious comorbidity and body mass index (BMI) of 39.0 to 39.9 in adult [facility])  E66.01 278.01  patient will work on trying to lose weight by cutting back starches and sugar in her diet. Z68.39 V85.39                 Diabetic issues reviewed with her: diabetic diet discussed in detail, and low cholesterol diet, weight control and daily exercise discussed. Follow-up and Dispositions    · Return in about 5 weeks (around 7/15/2022) for labs 1 week before. Reviewed plan of care. Patient has provided input and agrees with goals.

## 2022-06-10 NOTE — PROGRESS NOTES
This is the Initial  Medicare Annual Wellness Exam, performed once in a lifetime. I have reviewed the patient's medical history in detail and updated the computerized patient record. Assessment/Plan   Education and counseling provided:  Are appropriate based on today's review and evaluation  End-of-Life planning (with patient's consent)    1. Medicare annual wellness visit, subsequent  2. Type 2 diabetes mellitus with stage 3a chronic kidney disease, with long-term current use of insulin (HCC)  -     METABOLIC PANEL, BASIC; Future  -     CBC WITH AUTOMATED DIFF; Future  3. Essential hypertension  -     METABOLIC PANEL, BASIC; Future  -     MICROALBUMIN, UR, RAND W/ MICROALB/CREAT RATIO; Future  4. Hypercholesterolemia  5. Class 2 severe obesity due to excess calories with serious comorbidity and body mass index (BMI) of 39.0 to 39.9 in adult [facility])       Depression Risk Factor Screening     3 most recent PHQ Screens 6/10/2022   Little interest or pleasure in doing things Not at all   Feeling down, depressed, irritable, or hopeless Not at all   Total Score PHQ 2 0       Alcohol & Drug Abuse Risk Screen    Do you average more than 1 drink per night or more than 7 drinks a week:  No    On any one occasion in the past three months have you have had more than 3 drinks containing alcohol:  No          Functional Ability and Level of Safety    Hearing: Hearing is good. Activities of Daily Living: The home contains: no safety equipment. Patient does total self care      Ambulation: with no difficulty     Fall Risk:  Fall Risk Assessment, last 12 mths 6/10/2022   Able to walk? Yes   Fall in past 12 months? 0   Do you feel unsteady? 0   Are you worried about falling 0      Abuse Screen:  Patient is not abused       Cognitive Screening    Has your family/caregiver stated any concerns about your memory: no     Cognitive Screening: Normal - .     Health Maintenance Due     Health Maintenance Due   Topic Date Due  COVID-19 Vaccine (1) Never done    DTaP/Tdap/Td series (1 - Tdap) Never done    Foot Exam Q1  12/28/2018    Bone Densitometry (Dexa) Screening  Never done    MICROALBUMIN Q1  05/27/2022    Pneumococcal 65+ years (2 - PCV) 06/03/2022     Glaucoma Screening-  UTD  Pneumonia Vaccine-Pneumovax 23 done.    Shingles Vaccine- UTD  Tdap Vaccine- not UTD  Colonoscopy-  9/2020 Dr Lyndon Tran f/u 7 yrs  Mammogram 11/2021  Advance Directive-  Does not have one   Patient Care Team   Patient Care Team:  Beth Lucas MD as PCP - General (Internal Medicine Physician)  Beth Lucas MD as P[facility]  Gabi Horne MD (Ophthalmology)  Orly Forde MD (Ophthalmology)  Alexa Mccord DPM (Podiatry)    History     Patient Active Problem List   Diagnosis Code    Hand paresthesia R20.2    Hypercholesterolemia E78.00    HTN (hypertension) I10    Vitamin D deficiency E55.9    DJD (degenerative joint disease) M19.90    Glaucoma H40.9    Moderate obstructive sleep apnea G47.33    Type 2 diabetes mellitus without complication ([address] K66.5    Morbid obesity ([address] E66.01    Type 2 diabetes with nephropathy ([address] E11.21    Chronic renal disease, stage III N18.30     Past Medical History:   Diagnosis Date    CTS (carpal tunnel syndrome)     DJD (degenerative joint disease) of knee     Left Knee    Glaucoma     Hand paresthesia 4/7/2010    Hypercholesterolemia     Hypertension     IDDM     Followed by Dr. Ashley Perkins       Past Surgical History:   Procedure Laterality Date    ENDOSCOPY, COLON, DIAGNOSTIC  11-    normal ;      Current Outpatient Medications   Medication Sig Dispense Refill    fluticasone propionate (Flovent HFA) 44 mcg/actuation inhaler INHALE 2 PUFFS TWICE DAILY 3 Each 3    lisinopril-hydroCHLOROthiazide (PRINZIDE, ZESTORETIC) 10-12.5 mg per tablet TAKE 1 TABLET TWICE DAILY 180 Tablet 1    insulin NPH/insulin regular (NovoLIN 70/30 U-100 Insulin) 100 unit/mL (70-30) injection INJECT 50 UNITS SUBCUTANEOUSLY TWO TIMES A DAY 70 mL 5    mupirocin (BACTROBAN) 2 % ointment Apply  to affected area daily. 22 g 0    albuterol (PROAIR HFA) 90 mcg/actuation inhaler Take 2 Puffs by inhalation every four (4) hours as needed for Wheezing. 3 Inhaler 3    azelastine (ASTELIN) 137 mcg (0.1 %) nasal spray 2 Sprays by Both Nostrils route two (2) times a day. 1 Bottle 5    cpap machine kit by Does Not Apply route. 1  Overnight CPAP at 11 CWP with ramp humidifier. 2  Mask: Silva and Pykel Aristides or mask of choice. Supplies 99 month. Please send compliance data [id number]. Diagnosis VIKKI. AHI 17 RDI 18 1 Kit 0    COMBIGAN 0.2-0.5 % drop ophthalmic solution       Calcium Carbonate-Vit D3-Min (CALTRATE 600+D PLUS MINERALS) 600 mg (1,500 mg)-400 unit Chew Take 1 Tab by mouth daily.  aspirin 81 mg Tab Take  by mouth daily.  levomefolate-B6-meB12-algal oil (Metanx, algal oil,) 3 mg-35 mg-2 mg -90.314 mg cap Take 1 Capsule by mouth daily. (Patient not taking: Reported on 11/5/2021) 90 Capsule 3     Allergies   Allergen Reactions    Crestor [Rosuvastatin] Myalgia       Family History   Problem Relation Age of Onset    Kidney Disease Mother     Hypertension Mother     Diabetes Mother     Colon Polyps Mother     Heart Attack Mother     Cancer Father     Diabetes Sister     Diabetes Brother     Hypertension Maternal Aunt     Diabetes Maternal Uncle     Heart Disease Maternal Uncle     Diabetes Sister     Diabetes Brother     Diabetes Brother      Social History     Tobacco Use    Smoking status: Never Smoker    Smokeless tobacco: Never Used   Substance Use Topics    Alcohol use: No     A comprehensive 5 year plan for medical care and screening exams was reviewed with pt and they received a copy of it.         Deangelo Kumar LPN

## 2022-06-10 NOTE — PATIENT INSTRUCTIONS
High Blood Pressure: Care Instructions  Overview     It's normal for blood pressure to go up and down throughout the day. But if it stays up, you have high blood pressure. Another name for high blood pressure is hypertension. Despite what a lot of people think, high blood pressure usually doesn't cause headaches or make you feel dizzy or lightheaded. It usually has no symptoms. But it does increase your risk of stroke, heart attack, and other problems. You and your doctor will talk about your risks of these problems based on your blood pressure. Your doctor will give you a goal for your blood pressure. Your goal will be based on your health and your age. Lifestyle changes, such as eating healthy and being active, are always important to help lower blood pressure. You might also take medicine to reach your blood pressure goal.  Follow-up care is a key part of your treatment and safety. Be sure to make and go to all appointments, and call your doctor if you are having problems. It's also a good idea to know your test results and keep a list of the medicines you take. How can you care for yourself at home? Medical treatment  · If you stop taking your medicine, your blood pressure will go back up. You may take one or more types of medicine to lower your blood pressure. Be safe with medicines. Take your medicine exactly as prescribed. Call your doctor if you think you are having a problem with your medicine. · Talk to your doctor before you start taking aspirin every day. Aspirin can help certain people lower their risk of a heart attack or stroke. But taking aspirin isn't right for everyone, because it can cause serious bleeding. · See your doctor regularly. You may need to see the doctor more often at first or until your blood pressure comes down. · If you are taking blood pressure medicine, talk to your doctor before you take decongestants or anti-inflammatory medicine, such as ibuprofen.  Some of these medicines can raise blood pressure. · Learn how to check your blood pressure at home. Lifestyle changes  · Stay at a healthy weight. This is especially important if you put on weight around the waist. Losing even 10 pounds can help you lower your blood pressure. · If your doctor recommends it, get more exercise. Walking is a good choice. Bit by bit, increase the amount you walk every day. Try for at least 30 minutes on most days of the week. You also may want to swim, bike, or do other activities. · Avoid or limit alcohol. Talk to your doctor about whether you can drink any alcohol. · Try to limit how much sodium you eat to less than 2,300 milligrams (mg) a day. Your doctor may ask you to try to eat less than 1,500 mg a day. · Eat plenty of fruits (such as bananas and oranges), vegetables, legumes, whole grains, and low-fat dairy products. · Lower the amount of saturated fat in your diet. Saturated fat is found in animal products such as milk, cheese, and meat. Limiting these foods may help you lose weight and also lower your risk for heart disease. · Do not smoke. Smoking increases your risk for heart attack and stroke. If you need help quitting, talk to your doctor about stop-smoking programs and medicines. These can increase your chances of quitting for good. When should you call for help? Call 911  anytime you think you may need emergency care. This may mean having symptoms that suggest that your blood pressure is causing a serious heart or blood vessel problem. Your blood pressure may be over 180/120. For example, call 911 if:    · You have symptoms of a heart attack. These may include:  ? Chest pain or pressure, or a strange feeling in the chest.  ? Sweating. ? Shortness of breath. ? Nausea or vomiting. ? Pain, pressure, or a strange feeling in the back, neck, jaw, or upper belly or in one or both shoulders or arms. ? Lightheadedness or sudden weakness.   ? A fast or irregular heartbeat.     · You have symptoms of a stroke. These may include:  ? Sudden numbness, tingling, weakness, or loss of movement in your face, arm, or leg, especially on only one side of your body. ? Sudden vision changes. ? Sudden trouble speaking. ? Sudden confusion or trouble understanding simple statements. ? Sudden problems with walking or balance. ? A sudden, severe headache that is different from past headaches.     · You have severe back or belly pain. Do not wait until your blood pressure comes down on its own. Get help right away. Call your doctor now or seek immediate care if:    · Your blood pressure is much higher than normal (such as 180/120 or higher), but you don't have symptoms.     · You think high blood pressure is causing symptoms, such as:  ? Severe headache.  ? Blurry vision. Watch closely for changes in your health, and be sure to contact your doctor if:    · Your blood pressure measures higher than your doctor recommends at least 2 times. That means the top number is higher or the bottom number is higher, or both.     · You think you may be having side effects from your blood pressure medicine. Where can you learn more? Go to http://www.gray.com/  Enter [id number] in the search box to learn more about \"High Blood Pressure: Care Instructions. \"  Current as of: January 10, 2022               Content Version: 13.2  © 2006-2022 [employer]. Care instructions adapted under license by [employer] (which disclaims liability or warranty for this information). If you have questions about a medical condition or this instruction, always ask your healthcare professional. [place] [address] any warranty or liability for your use of this information.       Medicare Wellness Visit, Female     The best way to live healthy is to have a lifestyle where you eat a well-balanced diet, exercise regularly, limit alcohol use, and quit all forms of tobacco/nicotine, if applicable. Regular preventive services are another way to keep healthy. Preventive services (vaccines, screening tests, monitoring & exams) can help personalize your care plan, which helps you manage your own care. Screening tests can find health problems at the earliest stages, when they are easiest to treat. [facility] follows the current, evidence-based guidelines published by the [facility] ([facility]) when recommending preventive services for our patients. Because we follow these guidelines, sometimes recommendations change over time as research supports it. (For example, mammograms used to be recommended annually. Even though Medicare will still pay for an annual mammogram, the newer guidelines recommend a mammogram every two years for women of average risk). Of course, you and your doctor may decide to screen more often for some diseases, based on your risk and your co-morbidities (chronic disease you are already diagnosed with). Preventive services for you include:  - Medicare offers their members a free annual wellness visit, which is time for you and your primary care provider to discuss and plan for your preventive service needs. Take advantage of this benefit every year!  -All adults over the age of 72 should receive the recommended pneumonia vaccines. Current USPSTF guidelines recommend a series of two vaccines for the best pneumonia protection.   -All adults should have a flu vaccine yearly and a tetanus vaccine every 10 years.   -All adults age 48 and older should receive the shingles vaccines (series of two vaccines).       -All adults age 38-68 who are overweight should have a diabetes screening test once every three years.   -All adults born between [birth year] and 1965 should be screened once for Hepatitis C.  -Other screening tests and preventive services for persons with diabetes include: an eye exam to screen for diabetic retinopathy, a kidney function test, a foot exam, and stricter control over your cholesterol.   -Cardiovascular screening for adults with routine risk involves an electrocardiogram (ECG) at intervals determined by your doctor.   -Colorectal cancer screenings should be done for adults age 54-65 with no increased risk factors for colorectal cancer. There are a number of acceptable methods of screening for this type of cancer. Each test has its own benefits and drawbacks. Discuss with your doctor what is most appropriate for you during your annual wellness visit. The different tests include: colonoscopy (considered the best screening method), a fecal occult blood test, a fecal DNA test, and sigmoidoscopy.    -A bone mass density test is recommended when a woman turns 65 to screen for osteoporosis. This test is only recommended one time, as a screening. Some providers will use this same test as a disease monitoring tool if you already have osteoporosis. -Breast cancer screenings are recommended every other year for women of normal risk, age 54-69.  -Cervical cancer screenings for women over age 72 are only recommended with certain risk factors.      Here is a list of your current Health Maintenance items (your personalized list of preventive services) with a due date:  Health Maintenance Due   Topic Date Due    COVID-19 Vaccine (1) Never done    DTaP/Tdap/Td  (1 - Tdap) Never done    Diabetic Foot Care  12/28/2018    Bone Mineral Density   Never done    Albumin Urine Test  05/27/2022    Pneumococcal Vaccine (2 - PCV) 06/03/2022

## 2022-06-10 NOTE — PROGRESS NOTES
Patient is in the office today for a 4 month follow up and medicare wellness visit. Do you have an Advance Directive no  Do you want more information : information given    1. \"Have you been to the ER, urgent care clinic since your last visit? Hospitalized since your last visit? \" No    2. \"Have you seen or consulted any other health care providers outside of the [address] [place] [address] since your last visit? \" No     3. For patients aged 39-70: Has the patient had a colonoscopy / FIT/ Cologuard? Yes - no Care Gap present      If the patient is female:    4. For patients aged 41-77: Has the patient had a mammogram within the past 2 years? Yes - no Care Gap present      5. For patients aged 21-65: Has the patient had a pap smear?  NA - based on age or sex

## 2022-08-02 ENCOUNTER — APPOINTMENT (OUTPATIENT)
Dept: INTERNAL MEDICINE CLINIC | Age: 67
End: 2022-08-02

## 2022-08-02 DIAGNOSIS — E11.22 TYPE 2 DIABETES MELLITUS WITH STAGE 3A CHRONIC KIDNEY DISEASE, WITH LONG-TERM CURRENT USE OF INSULIN (HCC): ICD-10-CM

## 2022-08-02 DIAGNOSIS — E78.00 HYPERCHOLESTEROLEMIA: ICD-10-CM

## 2022-08-02 DIAGNOSIS — E55.9 VITAMIN D DEFICIENCY: ICD-10-CM

## 2022-08-02 DIAGNOSIS — N18.31 TYPE 2 DIABETES MELLITUS WITH STAGE 3A CHRONIC KIDNEY DISEASE, WITH LONG-TERM CURRENT USE OF INSULIN (HCC): ICD-10-CM

## 2022-08-02 DIAGNOSIS — I10 ESSENTIAL HYPERTENSION: ICD-10-CM

## 2022-08-02 DIAGNOSIS — Z79.4 TYPE 2 DIABETES MELLITUS WITH STAGE 3A CHRONIC KIDNEY DISEASE, WITH LONG-TERM CURRENT USE OF INSULIN (HCC): ICD-10-CM

## 2022-08-03 LAB
BASOPHILS # BLD AUTO: 0 X10E3/UL (ref 0–0.2)
BASOPHILS NFR BLD AUTO: 1 %
BUN SERPL-MCNC: 42 MG/DL (ref 8–27)
BUN/CREAT SERPL: 35 (ref 12–28)
CALCIUM SERPL-MCNC: 9.8 MG/DL (ref 8.7–10.3)
CHLORIDE SERPL-SCNC: 103 MMOL/L (ref 96–106)
CO2 SERPL-SCNC: 20 MMOL/L (ref 20–29)
CREAT SERPL-MCNC: 1.2 MG/DL (ref 0.57–1)
EGFR: 50 ML/MIN/1.73
EOSINOPHIL # BLD AUTO: 0.4 X10E3/UL (ref 0–0.4)
EOSINOPHIL NFR BLD AUTO: 6 %
ERYTHROCYTE [DISTWIDTH] IN BLOOD BY AUTOMATED COUNT: 15.9 % (ref 11.7–15.4)
GLUCOSE SERPL-MCNC: 95 MG/DL (ref 65–99)
HCT VFR BLD AUTO: 35.7 % (ref 34–46.6)
HGB BLD-MCNC: 11.8 G/DL (ref 11.1–15.9)
IMM GRANULOCYTES # BLD AUTO: 0 X10E3/UL (ref 0–0.1)
IMM GRANULOCYTES NFR BLD AUTO: 0 %
INTERPRETATION: NORMAL
LYMPHOCYTES # BLD AUTO: 2.8 X10E3/UL (ref 0.7–3.1)
LYMPHOCYTES NFR BLD AUTO: 36 %
MCH RBC QN AUTO: 25.2 PG (ref 26.6–33)
MCHC RBC AUTO-ENTMCNC: 33.1 G/DL (ref 31.5–35.7)
MCV RBC AUTO: 76 FL (ref 79–97)
MONOCYTES # BLD AUTO: 0.7 X10E3/UL (ref 0.1–0.9)
MONOCYTES NFR BLD AUTO: 8 %
NEUTROPHILS # BLD AUTO: 4 X10E3/UL (ref 1.4–7)
NEUTROPHILS NFR BLD AUTO: 49 %
PLATELET # BLD AUTO: 328 X10E3/UL (ref 150–450)
POTASSIUM SERPL-SCNC: 4.9 MMOL/L (ref 3.5–5.2)
RBC # BLD AUTO: 4.68 X10E6/UL (ref 3.77–5.28)
SODIUM SERPL-SCNC: 139 MMOL/L (ref 134–144)
SPECIMEN STATUS REPORT, ROLRST: NORMAL
WBC # BLD AUTO: 8 X10E3/UL (ref 3.4–10.8)

## 2022-08-10 RX ORDER — LISINOPRIL AND HYDROCHLOROTHIAZIDE 10; 12.5 MG/1; MG/1
TABLET ORAL
Qty: 180 TABLET | Refills: 1 | Status: SHIPPED | OUTPATIENT
Start: 2022-08-10

## 2022-08-11 ENCOUNTER — OFFICE VISIT (OUTPATIENT)
Dept: INTERNAL MEDICINE CLINIC | Age: 67
End: 2022-08-11
Payer: MEDICARE

## 2022-08-11 VITALS
HEIGHT: 65 IN | BODY MASS INDEX: 39.49 KG/M2 | WEIGHT: 237 LBS | SYSTOLIC BLOOD PRESSURE: 126 MMHG | RESPIRATION RATE: 18 BRPM | OXYGEN SATURATION: 100 % | DIASTOLIC BLOOD PRESSURE: 44 MMHG | HEART RATE: 91 BPM | TEMPERATURE: 97.6 F

## 2022-08-11 DIAGNOSIS — Z79.4 TYPE 2 DIABETES MELLITUS WITH STAGE 3A CHRONIC KIDNEY DISEASE, WITH LONG-TERM CURRENT USE OF INSULIN (HCC): Primary | ICD-10-CM

## 2022-08-11 DIAGNOSIS — I10 ESSENTIAL HYPERTENSION: ICD-10-CM

## 2022-08-11 DIAGNOSIS — N18.31 TYPE 2 DIABETES MELLITUS WITH STAGE 3A CHRONIC KIDNEY DISEASE, WITH LONG-TERM CURRENT USE OF INSULIN (HCC): Primary | ICD-10-CM

## 2022-08-11 DIAGNOSIS — E11.22 TYPE 2 DIABETES MELLITUS WITH STAGE 3A CHRONIC KIDNEY DISEASE, WITH LONG-TERM CURRENT USE OF INSULIN (HCC): Primary | ICD-10-CM

## 2022-08-11 PROCEDURE — G8752 SYS BP LESS 140: HCPCS | Performed by: INTERNAL MEDICINE

## 2022-08-11 PROCEDURE — 99213 OFFICE O/P EST LOW 20 MIN: CPT | Performed by: INTERNAL MEDICINE

## 2022-08-11 PROCEDURE — G8432 DEP SCR NOT DOC, RNG: HCPCS | Performed by: INTERNAL MEDICINE

## 2022-08-11 PROCEDURE — G8427 DOCREV CUR MEDS BY ELIG CLIN: HCPCS | Performed by: INTERNAL MEDICINE

## 2022-08-11 PROCEDURE — 3046F HEMOGLOBIN A1C LEVEL >9.0%: CPT | Performed by: INTERNAL MEDICINE

## 2022-08-11 PROCEDURE — G9899 SCRN MAM PERF RSLTS DOC: HCPCS | Performed by: INTERNAL MEDICINE

## 2022-08-11 PROCEDURE — G8754 DIAS BP LESS 90: HCPCS | Performed by: INTERNAL MEDICINE

## 2022-08-11 PROCEDURE — 1090F PRES/ABSN URINE INCON ASSESS: CPT | Performed by: INTERNAL MEDICINE

## 2022-08-11 PROCEDURE — 1123F ACP DISCUSS/DSCN MKR DOCD: CPT | Performed by: INTERNAL MEDICINE

## 2022-08-11 PROCEDURE — 2022F DILAT RTA XM EVC RTNOPTHY: CPT | Performed by: INTERNAL MEDICINE

## 2022-08-11 PROCEDURE — 3017F COLORECTAL CA SCREEN DOC REV: CPT | Performed by: INTERNAL MEDICINE

## 2022-08-11 PROCEDURE — G8536 NO DOC ELDER MAL SCRN: HCPCS | Performed by: INTERNAL MEDICINE

## 2022-08-11 PROCEDURE — 1101F PT FALLS ASSESS-DOCD LE1/YR: CPT | Performed by: INTERNAL MEDICINE

## 2022-08-11 PROCEDURE — G8417 CALC BMI ABV UP PARAM F/U: HCPCS | Performed by: INTERNAL MEDICINE

## 2022-08-11 PROCEDURE — G8400 PT W/DXA NO RESULTS DOC: HCPCS | Performed by: INTERNAL MEDICINE

## 2022-08-11 NOTE — PROGRESS NOTES
Jaimee Beaulieu is a 77 y.o.  female and presents with Hypertension, Diabetes, and Cholesterol Problem (F/u)      SUBJECTIVE:    Patient follows up for her azotemia. Renal function has improved with patient hydrating herself better than she was last time. She continues to be encouraged to cut back drinks or caffeine like sweet tea. She will continue to work on cutting back on the carbohydrates in her diet in order to lose weight and improve her diabetes. Respiratory ROS: negative for - shortness of breath  Cardiovascular ROS: negative for - chest pain    Current Outpatient Medications   Medication Sig    lisinopril-hydroCHLOROthiazide (PRINZIDE, ZESTORETIC) 10-12.5 mg per tablet TAKE 1 TABLET TWICE DAILY    fluticasone propionate (Flovent HFA) 44 mcg/actuation inhaler INHALE 2 PUFFS TWICE DAILY    insulin NPH/insulin regular (NovoLIN 70/30 U-100 Insulin) 100 unit/mL (70-30) injection INJECT 50 UNITS SUBCUTANEOUSLY TWO TIMES A DAY    mupirocin (BACTROBAN) 2 % ointment Apply  to affected area daily. albuterol (PROAIR HFA) 90 mcg/actuation inhaler Take 2 Puffs by inhalation every four (4) hours as needed for Wheezing. azelastine (ASTELIN) 137 mcg (0.1 %) nasal spray 2 Sprays by Both Nostrils route two (2) times a day. cpap machine kit by Does Not Apply route. 1  Overnight CPAP at 11 CWP with ramp humidifier. 2  Mask: Silva and Ramya Aristides or mask of choice. Supplies 99 month. Please send compliance data [id number]. Diagnosis VIKKI. AHI 17 RDI 18    COMBIGAN 0.2-0.5 % drop ophthalmic solution     calcium carb/vit D3/minerals (Calcium Carbonate-Vit D3-Min) 600 mg (1,500 mg)-400 unit chew Take 1 Tab by mouth daily. aspirin 81 mg Tab Take  by mouth daily. levomefolate-B6-meB12-algal oil (Metanx, algal oil,) 3 mg-35 mg-2 mg -90.314 mg cap Take 1 Capsule by mouth daily. (Patient not taking: No sig reported)     No current facility-administered medications for this visit.          OBJECTIVE:  alert, well appearing, and in no distress  Visit Vitals  BP (!) 126/44 (BP 1 Location: Right arm, BP Patient Position: Sitting, BP Cuff Size: Adult)   Pulse 91   Temp 97.6 °F (36.4 °C) (Temporal)   Resp 18   Ht 5' 5\" (1.651 m)   Wt 237 lb (107.5 kg)   SpO2 100%   BMI 39.44 kg/m²      well developed and well nourished          Labs:   Lab Results   Component Value Date/Time    Sodium 139 08/02/2022 11:44 AM    Potassium 4.9 08/02/2022 11:44 AM    Chloride 103 08/02/2022 11:44 AM    CO2 20 08/02/2022 11:44 AM    Anion gap 6 02/24/2020 09:12 AM    Glucose 95 08/02/2022 11:44 AM    BUN 42 (H) 08/02/2022 11:44 AM    Creatinine 1.20 (H) 08/02/2022 11:44 AM    BUN/Creatinine ratio 35 (H) 08/02/2022 11:44 AM    GFR est AA 57 (L) 02/04/2022 11:22 AM    GFR est non-AA 49 (L) 02/04/2022 11:22 AM    Calcium 9.8 08/02/2022 11:44 AM    Bilirubin, total 0.3 06/03/2022 10:10 AM    ALT (SGPT) 36 (H) 06/03/2022 10:10 AM    Alk. phosphatase 118 06/03/2022 10:10 AM    Protein, total 7.6 06/03/2022 10:10 AM    Albumin 4.3 06/03/2022 10:10 AM    Globulin 3.8 02/24/2020 09:12 AM    A-G Ratio 1.3 06/03/2022 10:10 AM        Discussed the patient's BMI with her. The BMI follow up plan is as follows: I have counseled this patient on diet and exercise regimens. Assessment/Plan      ICD-10-CM ICD-9-CM    1. Type 2 diabetes mellitus with stage 3a chronic kidney disease, with long-term current use of insulin (HCC)  E11.22 250.40 Will try to improve with cutting back starches and sugar in her diet to lose weight and decrease her BMI from 39. In the meantime she continues on Novolin 70/30 50 units twice daily. Patient unable to afford GLP-1. MICROALBUMIN, UR, RAND W/ MICROALB/CREAT RATIO    N18.31 585.3 HEMOGLOBIN A1C W/O EAG    Z79.4 V58.67 LIPID PANEL      2.  Essential hypertension  I10 401.9 Well-controlled on Zestoretic METABOLIC PANEL, COMPREHENSIVE        Follow-up and Dispositions    Return in about 3 months (around 11/11/2022) for labs 1 week before. Reviewed plan of care. Patient has provided input and agrees with goals.

## 2022-08-11 NOTE — PROGRESS NOTES
Patient is in the office today for a 5 week follow up. Do you have an Advance Directive no  Do you want more information : information given     1. \"Have you been to the ER, urgent care clinic since your last visit? Hospitalized since your last visit? \" No    2. \"Have you seen or consulted any other health care providers outside of the [address] [place] [address] since your last visit? \" No     3. For patients aged 39-70: Has the patient had a colonoscopy / FIT/ Cologuard? Yes - no Care Gap present      If the patient is female:    4. For patients aged 41-77: Has the patient had a mammogram within the past 2 years? Yes - no Care Gap present      5. For patients aged 21-65: Has the patient had a pap smear?  Yes - no Care Gap present

## 2022-11-03 ENCOUNTER — APPOINTMENT (OUTPATIENT)
Dept: INTERNAL MEDICINE CLINIC | Age: 67
End: 2022-11-03

## 2022-11-08 ENCOUNTER — TRANSCRIBE ORDER (OUTPATIENT)
Dept: SCHEDULING | Age: 67
End: 2022-11-08

## 2022-11-08 DIAGNOSIS — Z12.31 VISIT FOR SCREENING MAMMOGRAM: Primary | ICD-10-CM

## 2022-11-10 ENCOUNTER — OFFICE VISIT (OUTPATIENT)
Dept: INTERNAL MEDICINE CLINIC | Age: 67
End: 2022-11-10
Payer: MEDICARE

## 2022-11-10 VITALS
HEIGHT: 65 IN | BODY MASS INDEX: 38.39 KG/M2 | SYSTOLIC BLOOD PRESSURE: 125 MMHG | OXYGEN SATURATION: 99 % | DIASTOLIC BLOOD PRESSURE: 55 MMHG | HEART RATE: 82 BPM | RESPIRATION RATE: 14 BRPM | WEIGHT: 230.4 LBS | TEMPERATURE: 97.6 F

## 2022-11-10 DIAGNOSIS — E11.22 TYPE 2 DIABETES MELLITUS WITH STAGE 3A CHRONIC KIDNEY DISEASE, WITH LONG-TERM CURRENT USE OF INSULIN (HCC): Primary | ICD-10-CM

## 2022-11-10 DIAGNOSIS — E78.00 HYPERCHOLESTEROLEMIA: ICD-10-CM

## 2022-11-10 DIAGNOSIS — E66.01 CLASS 2 SEVERE OBESITY DUE TO EXCESS CALORIES WITH SERIOUS COMORBIDITY AND BODY MASS INDEX (BMI) OF 39.0 TO 39.9 IN ADULT (HCC): ICD-10-CM

## 2022-11-10 DIAGNOSIS — N18.31 TYPE 2 DIABETES MELLITUS WITH STAGE 3A CHRONIC KIDNEY DISEASE, WITH LONG-TERM CURRENT USE OF INSULIN (HCC): Primary | ICD-10-CM

## 2022-11-10 DIAGNOSIS — Z79.4 TYPE 2 DIABETES MELLITUS WITH STAGE 3A CHRONIC KIDNEY DISEASE, WITH LONG-TERM CURRENT USE OF INSULIN (HCC): Primary | ICD-10-CM

## 2022-11-10 DIAGNOSIS — I10 ESSENTIAL HYPERTENSION: ICD-10-CM

## 2022-11-10 PROCEDURE — G8427 DOCREV CUR MEDS BY ELIG CLIN: HCPCS | Performed by: INTERNAL MEDICINE

## 2022-11-10 PROCEDURE — 99214 OFFICE O/P EST MOD 30 MIN: CPT | Performed by: INTERNAL MEDICINE

## 2022-11-10 PROCEDURE — 1123F ACP DISCUSS/DSCN MKR DOCD: CPT | Performed by: INTERNAL MEDICINE

## 2022-11-10 PROCEDURE — 3017F COLORECTAL CA SCREEN DOC REV: CPT | Performed by: INTERNAL MEDICINE

## 2022-11-10 PROCEDURE — G8536 NO DOC ELDER MAL SCRN: HCPCS | Performed by: INTERNAL MEDICINE

## 2022-11-10 PROCEDURE — G8510 SCR DEP NEG, NO PLAN REQD: HCPCS | Performed by: INTERNAL MEDICINE

## 2022-11-10 PROCEDURE — G8417 CALC BMI ABV UP PARAM F/U: HCPCS | Performed by: INTERNAL MEDICINE

## 2022-11-10 PROCEDURE — 3078F DIAST BP <80 MM HG: CPT | Performed by: INTERNAL MEDICINE

## 2022-11-10 PROCEDURE — 3052F HG A1C>EQUAL 8.0%<EQUAL 9.0%: CPT | Performed by: INTERNAL MEDICINE

## 2022-11-10 PROCEDURE — G8752 SYS BP LESS 140: HCPCS | Performed by: INTERNAL MEDICINE

## 2022-11-10 PROCEDURE — 1101F PT FALLS ASSESS-DOCD LE1/YR: CPT | Performed by: INTERNAL MEDICINE

## 2022-11-10 PROCEDURE — G9899 SCRN MAM PERF RSLTS DOC: HCPCS | Performed by: INTERNAL MEDICINE

## 2022-11-10 PROCEDURE — 3074F SYST BP LT 130 MM HG: CPT | Performed by: INTERNAL MEDICINE

## 2022-11-10 PROCEDURE — 2022F DILAT RTA XM EVC RTNOPTHY: CPT | Performed by: INTERNAL MEDICINE

## 2022-11-10 PROCEDURE — G8754 DIAS BP LESS 90: HCPCS | Performed by: INTERNAL MEDICINE

## 2022-11-10 PROCEDURE — G8400 PT W/DXA NO RESULTS DOC: HCPCS | Performed by: INTERNAL MEDICINE

## 2022-11-10 PROCEDURE — 1090F PRES/ABSN URINE INCON ASSESS: CPT | Performed by: INTERNAL MEDICINE

## 2022-11-10 NOTE — PROGRESS NOTES
Chief Complaint   Patient presents with    Diabetes     3 month f/u    Labs     Completed 11/3/2022     1. \"Have you been to the ER, urgent care clinic since your last visit? Hospitalized since your last visit? \" No    2. \"Have you seen or consulted any other health care providers outside of the [address] [place] [address] since your last visit? \" No     3. For patients aged 39-70: Has the patient had a colonoscopy / FIT/ Cologuard? Yes - no Care Gap present      If the patient is female:    4. For patients aged 41-77: Has the patient had a mammogram within the past 2 years? Yes - no Care Gap present      5. For patients aged 21-65: Has the patient had a pap smear?  NA - based on age or sex

## 2022-11-10 NOTE — PROGRESS NOTES
Subjective:       Chief Complaint  The patient presents for follow up of diabetes, hypertension and high cholesterol. HPI  Jessica Ennis is a 79 y.o. female seen for follow up of diabetes. Shealso has hypertension and hyperlipidemia. Diabetes uncontrolled  on novolog 70/30  Insulin 50 units QAM and 50 units QPM, pt no longer on  Victoza 1.8 pt and is no longer seeing Endo(Dr Schuster),  Endo had recommended an insulin pump but pt was reluctant to do it, patient could not afford the GLP-1. Patient now has developed chronic kidney disease stage III , hypertension well controlled, no significant medication side effects noted, on Zestoretic, hyperlipidemia poorly controlled, off crestor which she stopped due to myalgias. She may benefit from [address] [place] [address] in the future. Pt has sleep apnea but does not use CPAP machine. Diet and Lifestyle: not attempting to follow a low fat, low cholesterol diet, does not rigorously follow a diabetic diet, exercises sporadically    Home BP Monitoring: is not measured at home. Diabetic Review of Systems - home glucose monitoring: is performed regularly, 3x/day. Other symptoms and concerns: pt needs to work on losing weight to improve her overall health her BMI of 38 puts her at increased risk of multiple medical problems    Vit D deficiency is well controlled on current supplementation. Pt continues to have knee pain due to arthritis. Asthma is well controlled on Flovent     Pt has had h/o uterine cancer and was followed by Dr Blayne Gonzalez. She will set up follow up with Dr Jamila Wayne in the future.        Current Outpatient Medications   Medication Sig    lisinopril-hydroCHLOROthiazide (PRINZIDE, ZESTORETIC) 10-12.5 mg per tablet TAKE 1 TABLET TWICE DAILY    fluticasone propionate (Flovent HFA) 44 mcg/actuation inhaler INHALE 2 PUFFS TWICE DAILY    insulin NPH/insulin regular (NovoLIN 70/30 U-100 Insulin) 100 unit/mL (70-30) injection INJECT 50 UNITS SUBCUTANEOUSLY TWO TIMES A DAY    mupirocin (BACTROBAN) 2 % ointment Apply  to affected area daily. albuterol (PROAIR HFA) 90 mcg/actuation inhaler Take 2 Puffs by inhalation every four (4) hours as needed for Wheezing. azelastine (ASTELIN) 137 mcg (0.1 %) nasal spray 2 Sprays by Both Nostrils route two (2) times a day. COMBIGAN 0.2-0.5 % drop ophthalmic solution     calcium carb/vit D3/minerals (Calcium Carbonate-Vit D3-Min) 600 mg (1,500 mg)-400 unit chew Take 1 Tab by mouth daily. aspirin 81 mg Tab Take  by mouth daily. levomefolate-B6-meB12-algal oil (Metanx, algal oil,) 3 mg-35 mg-2 mg -90.314 mg cap Take 1 Capsule by mouth daily. (Patient not taking: No sig reported)    cpap machine kit by Does Not Apply route. 1  Overnight CPAP at 11 CWP with ramp humidifier. 2  Mask: Silva and Ramya Aristides or mask of choice. Supplies 99 month. Please send compliance data [id number]. Diagnosis VIKKI. AHI 17 RDI 18 (Patient not taking: Reported on 11/10/2022)     No current facility-administered medications for this visit.              Review of Systems  Respiratory: negative for dyspnea on exertion  Cardiovascular: negative for chest pain    Objective:     Visit Vitals  BP (!) 125/55   Pulse 82   Temp 97.6 °F (36.4 °C) (Temporal)   Resp 14   Ht 5' 5\" (1.651 m)   Wt 230 lb 6.4 oz (104.5 kg)   SpO2 99%   BMI 38.34 kg/m²        General appearance - alert, well appearing, and in no distress  Chest - clear to auscultation, no wheezes, rales or rhonchi, symmetric air entry  Heart - normal rate, regular rhythm, normal S1, S2, no murmurs, rubs, clicks or gallops  Extremities - peripheral pulses normal, no pedal edema, no clubbing or cyanosis      Labs:   Lab Results   Component Value Date/Time    Hemoglobin A1c 8.4 (H) 11/03/2022 11:30 AM    Hemoglobin A1c 9.0 (H) 06/03/2022 10:10 AM    Hemoglobin A1c 8.1 (H) 02/04/2022 11:22 AM    Hemoglobin A1c, External 8.8 01/08/2019 12:00 AM    Hemoglobin A1c, External 10.1 % 05/23/2017 12:00 AM    Hemoglobin A1c, External 10.3 11/03/2016 12:00 AM    Glucose 107 (H) 11/03/2022 11:30 AM    Microalbumin/Creat ratio (mg/g creat) 28 10/25/2019 08:20 AM    Microalb/Creat ratio (ug/mg creat.) 12 11/03/2022 11:30 AM    Microalbumin,urine random 3.25 (H) 10/25/2019 08:20 AM    LDL, calculated 127 (H) 11/03/2022 11:30 AM    LDL, calculated 143.6 (H) 02/24/2020 09:12 AM    Creatinine, POC 1.5 (H) 01/07/2015 09:31 AM    Creatinine 1.16 (H) 11/03/2022 11:30 AM      Lab Results   Component Value Date/Time    Cholesterol, total 210 (H) 11/03/2022 11:30 AM    HDL Cholesterol 65 11/03/2022 11:30 AM    LDL, calculated 127 (H) 11/03/2022 11:30 AM    LDL, calculated 143.6 (H) 02/24/2020 09:12 AM    Triglyceride 100 11/03/2022 11:30 AM    CHOL/HDL Ratio 3.6 02/24/2020 09:12 AM       Lab Results   Component Value Date/Time    Sodium 142 11/03/2022 11:30 AM    Potassium 4.6 11/03/2022 11:30 AM    Chloride 104 11/03/2022 11:30 AM    CO2 21 11/03/2022 11:30 AM    Anion gap 6 02/24/2020 09:12 AM    Glucose 107 (H) 11/03/2022 11:30 AM    BUN 28 (H) 11/03/2022 11:30 AM    Creatinine 1.16 (H) 11/03/2022 11:30 AM    BUN/Creatinine ratio 24 11/03/2022 11:30 AM    GFR est AA 57 (L) 02/04/2022 11:22 AM    GFR est non-AA 49 (L) 02/04/2022 11:22 AM    Calcium 9.7 11/03/2022 11:30 AM    Bilirubin, total 0.2 11/03/2022 11:30 AM    ALT (SGPT) 36 (H) 11/03/2022 11:30 AM    Alk. phosphatase 133 (H) 11/03/2022 11:30 AM    Protein, total 7.1 11/03/2022 11:30 AM    Albumin 4.0 11/03/2022 11:30 AM    Globulin 3.8 02/24/2020 09:12 AM    A-G Ratio 1.3 11/03/2022 11:30 AM        Labs:   Lab Results   Component Value Date/Time    Hemoglobin A1c 8.4 (H) 11/03/2022 11:30 AM    Hemoglobin A1c (POC) 8.2 06/03/2021 10:38 AM    Hemoglobin A1c, External 8.8 01/08/2019 12:00 AM            Assessment / Plan     Diabetes uncontrolled on Novolin 70/30 50 units twice daily. Patient no longer seeing endocrine and has declined insulin pump. GLP-1 too expensive for her. Currently not a lot of options for patient except intense weight loss or increasing insulin further which will cause more weight gain. Hypertension well controlled, on Zestoretic. Hyperlipidemia uncontrolled  patient unable to tolerate statins due to myalgias. ICD-10-CM ICD-9-CM    1. Type 2 diabetes mellitus with stage 3a chronic kidney disease, with long-term current use of insulin ([facility])  E11.22 250.40 HEMOGLOBIN A1C W/O EAG    N18.31 585. 3     Z79.4 V58.67       2. Essential hypertension  J89 925.4 METABOLIC PANEL, COMPREHENSIVE      3. Hypercholesterolemia  E78.00 272.0 LIPID PANEL      4. Class 2 severe obesity due to excess calories with serious comorbidity and body mass index (BMI) of 39.0 to 39.9 in adult [facility])  E66.01 278.01 Patient will continue to work on trying to lose weight. She may be a candidate for medical weight loss program if she is unable to to lose weight on her own. Z68.39 V85.39                        Diabetic issues reviewed with her: diabetic diet discussed in detail, and low cholesterol diet, weight control and daily exercise discussed. Follow-up and Dispositions    Return in about 4 months (around 3/10/2023) for labs 1 week before. Reviewed plan of care. Patient has provided input and agrees with goals.

## 2022-12-01 ENCOUNTER — HOSPITAL ENCOUNTER (OUTPATIENT)
Dept: MAMMOGRAPHY | Age: 67
Discharge: HOME | End: 2022-12-01
Attending: INTERNAL MEDICINE
Payer: MEDICARE

## 2022-12-01 DIAGNOSIS — Z12.31 VISIT FOR SCREENING MAMMOGRAM: ICD-10-CM

## 2022-12-01 PROCEDURE — 77063 BREAST TOMOSYNTHESIS BI: CPT

## 2022-12-23 DIAGNOSIS — J45.30 MILD PERSISTENT ASTHMA WITHOUT COMPLICATION: ICD-10-CM

## 2022-12-23 RX ORDER — FLUTICASONE PROPIONATE 44 UG/1
AEROSOL, METERED RESPIRATORY (INHALATION)
Qty: 3 EACH | Refills: 3 | Status: SHIPPED | OUTPATIENT
Start: 2022-12-23

## 2022-12-23 NOTE — TELEPHONE ENCOUNTER
Requested Prescriptions     Pending Prescriptions Disp Refills    fluticasone propionate (Flovent HFA) 44 mcg/actuation inhaler 3 Each 3     Sig: INHALE 2 PUFFS TWICE DAILY

## 2023-02-04 DIAGNOSIS — N18.31 TYPE 2 DIABETES MELLITUS WITH STAGE 3A CHRONIC KIDNEY DISEASE, WITH LONG-TERM CURRENT USE OF INSULIN (HCC): Primary | ICD-10-CM

## 2023-02-04 DIAGNOSIS — I10 ESSENTIAL HYPERTENSION: Primary | ICD-10-CM

## 2023-02-04 DIAGNOSIS — E11.22 TYPE 2 DIABETES MELLITUS WITH STAGE 3A CHRONIC KIDNEY DISEASE, WITH LONG-TERM CURRENT USE OF INSULIN (HCC): Primary | ICD-10-CM

## 2023-02-04 DIAGNOSIS — Z79.4 TYPE 2 DIABETES MELLITUS WITH STAGE 3A CHRONIC KIDNEY DISEASE, WITH LONG-TERM CURRENT USE OF INSULIN (HCC): Primary | ICD-10-CM

## 2023-02-05 DIAGNOSIS — E78.00 HYPERCHOLESTEROLEMIA: Primary | ICD-10-CM

## 2023-03-18 LAB
ALBUMIN SERPL-MCNC: 4.4 G/DL (ref 3.8–4.8)
ALBUMIN/GLOB SERPL: 1.4 {RATIO} (ref 1.2–2.2)
ALP SERPL-CCNC: 135 IU/L (ref 44–121)
ALT SERPL-CCNC: 26 IU/L (ref 0–32)
AST SERPL-CCNC: 34 IU/L (ref 0–40)
BILIRUB SERPL-MCNC: 0.2 MG/DL (ref 0–1.2)
BUN SERPL-MCNC: 32 MG/DL (ref 8–27)
BUN/CREAT SERPL: 26 (ref 12–28)
CALCIUM SERPL-MCNC: 10 MG/DL (ref 8.7–10.3)
CHLORIDE SERPL-SCNC: 101 MMOL/L (ref 96–106)
CHOLEST SERPL-MCNC: 259 MG/DL (ref 100–199)
CO2 SERPL-SCNC: 23 MMOL/L (ref 20–29)
CREAT SERPL-MCNC: 1.24 MG/DL (ref 0.57–1)
EGFRCR SERPLBLD CKD-EPI 2021: 48 ML/MIN/1.73
GLOBULIN SER CALC-MCNC: 3.2 G/DL (ref 1.5–4.5)
GLUCOSE SERPL-MCNC: 226 MG/DL (ref 70–99)
HBA1C MFR BLD: 11 % (ref 4.8–5.6)
HDLC SERPL-MCNC: 80 MG/DL
LDLC SERPL CALC-MCNC: 165 MG/DL (ref 0–99)
POTASSIUM SERPL-SCNC: 5.3 MMOL/L (ref 3.5–5.2)
PROT SERPL-MCNC: 7.6 G/DL (ref 6–8.5)
SODIUM SERPL-SCNC: 143 MMOL/L (ref 134–144)
SPECIMEN STATUS REPORT: NORMAL
TRIGL SERPL-MCNC: 84 MG/DL (ref 0–149)
VLDLC SERPL CALC-MCNC: 14 MG/DL (ref 5–40)

## 2023-03-29 ENCOUNTER — OFFICE VISIT (OUTPATIENT)
Age: 68
End: 2023-03-29
Payer: MEDICARE

## 2023-03-29 VITALS
TEMPERATURE: 97.1 F | BODY MASS INDEX: 36.49 KG/M2 | OXYGEN SATURATION: 99 % | HEIGHT: 65 IN | RESPIRATION RATE: 20 BRPM | SYSTOLIC BLOOD PRESSURE: 130 MMHG | WEIGHT: 219 LBS | HEART RATE: 67 BPM | DIASTOLIC BLOOD PRESSURE: 55 MMHG

## 2023-03-29 DIAGNOSIS — I10 ESSENTIAL (PRIMARY) HYPERTENSION: ICD-10-CM

## 2023-03-29 DIAGNOSIS — E11.22 TYPE 2 DIABETES MELLITUS WITH STAGE 3B CHRONIC KIDNEY DISEASE, WITH LONG-TERM CURRENT USE OF INSULIN (HCC): Primary | ICD-10-CM

## 2023-03-29 DIAGNOSIS — T46.6X5A MYALGIA DUE TO STATIN: ICD-10-CM

## 2023-03-29 DIAGNOSIS — N18.32 TYPE 2 DIABETES MELLITUS WITH STAGE 3B CHRONIC KIDNEY DISEASE, WITH LONG-TERM CURRENT USE OF INSULIN (HCC): Primary | ICD-10-CM

## 2023-03-29 DIAGNOSIS — E66.01 SEVERE OBESITY (BMI 35.0-39.9) WITH COMORBIDITY (HCC): ICD-10-CM

## 2023-03-29 DIAGNOSIS — Z79.4 TYPE 2 DIABETES MELLITUS WITH STAGE 3B CHRONIC KIDNEY DISEASE, WITH LONG-TERM CURRENT USE OF INSULIN (HCC): Primary | ICD-10-CM

## 2023-03-29 DIAGNOSIS — E78.00 PURE HYPERCHOLESTEROLEMIA, UNSPECIFIED: ICD-10-CM

## 2023-03-29 DIAGNOSIS — M79.10 MYALGIA DUE TO STATIN: ICD-10-CM

## 2023-03-29 PROCEDURE — 99211 OFF/OP EST MAY X REQ PHY/QHP: CPT | Performed by: INTERNAL MEDICINE

## 2023-03-29 PROCEDURE — G8483 FLU IMM NO ADMIN DOC REA: HCPCS | Performed by: INTERNAL MEDICINE

## 2023-03-29 PROCEDURE — G8427 DOCREV CUR MEDS BY ELIG CLIN: HCPCS | Performed by: INTERNAL MEDICINE

## 2023-03-29 PROCEDURE — 3046F HEMOGLOBIN A1C LEVEL >9.0%: CPT | Performed by: INTERNAL MEDICINE

## 2023-03-29 PROCEDURE — 2022F DILAT RTA XM EVC RTNOPTHY: CPT | Performed by: INTERNAL MEDICINE

## 2023-03-29 PROCEDURE — 3074F SYST BP LT 130 MM HG: CPT | Performed by: INTERNAL MEDICINE

## 2023-03-29 PROCEDURE — 3078F DIAST BP <80 MM HG: CPT | Performed by: INTERNAL MEDICINE

## 2023-03-29 PROCEDURE — 1090F PRES/ABSN URINE INCON ASSESS: CPT | Performed by: INTERNAL MEDICINE

## 2023-03-29 PROCEDURE — 1123F ACP DISCUSS/DSCN MKR DOCD: CPT | Performed by: INTERNAL MEDICINE

## 2023-03-29 PROCEDURE — 1036F TOBACCO NON-USER: CPT | Performed by: INTERNAL MEDICINE

## 2023-03-29 PROCEDURE — 99214 OFFICE O/P EST MOD 30 MIN: CPT | Performed by: INTERNAL MEDICINE

## 2023-03-29 PROCEDURE — 3017F COLORECTAL CA SCREEN DOC REV: CPT | Performed by: INTERNAL MEDICINE

## 2023-03-29 PROCEDURE — G8400 PT W/DXA NO RESULTS DOC: HCPCS | Performed by: INTERNAL MEDICINE

## 2023-03-29 PROCEDURE — G8417 CALC BMI ABV UP PARAM F/U: HCPCS | Performed by: INTERNAL MEDICINE

## 2023-03-29 SDOH — ECONOMIC STABILITY: FOOD INSECURITY: WITHIN THE PAST 12 MONTHS, THE FOOD YOU BOUGHT JUST DIDN'T LAST AND YOU DIDN'T HAVE MONEY TO GET MORE.: PATIENT DECLINED

## 2023-03-29 SDOH — ECONOMIC STABILITY: HOUSING INSECURITY
IN THE LAST 12 MONTHS, WAS THERE A TIME WHEN YOU DID NOT HAVE A STEADY PLACE TO SLEEP OR SLEPT IN A SHELTER (INCLUDING NOW)?: PATIENT REFUSED

## 2023-03-29 SDOH — ECONOMIC STABILITY: FOOD INSECURITY: WITHIN THE PAST 12 MONTHS, YOU WORRIED THAT YOUR FOOD WOULD RUN OUT BEFORE YOU GOT MONEY TO BUY MORE.: PATIENT DECLINED

## 2023-03-29 SDOH — ECONOMIC STABILITY: INCOME INSECURITY: HOW HARD IS IT FOR YOU TO PAY FOR THE VERY BASICS LIKE FOOD, HOUSING, MEDICAL CARE, AND HEATING?: PATIENT DECLINED

## 2023-03-29 ASSESSMENT — PATIENT HEALTH QUESTIONNAIRE - PHQ9
SUM OF ALL RESPONSES TO PHQ9 QUESTIONS 1 & 2: 0
2. FEELING DOWN, DEPRESSED OR HOPELESS: 0
SUM OF ALL RESPONSES TO PHQ QUESTIONS 1-9: 0
SUM OF ALL RESPONSES TO PHQ QUESTIONS 1-9: 0
1. LITTLE INTEREST OR PLEASURE IN DOING THINGS: 0
SUM OF ALL RESPONSES TO PHQ QUESTIONS 1-9: 0
SUM OF ALL RESPONSES TO PHQ QUESTIONS 1-9: 0

## 2023-03-29 NOTE — PROGRESS NOTES
Jocelyne Mcfarland presents today for   Chief Complaint   Patient presents with    Diabetes    Hypertension    Cholesterol Problem     4 month follow up         1. \"Have you been to the ER, urgent care clinic since your last visit? Hospitalized since your last visit? \" no    2. \"Have you seen or consulted any other health care providers outside of the [address] [place] [address] since your last visit? \" no     3. For patients aged 39-70: Has the patient had a colonoscopy / FIT/ Cologuard? Yes - no Care Gap present      If the patient is female:    4. For patients aged 41-77: Has the patient had a mammogram within the past 2 years? Yes - no Care Gap present      5. For patients aged 21-65: Has the patient had a pap smear?  Yes - no Care Gap present
08/02/2022 11:44 AM     CMP:    Lab Results   Component Value Date/Time     03/17/2023 12:00 AM    K 5.3 03/17/2023 12:00 AM     03/17/2023 12:00 AM    CO2 23 03/17/2023 12:00 AM    BUN 32 03/17/2023 12:00 AM    CREATININE 1.24 03/17/2023 12:00 AM    GFRAA 57 02/04/2022 11:22 AM    AGRATIO 1.4 03/17/2023 12:00 AM    LABGLOM 48 03/17/2023 12:00 AM    GLUCOSE 226 03/17/2023 12:00 AM    PROT 7.6 03/17/2023 12:00 AM    LABALBU 4.4 03/17/2023 12:00 AM    CALCIUM 10.0 03/17/2023 12:00 AM    BILITOT 0.2 03/17/2023 12:00 AM    ALKPHOS 135 03/17/2023 12:00 AM    AST 34 03/17/2023 12:00 AM    ALT 26 03/17/2023 12:00 AM     HgBA1c:    Lab Results   Component Value Date/Time    LABA1C 11.0 03/17/2023 12:00 AM     FLP:    Lab Results   Component Value Date/Time    TRIG 84 03/17/2023 12:00 AM    HDL 80 03/17/2023 12:00 AM    LDLCALC 165 03/17/2023 12:00 AM    LABVLDL 14 03/17/2023 12:00 AM           Assessment / Plan     Diabetes uncontrolled on Novolin 70/30 50 units twice daily. Patient no longer seeing endocrine and has declined insulin pump. GLP-1 too expensive for her. Currently not a lot of options for patient except intense weight loss or increasing insulin further which will cause more weight gain. Would refer to medical weight loss program and PharmD in future if needed  Hypertension well controlled, on Zestoretic. Hyperlipidemia uncontrolled  patient unable to tolerate statins due to myalgias. ICD-10-CM    1. Type 2 diabetes mellitus with stage 3b chronic kidney disease, with long-term current use of insulin (HCC)  E11.22 Hemoglobin A1C    N18.32     Z79.4       2. Essential (primary) hypertension  I10 Comprehensive Metabolic Panel      3. Pure hypercholesterolemia, unspecified  E78.00 Lipid Panel      4. Severe obesity (BMI 35.0-39. 9) with comorbidity ([facility] [address]  E66.01 Consider medical weight loss program.      5. Myalgia due to statin  M79.10 Patient unable to tolerate statins and benefit from

## 2023-06-05 ENCOUNTER — TELEPHONE (OUTPATIENT)
Age: 68
End: 2023-06-05

## 2023-06-05 NOTE — TELEPHONE ENCOUNTER
Received message from [facility] ([email or web address]) that Elizabeth with [address] called to verify if we received a fax, attempted to call them back at number provided ([phone]) to get more information, unable to get through.

## 2023-06-06 NOTE — TELEPHONE ENCOUNTER
[facility]    If it was regarding a fax for medical records that was received on 5/8/23 , they need to contact [facility].

## 2023-06-30 ENCOUNTER — NURSE ONLY (OUTPATIENT)
Age: 68
End: 2023-06-30

## 2023-06-30 DIAGNOSIS — I10 ESSENTIAL (PRIMARY) HYPERTENSION: ICD-10-CM

## 2023-06-30 DIAGNOSIS — N18.32 TYPE 2 DIABETES MELLITUS WITH STAGE 3B CHRONIC KIDNEY DISEASE, WITH LONG-TERM CURRENT USE OF INSULIN (HCC): ICD-10-CM

## 2023-06-30 DIAGNOSIS — Z79.4 TYPE 2 DIABETES MELLITUS WITH STAGE 3B CHRONIC KIDNEY DISEASE, WITH LONG-TERM CURRENT USE OF INSULIN (HCC): ICD-10-CM

## 2023-06-30 DIAGNOSIS — E11.22 TYPE 2 DIABETES MELLITUS WITH STAGE 3B CHRONIC KIDNEY DISEASE, WITH LONG-TERM CURRENT USE OF INSULIN (HCC): ICD-10-CM

## 2023-06-30 DIAGNOSIS — E78.00 PURE HYPERCHOLESTEROLEMIA, UNSPECIFIED: ICD-10-CM

## 2023-07-01 LAB
ALBUMIN SERPL-MCNC: 4.1 G/DL (ref 3.8–4.8)
ALBUMIN/GLOB SERPL: 1.2 {RATIO} (ref 1.2–2.2)
ALP SERPL-CCNC: 120 IU/L (ref 44–121)
ALT SERPL-CCNC: 19 IU/L (ref 0–32)
AST SERPL-CCNC: 22 IU/L (ref 0–40)
BILIRUB SERPL-MCNC: 0.3 MG/DL (ref 0–1.2)
BUN SERPL-MCNC: 26 MG/DL (ref 8–27)
BUN/CREAT SERPL: 23 (ref 12–28)
CALCIUM SERPL-MCNC: 9.9 MG/DL (ref 8.7–10.3)
CHLORIDE SERPL-SCNC: 99 MMOL/L (ref 96–106)
CHOLEST SERPL-MCNC: 245 MG/DL (ref 100–199)
CO2 SERPL-SCNC: 24 MMOL/L (ref 20–29)
CREAT SERPL-MCNC: 1.15 MG/DL (ref 0.57–1)
EGFRCR SERPLBLD CKD-EPI 2021: 52 ML/MIN/1.73
GLOBULIN SER CALC-MCNC: 3.4 G/DL (ref 1.5–4.5)
GLUCOSE SERPL-MCNC: 149 MG/DL (ref 70–99)
HBA1C MFR BLD: 9.8 % (ref 4.8–5.6)
HDLC SERPL-MCNC: 76 MG/DL
LDLC SERPL CALC-MCNC: 158 MG/DL (ref 0–99)
POTASSIUM SERPL-SCNC: 4.8 MMOL/L (ref 3.5–5.2)
PROT SERPL-MCNC: 7.5 G/DL (ref 6–8.5)
SODIUM SERPL-SCNC: 138 MMOL/L (ref 134–144)
SPECIMEN STATUS REPORT: NORMAL
TRIGL SERPL-MCNC: 67 MG/DL (ref 0–149)
VLDLC SERPL CALC-MCNC: 11 MG/DL (ref 5–40)

## 2023-07-07 ENCOUNTER — OFFICE VISIT (OUTPATIENT)
Age: 68
End: 2023-07-07
Payer: MEDICARE

## 2023-07-07 VITALS
RESPIRATION RATE: 18 BRPM | BODY MASS INDEX: 37.15 KG/M2 | DIASTOLIC BLOOD PRESSURE: 55 MMHG | TEMPERATURE: 98.2 F | OXYGEN SATURATION: 97 % | HEART RATE: 74 BPM | WEIGHT: 223 LBS | HEIGHT: 65 IN | SYSTOLIC BLOOD PRESSURE: 137 MMHG

## 2023-07-07 DIAGNOSIS — E66.01 SEVERE OBESITY (BMI 35.0-39.9) WITH COMORBIDITY (HCC): ICD-10-CM

## 2023-07-07 DIAGNOSIS — N18.31 TYPE 2 DIABETES MELLITUS WITH STAGE 3A CHRONIC KIDNEY DISEASE, WITH LONG-TERM CURRENT USE OF INSULIN (HCC): ICD-10-CM

## 2023-07-07 DIAGNOSIS — Z00.00 MEDICARE ANNUAL WELLNESS VISIT, SUBSEQUENT: Primary | ICD-10-CM

## 2023-07-07 DIAGNOSIS — I10 ESSENTIAL (PRIMARY) HYPERTENSION: ICD-10-CM

## 2023-07-07 DIAGNOSIS — Z79.4 TYPE 2 DIABETES MELLITUS WITH STAGE 3A CHRONIC KIDNEY DISEASE, WITH LONG-TERM CURRENT USE OF INSULIN (HCC): ICD-10-CM

## 2023-07-07 DIAGNOSIS — E78.00 PURE HYPERCHOLESTEROLEMIA, UNSPECIFIED: ICD-10-CM

## 2023-07-07 DIAGNOSIS — E11.22 TYPE 2 DIABETES MELLITUS WITH STAGE 3A CHRONIC KIDNEY DISEASE, WITH LONG-TERM CURRENT USE OF INSULIN (HCC): ICD-10-CM

## 2023-07-07 PROCEDURE — 3074F SYST BP LT 130 MM HG: CPT | Performed by: INTERNAL MEDICINE

## 2023-07-07 PROCEDURE — 1123F ACP DISCUSS/DSCN MKR DOCD: CPT | Performed by: INTERNAL MEDICINE

## 2023-07-07 PROCEDURE — G0439 PPPS, SUBSEQ VISIT: HCPCS | Performed by: INTERNAL MEDICINE

## 2023-07-07 PROCEDURE — 3078F DIAST BP <80 MM HG: CPT | Performed by: INTERNAL MEDICINE

## 2023-07-07 PROCEDURE — 99211 OFF/OP EST MAY X REQ PHY/QHP: CPT | Performed by: INTERNAL MEDICINE

## 2023-07-07 PROCEDURE — 3046F HEMOGLOBIN A1C LEVEL >9.0%: CPT | Performed by: INTERNAL MEDICINE

## 2023-07-07 PROCEDURE — 99214 OFFICE O/P EST MOD 30 MIN: CPT | Performed by: INTERNAL MEDICINE

## 2023-07-07 ASSESSMENT — PATIENT HEALTH QUESTIONNAIRE - PHQ9
2. FEELING DOWN, DEPRESSED OR HOPELESS: 0
SUM OF ALL RESPONSES TO PHQ QUESTIONS 1-9: 0
SUM OF ALL RESPONSES TO PHQ QUESTIONS 1-9: 0
1. LITTLE INTEREST OR PLEASURE IN DOING THINGS: 0
SUM OF ALL RESPONSES TO PHQ9 QUESTIONS 1 & 2: 0
SUM OF ALL RESPONSES TO PHQ QUESTIONS 1-9: 0
SUM OF ALL RESPONSES TO PHQ QUESTIONS 1-9: 0

## 2023-07-07 ASSESSMENT — LIFESTYLE VARIABLES
HOW MANY STANDARD DRINKS CONTAINING ALCOHOL DO YOU HAVE ON A TYPICAL DAY: PATIENT DOES NOT DRINK
HOW OFTEN DO YOU HAVE A DRINK CONTAINING ALCOHOL: NEVER

## 2023-07-10 NOTE — PROGRESS NOTES
Subjective:       Chief Complaint  The patient presents for follow up of diabetes, hypertension and high cholesterol. NEY Holder is a 79 y.o. female seen for follow up of diabetes. Shealso has hypertension and hyperlipidemia. Diabetes uncontrolled  on novolog 70/30  Insulin 50 units QAM and 50 units QPM, pt no longer on  Victoza 1.8 pt and is no longer seeing Endo(Dr Aguirre),  Endo had recommended an insulin pump but pt was reluctant to do it, patient could not afford the GLP-1. Patient now has developed chronic kidney disease stage IIIa , patient will benefit from SGLT2 but cost has been an issue in the past with medications hypertension borderline controlled, no significant medication side effects noted, on Zestoretic, hyperlipidemia poorly controlled, off crestor which she stopped due to myalgias. She may benefit from [address] [place] [address] in the future. Pt has sleep apnea but does not use CPAP machine. Diet and Lifestyle: not attempting to follow a low fat, low cholesterol diet, does not rigorously follow a diabetic diet, exercises sporadically    Home BP Monitoring: is not measured at home. Diabetic Review of Systems - home glucose monitoring: is performed regularly, 3x/day. Other symptoms and concerns: pt needs to work on losing weight to improve her overall health her BMI of 37 puts her at increased risk of multiple medical problems    Vit D deficiency is well controlled on current supplementation. Pt continues to have knee pain due to arthritis. Asthma is well controlled on Flovent     Pt has had h/o uterine cancer and was followed by Dr Yadiel De La Cruz. She will set up follow up with Dr Valery Arguello in the future.        Current Outpatient Medications   Medication Sig    albuterol sulfate HFA (PROVENTIL;VENTOLIN;PROAIR) 108 (90 Base) MCG/ACT inhaler Inhale 2 puffs into the lungs every 4 hours as needed    azelastine (ASTELIN) 0.1 % nasal spray 2 sprays by Nasal route 2 times daily

## 2023-08-10 ENCOUNTER — TELEPHONE (OUTPATIENT)
Age: 68
End: 2023-08-10

## 2023-08-10 NOTE — TELEPHONE ENCOUNTER
[address] [place] [address]
Per last office note, patient needs appointment with Pharm D     Return in about 3 months (around 10/7/2023) for labs 1 week before needs appt with Pharm D, labs 1 week before.
breast and formula
breast and formula

## 2023-10-30 ENCOUNTER — TELEPHONE (OUTPATIENT)
Age: 68
End: 2023-10-30

## 2023-10-30 NOTE — TELEPHONE ENCOUNTER
Please change expected date for labs.  Pt will be getting done tomorrow at [phone] w/ follow up appt 11/7

## 2023-10-31 ENCOUNTER — HOSPITAL ENCOUNTER (OUTPATIENT)
Facility: HOSPITAL | Age: 68
Setting detail: SPECIMEN
Discharge: HOME | End: 2023-11-03
Payer: MEDICARE

## 2023-10-31 DIAGNOSIS — Z79.4 TYPE 2 DIABETES MELLITUS WITH STAGE 3A CHRONIC KIDNEY DISEASE, WITH LONG-TERM CURRENT USE OF INSULIN (HCC): ICD-10-CM

## 2023-10-31 DIAGNOSIS — N18.31 TYPE 2 DIABETES MELLITUS WITH STAGE 3A CHRONIC KIDNEY DISEASE, WITH LONG-TERM CURRENT USE OF INSULIN (HCC): ICD-10-CM

## 2023-10-31 DIAGNOSIS — E11.22 TYPE 2 DIABETES MELLITUS WITH STAGE 3A CHRONIC KIDNEY DISEASE, WITH LONG-TERM CURRENT USE OF INSULIN (HCC): ICD-10-CM

## 2023-10-31 DIAGNOSIS — I10 ESSENTIAL (PRIMARY) HYPERTENSION: ICD-10-CM

## 2023-10-31 DIAGNOSIS — E78.00 PURE HYPERCHOLESTEROLEMIA, UNSPECIFIED: ICD-10-CM

## 2023-10-31 LAB
ALBUMIN SERPL-MCNC: 3.8 G/DL (ref 3.4–5)
ALBUMIN/GLOB SERPL: 0.9 (ref 0.8–1.7)
ALP SERPL-CCNC: 133 U/L (ref 45–117)
ALT SERPL-CCNC: 25 U/L (ref 13–56)
ANION GAP SERPL CALC-SCNC: 6 MMOL/L (ref 3–18)
AST SERPL-CCNC: 19 U/L (ref 10–38)
BILIRUB SERPL-MCNC: 0.5 MG/DL (ref 0.2–1)
BUN SERPL-MCNC: 22 MG/DL (ref 7–18)
BUN/CREAT SERPL: 17 (ref 12–20)
CALCIUM SERPL-MCNC: 10.1 MG/DL (ref 8.5–10.1)
CHLORIDE SERPL-SCNC: 105 MMOL/L (ref 100–111)
CHOLEST SERPL-MCNC: 265 MG/DL
CO2 SERPL-SCNC: 29 MMOL/L (ref 21–32)
CREAT SERPL-MCNC: 1.31 MG/DL (ref 0.6–1.3)
EST. AVERAGE GLUCOSE BLD GHB EST-MCNC: 229 MG/DL
GLOBULIN SER CALC-MCNC: 4.1 G/DL (ref 2–4)
GLUCOSE SERPL-MCNC: 154 MG/DL (ref 74–99)
HBA1C MFR BLD: 9.6 % (ref 4.2–5.6)
HDLC SERPL-MCNC: 86 MG/DL (ref 40–60)
HDLC SERPL: 3.1 (ref 0–5)
LDLC SERPL CALC-MCNC: 156.6 MG/DL (ref 0–100)
LIPID PANEL: ABNORMAL
POTASSIUM SERPL-SCNC: 4.6 MMOL/L (ref 3.5–5.5)
PROT SERPL-MCNC: 7.9 G/DL (ref 6.4–8.2)
SODIUM SERPL-SCNC: 140 MMOL/L (ref 136–145)
TRIGL SERPL-MCNC: 112 MG/DL
VLDLC SERPL CALC-MCNC: 22.4 MG/DL

## 2023-10-31 PROCEDURE — 80061 LIPID PANEL: CPT

## 2023-10-31 PROCEDURE — 36415 COLL VENOUS BLD VENIPUNCTURE: CPT

## 2023-10-31 PROCEDURE — 83036 HEMOGLOBIN GLYCOSYLATED A1C: CPT

## 2023-10-31 PROCEDURE — 80053 COMPREHEN METABOLIC PANEL: CPT

## 2023-11-07 ENCOUNTER — OFFICE VISIT (OUTPATIENT)
Age: 68
End: 2023-11-07
Payer: MEDICARE

## 2023-11-07 VITALS
HEART RATE: 86 BPM | DIASTOLIC BLOOD PRESSURE: 60 MMHG | RESPIRATION RATE: 16 BRPM | OXYGEN SATURATION: 99 % | SYSTOLIC BLOOD PRESSURE: 139 MMHG | HEIGHT: 65 IN | BODY MASS INDEX: 38.49 KG/M2 | WEIGHT: 231 LBS | TEMPERATURE: 98.4 F

## 2023-11-07 DIAGNOSIS — I10 ESSENTIAL (PRIMARY) HYPERTENSION: ICD-10-CM

## 2023-11-07 DIAGNOSIS — E66.01 SEVERE OBESITY (BMI 35.0-39.9) WITH COMORBIDITY (HCC): ICD-10-CM

## 2023-11-07 DIAGNOSIS — N18.31 TYPE 2 DIABETES MELLITUS WITH STAGE 3A CHRONIC KIDNEY DISEASE, WITH LONG-TERM CURRENT USE OF INSULIN (HCC): Primary | ICD-10-CM

## 2023-11-07 DIAGNOSIS — E78.00 PURE HYPERCHOLESTEROLEMIA, UNSPECIFIED: ICD-10-CM

## 2023-11-07 DIAGNOSIS — Z79.4 TYPE 2 DIABETES MELLITUS WITH STAGE 3A CHRONIC KIDNEY DISEASE, WITH LONG-TERM CURRENT USE OF INSULIN (HCC): Primary | ICD-10-CM

## 2023-11-07 DIAGNOSIS — E11.22 TYPE 2 DIABETES MELLITUS WITH STAGE 3A CHRONIC KIDNEY DISEASE, WITH LONG-TERM CURRENT USE OF INSULIN (HCC): Primary | ICD-10-CM

## 2023-11-07 PROCEDURE — 99214 OFFICE O/P EST MOD 30 MIN: CPT | Performed by: INTERNAL MEDICINE

## 2023-11-07 PROCEDURE — 3046F HEMOGLOBIN A1C LEVEL >9.0%: CPT | Performed by: INTERNAL MEDICINE

## 2023-11-07 PROCEDURE — 3078F DIAST BP <80 MM HG: CPT | Performed by: INTERNAL MEDICINE

## 2023-11-07 PROCEDURE — 3075F SYST BP GE 130 - 139MM HG: CPT | Performed by: INTERNAL MEDICINE

## 2023-11-07 PROCEDURE — 1123F ACP DISCUSS/DSCN MKR DOCD: CPT | Performed by: INTERNAL MEDICINE

## 2023-11-07 RX ORDER — FLUTICASONE PROPIONATE 44 UG/1
AEROSOL, METERED RESPIRATORY (INHALATION)
Qty: 3 EACH | Refills: 3 | Status: SHIPPED | OUTPATIENT
Start: 2023-11-07

## 2023-11-07 NOTE — PROGRESS NOTES
Anthony Neither presents today for   Chief Complaint   Patient presents with    Cholesterol Problem    Diabetes    Hypertension     Follow up            1. \"Have you been to the ER, urgent care clinic since your last visit? Hospitalized since your last visit? \" no    2. \"Have you seen or consulted any other health care providers outside of the [address] [place] [address] since your last visit? \" no     3. For patients aged 43-73: Has the patient had a colonoscopy / FIT/ Cologuard? Yes - no Care Gap present      If the patient is female:    4. For patients aged 43-66: Has the patient had a mammogram within the past 2 years? Yes - no Care Gap present      5. For patients aged 21-65: Has the patient had a pap smear?  NA - based on age or sex
E11.22 [facility] - Pharmacist [facility] ([place])    N18.31 Microalbumin / Creatinine Urine Ratio    Z79.4 Hemoglobin A1C      2. Pure hypercholesterolemia, unspecified  E78.00 Lipid Panel      3. Essential (primary) hypertension  I10 Comprehensive Metabolic Panel      4. Severe obesity (BMI 35.0-39. 9) with comorbidity ([address])  E66.01 Have advised patient to consider medical weight loss program especially since she has been unable to lose the weight on her own. Again emphasized not eating more than 3 times a day with high-protein low-carb and lots of fruits and vegetables in her meals. Return in about 3 months (around 2/7/2024) for labs 1 week before, Refer to PharmD. Diabetic issues reviewed with her: diabetic diet discussed in detail, and low cholesterol diet, weight control and daily exercise discussed. Reviewed plan of care. Patient has provided input and agrees with goals.

## 2023-11-09 LAB
ALBUMIN/CREAT UR: 38 MG/G CREAT (ref 0–29)
CREAT UR-MCNC: 56.3 MG/DL
MICROALBUMIN UR-MCNC: 21.2 UG/ML

## 2023-11-14 ENCOUNTER — TELEPHONE (OUTPATIENT)
Age: 68
End: 2023-11-14

## 2023-11-14 NOTE — TELEPHONE ENCOUNTER
Pt called states that she received a call from [facility] telling her   That her prescriptions were in the warehouse and should be delivered between 11/09-11/12 she still has not received them I advised her to call [facility] service to check on it

## 2023-12-12 ENCOUNTER — HOSPITAL ENCOUNTER (OUTPATIENT)
Facility: HOSPITAL | Age: 68
Discharge: HOME | End: 2023-12-15
Attending: INTERNAL MEDICINE
Payer: MEDICARE

## 2023-12-12 VITALS — WEIGHT: 231.04 LBS | HEIGHT: 65 IN | BODY MASS INDEX: 38.49 KG/M2

## 2023-12-12 DIAGNOSIS — Z12.31 VISIT FOR SCREENING MAMMOGRAM: ICD-10-CM

## 2023-12-12 PROCEDURE — 77063 BREAST TOMOSYNTHESIS BI: CPT

## 2023-12-15 ENCOUNTER — PHARMACY VISIT (OUTPATIENT)
Age: 68
End: 2023-12-15

## 2023-12-15 DIAGNOSIS — E11.22 TYPE 2 DIABETES MELLITUS WITH STAGE 3A CHRONIC KIDNEY DISEASE, WITH LONG-TERM CURRENT USE OF INSULIN (HCC): Primary | ICD-10-CM

## 2023-12-15 DIAGNOSIS — Z79.4 TYPE 2 DIABETES MELLITUS WITH STAGE 3A CHRONIC KIDNEY DISEASE, WITH LONG-TERM CURRENT USE OF INSULIN (HCC): Primary | ICD-10-CM

## 2023-12-15 DIAGNOSIS — N18.31 TYPE 2 DIABETES MELLITUS WITH STAGE 3A CHRONIC KIDNEY DISEASE, WITH LONG-TERM CURRENT USE OF INSULIN (HCC): Primary | ICD-10-CM

## 2023-12-15 RX ORDER — SEMAGLUTIDE 0.68 MG/ML
0.5 INJECTION, SOLUTION SUBCUTANEOUS
Qty: 12 ML | Refills: 3
Start: 2023-12-15

## 2023-12-15 RX ORDER — INSULIN DEGLUDEC 200 U/ML
70 INJECTION, SOLUTION SUBCUTANEOUS NIGHTLY
Qty: 10 ADJUSTABLE DOSE PRE-FILLED PEN SYRINGE | Refills: 3
Start: 2023-12-15

## 2023-12-15 NOTE — PROGRESS NOTES
Pharmacy Progress Note - Diabetes Management       Assessment / Plan:   Diabetes Management:  Per ADA guidelines, Pt's A1c {IS/IS NOT:19932} at goal of < 7%.  ***     Hypertension:  Blood pressure {IS/IS NOT:19932} at goal of < 130/80 mmHg per the AHA guidelines. - Emphasized the importance of regular physical activity, restricting salt in diet and weight maintenance/loss on overall blood pressure control. Hyperlipidemia:  The 10-year ASCVD risk score (Bernabe DK, et al., 2019) is: 34.2% based on parameters listed. Current lipid treatment guidelines recommend at least moderate-intensity statin doses for all patients with diabetes to decrease overall ASCVD risk. Patient currently qualifies for a *** intensity statin therapy based on current recommendations and is currently taking a *** intensity statin. Recommendations to Ady Esteves MD:    - ***     Nutrition/Lifestyle Modifications:  - Educated pt on the importance of moderating carbohydrate intake. Reviewed sources of carbohydrates and method to help determine appropriate portion sizes (e.g., Diabetes Plate Method). - Advised patient to avoid sugar-sweetened beverages and replace with water or diet/zero sugar option.  - Recommend ~30 minutes consistent, moderately intensive, exercise/day or ~150 minutes/week. Start small, stay consistent, and increase length and types of exercise, as tolerated. Patient will return to clinic in *** week(s) for follow up. S/O: Ms. Disha Dubon, a 76 y.o. female referred by Ady Esteves MD,  has a past medical history of CTS (carpal tunnel syndrome), DJD (degenerative joint disease) of knee, Glaucoma, Hand paresthesia, Hypercholesterolemia, Hypertension, and Pleurisy. Pt was seen today for diabetes management.   Patient's last A1c was:   Hemoglobin A1C   Date Value Ref Range Status   10/31/2023 9.6 (H) 4.2 - 5.6 % Final     Comment:     (NOTE)  HbA1C Interpretive Ranges  <5.7              Normal  5.7
MCV 76 08/02/2022 11:44 AM     No results found for: \"HBA1C\"  Hemoglobin A1C   Date Value Ref Range Status   10/31/2023 9.6 (H) 4.2 - 5.6 % Final     Comment:     (NOTE)  HbA1C Interpretive Ranges  <5.7              Normal  5.7 - 6.4         Consider Prediabetes  >6.5              Consider Diabetes       Hemoglobin A1C, POC   Date Value Ref Range Status   06/03/2021 8.2 % Final       Screenings/Prevention Parameters:  -Diabetic Eye and Foot Exams:      Diabetes Management   Topic Date Due    Diabetic foot exam  12/28/2018         Diabetic Foot Exam HM Status   Topic Date Due    Diabetic Foot Exam  12/28/2018      -Microalbumin / Creatinine ratio:       Lab Results   Component Value Date/Time    MICROALBUR 8.3 11/03/2022 11:30 AM    LABCREA 56.3 11/07/2023 09:10 AM     -ASCVD Risk Score Parameters and Calculation    Race: Black /      BP Readings from Last 3 Encounters:   11/07/23 139/60   07/07/23 (!) 137/55   03/29/23 (!) 130/55        Lab Results   Component Value Date    CHOL 265 (H) 10/31/2023    CHOL 245 (H) 06/30/2023    CHOL 259 (H) 03/17/2023     Lab Results   Component Value Date    TRIG 112 10/31/2023    TRIG 67 06/30/2023    TRIG 84 03/17/2023     Lab Results   Component Value Date    HDL 86 (H) 10/31/2023    HDL 76 06/30/2023    HDL 80 03/17/2023     Lab Results   Component Value Date    LDLCALC 156.6 (H) 10/31/2023    LDLCALC 158 (H) 06/30/2023    LDLCALC 165 (H) 03/17/2023        Social History     Tobacco Use    Smoking status: Never     Passive exposure: Never    Smokeless tobacco: Never   Substance Use Topics    Alcohol use: No         Calculated ASCVD Risk Score:  The 10-year ASCVD risk score (Bernabe DE SANTIAGO, et al., 2019) is: 34.2%    Values used to calculate the score:      Age: 76 years      Sex: Female      Is Non- : Yes      Diabetic: Yes      Tobacco smoker: No      Systolic Blood Pressure: 071 mmHg      Is BP treated: Yes      HDL Cholesterol: 86 MG/DL

## 2023-12-15 NOTE — PATIENT INSTRUCTIONS
Your Visit Summary:     Plan:  - Once the Twila Aranda Adas comes in, Stop the Novolin 70/30    - Start Tresiba 70 units every night at bedtime    - Start Ozempic 0.25mg weekly for 4 weeks, and then increase to 0.5mg weekly      In the meantime, increase your blood glucose checks to before each meal and at bedtime. Call me with any questions or concerns  Camden Mayorga [phone]    Check and document your blood sugar first thing in the morning (fasting at least 8 hours), 2 hours after a meal, and/or before bedtime. Bring your meter/log to all future visits. Your blood sugar goals:  - Fasting (first thing in the morning)  blood sugar: 80 - 130mg/dL  - 2 hours after a meal: 80 - 180mg/dL    When you experience symptoms of low blood sugar (example: less than 70):  - Confirm low reading by checking your blood sugar.   - Then treat with 15 grams of carbohydrates (one-half cup of juice or regular soda, or 4-5 glucose tablets). - Wait 15 minutes to recheck blood sugar.   - Then eat a protein containing meal/snack to prevent another low blood sugar episode. (example: peanut butter + crackers)    Nutrition:  - When reviewing a nutrition label, focus on the serving size, total calories, fat (and type of fats), total carbohydrates, sugar (and amount of added sugar), amount of fiber (good for your digestive), and amount of protein. Refer to your nutrition label guide for more information.  - For a meal : max 45 - 60 grams of carbohydrates  - For a snack: max 15 grams of carbohydrates  - Reduce amount of saturated and trans fat. Consider more unsaturated fat options as they are better for your heart health.    - Have at least 1 serving of lean fat protein with each meal.    - Increase fiber intake slowly to prevent constipation.   - Substitute fruit juices for the whole fruit    Low carb snack ideas (15 grams total carb or less):    String cheese or babybel with 6 crackers  4 peanut butter crackers  3 cups of

## 2024-01-02 RX ORDER — LISINOPRIL AND HYDROCHLOROTHIAZIDE 12.5; 1 MG/1; MG/1
1 TABLET ORAL 2 TIMES DAILY
Qty: 180 TABLET | Refills: 2 | Status: SHIPPED | OUTPATIENT
Start: 2024-01-02

## 2024-01-16 ENCOUNTER — OFFICE VISIT (OUTPATIENT)
Age: 69
End: 2024-01-16
Payer: MEDICARE

## 2024-01-16 VITALS
HEIGHT: 65 IN | SYSTOLIC BLOOD PRESSURE: 123 MMHG | DIASTOLIC BLOOD PRESSURE: 78 MMHG | RESPIRATION RATE: 20 BRPM | BODY MASS INDEX: 37.15 KG/M2 | OXYGEN SATURATION: 99 % | TEMPERATURE: 97.9 F | WEIGHT: 223 LBS | HEART RATE: 91 BPM

## 2024-01-16 DIAGNOSIS — E11.22 TYPE 2 DIABETES MELLITUS WITH STAGE 3A CHRONIC KIDNEY DISEASE, WITH LONG-TERM CURRENT USE OF INSULIN (HCC): ICD-10-CM

## 2024-01-16 DIAGNOSIS — N18.31 TYPE 2 DIABETES MELLITUS WITH STAGE 3A CHRONIC KIDNEY DISEASE, WITH LONG-TERM CURRENT USE OF INSULIN (HCC): ICD-10-CM

## 2024-01-16 DIAGNOSIS — Z00.00 MEDICARE ANNUAL WELLNESS VISIT, SUBSEQUENT: Primary | ICD-10-CM

## 2024-01-16 DIAGNOSIS — E78.00 PURE HYPERCHOLESTEROLEMIA, UNSPECIFIED: ICD-10-CM

## 2024-01-16 DIAGNOSIS — E66.01 SEVERE OBESITY (BMI 35.0-39.9) WITH COMORBIDITY (HCC): ICD-10-CM

## 2024-01-16 DIAGNOSIS — Z79.4 TYPE 2 DIABETES MELLITUS WITH STAGE 3A CHRONIC KIDNEY DISEASE, WITH LONG-TERM CURRENT USE OF INSULIN (HCC): ICD-10-CM

## 2024-01-16 DIAGNOSIS — I10 ESSENTIAL (PRIMARY) HYPERTENSION: ICD-10-CM

## 2024-01-16 PROCEDURE — 1090F PRES/ABSN URINE INCON ASSESS: CPT | Performed by: INTERNAL MEDICINE

## 2024-01-16 PROCEDURE — 3017F COLORECTAL CA SCREEN DOC REV: CPT | Performed by: INTERNAL MEDICINE

## 2024-01-16 PROCEDURE — G0439 PPPS, SUBSEQ VISIT: HCPCS | Performed by: INTERNAL MEDICINE

## 2024-01-16 PROCEDURE — 3074F SYST BP LT 130 MM HG: CPT | Performed by: INTERNAL MEDICINE

## 2024-01-16 PROCEDURE — 1123F ACP DISCUSS/DSCN MKR DOCD: CPT | Performed by: INTERNAL MEDICINE

## 2024-01-16 PROCEDURE — G8427 DOCREV CUR MEDS BY ELIG CLIN: HCPCS | Performed by: INTERNAL MEDICINE

## 2024-01-16 PROCEDURE — G8417 CALC BMI ABV UP PARAM F/U: HCPCS | Performed by: INTERNAL MEDICINE

## 2024-01-16 PROCEDURE — 1036F TOBACCO NON-USER: CPT | Performed by: INTERNAL MEDICINE

## 2024-01-16 PROCEDURE — 3078F DIAST BP <80 MM HG: CPT | Performed by: INTERNAL MEDICINE

## 2024-01-16 PROCEDURE — 99213 OFFICE O/P EST LOW 20 MIN: CPT | Performed by: INTERNAL MEDICINE

## 2024-01-16 PROCEDURE — 3046F HEMOGLOBIN A1C LEVEL >9.0%: CPT | Performed by: INTERNAL MEDICINE

## 2024-01-16 PROCEDURE — 2022F DILAT RTA XM EVC RTNOPTHY: CPT | Performed by: INTERNAL MEDICINE

## 2024-01-16 PROCEDURE — G8400 PT W/DXA NO RESULTS DOC: HCPCS | Performed by: INTERNAL MEDICINE

## 2024-01-16 PROCEDURE — 99211 OFF/OP EST MAY X REQ PHY/QHP: CPT | Performed by: INTERNAL MEDICINE

## 2024-01-16 PROCEDURE — G8483 FLU IMM NO ADMIN DOC REA: HCPCS | Performed by: INTERNAL MEDICINE

## 2024-01-16 ASSESSMENT — PATIENT HEALTH QUESTIONNAIRE - PHQ9
1. LITTLE INTEREST OR PLEASURE IN DOING THINGS: 0
SUM OF ALL RESPONSES TO PHQ9 QUESTIONS 1 & 2: 0
SUM OF ALL RESPONSES TO PHQ QUESTIONS 1-9: 0
SUM OF ALL RESPONSES TO PHQ QUESTIONS 1-9: 0
2. FEELING DOWN, DEPRESSED OR HOPELESS: 0
SUM OF ALL RESPONSES TO PHQ QUESTIONS 1-9: 0
SUM OF ALL RESPONSES TO PHQ QUESTIONS 1-9: 0

## 2024-01-16 NOTE — PATIENT INSTRUCTIONS
Advance Directives: Care Instructions  Overview  An advance directive is a legal way to state your wishes at the end of your life. It tells your family and your doctor what to do if you can't say what you want.  There are two main types of advance directives. You can change them any time your wishes change.  Living will.  This form tells your family and your doctor your wishes about life support and other treatment. The form is also called a declaration.  Medical power of .  This form lets you name a person to make treatment decisions for you when you can't speak for yourself. This person is called a health care agent (health care proxy, health care surrogate). The form is also called a durable power of  for health care.  If you do not have an advance directive, decisions about your medical care may be made by a family member, or by a doctor or a  who doesn't know you.  It may help to think of an advance directive as a gift to the people who care for you. If you have one, they won't have to make tough decisions by themselves.  For more information, including forms for your state, see the [employer] website (www.caringinfo.org/planning/advance-directives/).  Follow-up care is a key part of your treatment and safety. Be sure to make and go to all appointments, and call your doctor if you are having problems. It's also a good idea to know your test results and keep a list of the medicines you take.  What should you include in an advance directive?  Many states have a unique advance directive form. (It may ask you to address specific issues.) Or you might use a universal form that's approved by many states.  If your form doesn't tell you what to address, it may be hard to know what to include in your advance directive. Use the questions below to help you get started.  Who do you want to make decisions about your medical care if you are not able to?  What life-support measures do you want if you  nylon

## 2024-01-16 NOTE — PROGRESS NOTES
Christina Nuñez (:  1955) is a 68 y.o. female established patient, here for evaluation of the following chief complaint(s):  Hypertension, Cholesterol Problem (Follow up ), and Medicare AWV         ASSESSMENT/PLAN:    ICD-10-CM    1. Medicare annual wellness visit, subsequent  Z00.00       2. Type 2 diabetes mellitus with stage 3a chronic kidney disease, with long-term current use of insulin (HCC)  E11.22 Control uncertain patient currently on insulin 70-30 (HUMULIN;NOVOLIN) (70-30) 100 UNIT per ML injection vial 50 units twice daily.  She will follow-up with Pharm.D. to see if she is able to transition to Ozempic and Tresiba insulin once the paperwork has been straightened out    N18.31     Z79.4       3. Essential (primary) hypertension  I10 Controlled on lisinopril HCTZ      4. Pure hypercholesterolemia, unspecified  E78.00 Patient unable to tolerate statins due to myalgias      5. Severe obesity (BMI 35.0-39.9) with comorbidity (HCC)  E66.01 .  Patient will continue to work on diet and exercise is much as she can to lose weight.             Return for Refer to PharmD.         Subjective   SUBJECTIVE/OBJECTIVE:  Patient was recently diagnosed with COVID 19 and recovered on her own without taking any prescription medications.  She will get her flu shot and she is aware she should not get a COVID booster for at least 3 months.  She tells me her blood sugars are running between 55 low 100s on Humulin 70/30 50 units twice daily before meals.  Patient was seen by Pharm.D. and there was an attempt to transition her to Ozempic and Tresiba insulin but she brings in a letter today from the pharmaceutical program that said they did not get the prescriptions for the medications.  Patient will follow-up with Pharm.D. for further management.  Patient who is new to [employer] insurance says that they no longer will cover Flovent.  Will need to find out which steroid inhaler they will cover.  Overall patient is doing

## 2024-01-16 NOTE — PROGRESS NOTES
Medicare Annual Wellness Visit    Christina Nuñez is here for Hypertension, Cholesterol Problem (Follow up ), and Medicare AWV    Assessment & Plan   Medicare annual wellness visit, subsequent  Type 2 diabetes mellitus with stage 3a chronic kidney disease, with long-term current use of insulin (HCC)  Essential (primary) hypertension  Pure hypercholesterolemia, unspecified  Severe obesity (BMI 35.0-39.9) with comorbidity (HCC)  Recommendations for Preventive Services Due: see orders and patient instructions/AVS.  Recommended screening schedule for the next 5-10 years is provided to the patient in written form: see Patient Instructions/AVS.     Return for Refer to PharmD.     Subjective       Patient's complete Health Risk Assessment and screening values have been reviewed and are found in Flowsheets. The following problems were reviewed today and where indicated follow up appointments were made and/or referrals ordered.    Positive Risk Factor Screenings with Interventions:                Activity, Diet, and Weight:  On average, how many days per week do you engage in moderate to strenuous exercise (like a brisk walk)?: 5 days  On average, how many minutes do you engage in exercise at this level?: 30 min    Do you eat balanced/healthy meals regularly?: Yes    Body mass index is 37.11 kg/m². (!) Abnormal    Obesity Interventions:  low carbohydrate diet                 Advanced Directives:  Do you have a Living Will?: (!) No    Intervention:  has NO advanced directive - not interested in additional information                     Objective   Vitals:    01/16/24 1320 01/16/24 1327 01/16/24 1343   BP: (!) 140/80 (!) 149/83 123/78   Site: Right Upper Arm Right Upper Arm    Position: Sitting Sitting    Cuff Size: Large Adult Large Adult    Pulse: 100 88 91   Resp: 20     Temp: 97.9 °F (36.6 °C)     TempSrc: Temporal     SpO2: 99%     Weight: 101.2 kg (223 lb)     Height: 1.651 m (5' 5\")        Body mass index is 37.11

## 2024-01-16 NOTE — PROGRESS NOTES
Christina Nuñez presents today for   Chief Complaint   Patient presents with    Hypertension    Cholesterol Problem     Follow up     Medicare AW       Patient states she will need an alternative for Flovent because she will have to pay $500.00.      1. \"Have you been to the ER, urgent care clinic since your last visit?  Hospitalized since your last visit?\" no    2. \"Have you seen or consulted any other health care providers outside of the [facility] since your last visit?\" no     3. For patients aged 45-75: Has the patient had a colonoscopy / FIT/ Cologuard? Yes - no Care Gap present      If the patient is female:    4. For patients aged 40-74: Has the patient had a mammogram within the past 2 years? Yes - no Care Gap present      5. For patients aged 21-65: Has the patient had a pap smear? NA - based on age or sex

## 2024-01-30 DIAGNOSIS — Z79.4 TYPE 2 DIABETES MELLITUS WITH STAGE 3A CHRONIC KIDNEY DISEASE, WITH LONG-TERM CURRENT USE OF INSULIN (HCC): ICD-10-CM

## 2024-01-30 DIAGNOSIS — E78.00 PURE HYPERCHOLESTEROLEMIA, UNSPECIFIED: ICD-10-CM

## 2024-01-30 DIAGNOSIS — I10 ESSENTIAL (PRIMARY) HYPERTENSION: ICD-10-CM

## 2024-01-30 DIAGNOSIS — E11.22 TYPE 2 DIABETES MELLITUS WITH STAGE 3A CHRONIC KIDNEY DISEASE, WITH LONG-TERM CURRENT USE OF INSULIN (HCC): ICD-10-CM

## 2024-01-30 DIAGNOSIS — N18.31 TYPE 2 DIABETES MELLITUS WITH STAGE 3A CHRONIC KIDNEY DISEASE, WITH LONG-TERM CURRENT USE OF INSULIN (HCC): ICD-10-CM

## 2024-01-31 LAB
ALBUMIN SERPL-MCNC: 4.1 G/DL (ref 3.9–4.9)
ALBUMIN/GLOB SERPL: 1.4 {RATIO} (ref 1.2–2.2)
ALP SERPL-CCNC: 129 IU/L (ref 44–121)
ALT SERPL-CCNC: 16 IU/L (ref 0–32)
AST SERPL-CCNC: 21 IU/L (ref 0–40)
BILIRUB SERPL-MCNC: 0.2 MG/DL (ref 0–1.2)
BUN SERPL-MCNC: 24 MG/DL (ref 8–27)
BUN/CREAT SERPL: 20 (ref 12–28)
CALCIUM SERPL-MCNC: 9.9 MG/DL (ref 8.7–10.3)
CHLORIDE SERPL-SCNC: 105 MMOL/L (ref 96–106)
CHOLEST SERPL-MCNC: 249 MG/DL (ref 100–199)
CO2 SERPL-SCNC: 23 MMOL/L (ref 20–29)
CREAT SERPL-MCNC: 1.19 MG/DL (ref 0.57–1)
EGFRCR SERPLBLD CKD-EPI 2021: 50 ML/MIN/1.73
GLOBULIN SER CALC-MCNC: 2.9 G/DL (ref 1.5–4.5)
GLUCOSE SERPL-MCNC: 134 MG/DL (ref 70–99)
HBA1C MFR BLD: 10.4 % (ref 4.8–5.6)
HDLC SERPL-MCNC: 76 MG/DL
LDLC SERPL CALC-MCNC: 150 MG/DL (ref 0–99)
POTASSIUM SERPL-SCNC: 5.1 MMOL/L (ref 3.5–5.2)
PROT SERPL-MCNC: 7 G/DL (ref 6–8.5)
SODIUM SERPL-SCNC: 146 MMOL/L (ref 134–144)
TRIGL SERPL-MCNC: 131 MG/DL (ref 0–149)
VLDLC SERPL CALC-MCNC: 23 MG/DL (ref 5–40)

## 2024-02-01 LAB
ALBUMIN/CREAT UR: 305 MG/G CREAT (ref 0–29)
CREAT UR-MCNC: 104.8 MG/DL
MICROALBUMIN UR-MCNC: 319.6 UG/ML

## 2024-02-06 ENCOUNTER — OFFICE VISIT (OUTPATIENT)
Age: 69
End: 2024-02-06
Payer: MEDICARE

## 2024-02-06 VITALS
HEIGHT: 65 IN | WEIGHT: 231 LBS | RESPIRATION RATE: 20 BRPM | BODY MASS INDEX: 38.49 KG/M2 | HEART RATE: 96 BPM | TEMPERATURE: 98.8 F | SYSTOLIC BLOOD PRESSURE: 126 MMHG | OXYGEN SATURATION: 100 % | DIASTOLIC BLOOD PRESSURE: 68 MMHG

## 2024-02-06 DIAGNOSIS — E78.00 PURE HYPERCHOLESTEROLEMIA, UNSPECIFIED: ICD-10-CM

## 2024-02-06 DIAGNOSIS — E55.9 VITAMIN D DEFICIENCY: ICD-10-CM

## 2024-02-06 DIAGNOSIS — E66.01 SEVERE OBESITY (BMI 35.0-39.9) WITH COMORBIDITY (HCC): ICD-10-CM

## 2024-02-06 DIAGNOSIS — E11.22 TYPE 2 DIABETES MELLITUS WITH STAGE 3A CHRONIC KIDNEY DISEASE, WITH LONG-TERM CURRENT USE OF INSULIN (HCC): Primary | ICD-10-CM

## 2024-02-06 DIAGNOSIS — N18.31 TYPE 2 DIABETES MELLITUS WITH STAGE 3A CHRONIC KIDNEY DISEASE, WITH LONG-TERM CURRENT USE OF INSULIN (HCC): Primary | ICD-10-CM

## 2024-02-06 DIAGNOSIS — J45.30 MILD PERSISTENT ASTHMA WITHOUT COMPLICATION: ICD-10-CM

## 2024-02-06 DIAGNOSIS — I10 ESSENTIAL (PRIMARY) HYPERTENSION: ICD-10-CM

## 2024-02-06 DIAGNOSIS — Z79.4 TYPE 2 DIABETES MELLITUS WITH STAGE 3A CHRONIC KIDNEY DISEASE, WITH LONG-TERM CURRENT USE OF INSULIN (HCC): Primary | ICD-10-CM

## 2024-02-06 PROCEDURE — 3017F COLORECTAL CA SCREEN DOC REV: CPT | Performed by: INTERNAL MEDICINE

## 2024-02-06 PROCEDURE — G8400 PT W/DXA NO RESULTS DOC: HCPCS | Performed by: INTERNAL MEDICINE

## 2024-02-06 PROCEDURE — 3078F DIAST BP <80 MM HG: CPT | Performed by: INTERNAL MEDICINE

## 2024-02-06 PROCEDURE — G8427 DOCREV CUR MEDS BY ELIG CLIN: HCPCS | Performed by: INTERNAL MEDICINE

## 2024-02-06 PROCEDURE — G8417 CALC BMI ABV UP PARAM F/U: HCPCS | Performed by: INTERNAL MEDICINE

## 2024-02-06 PROCEDURE — 1090F PRES/ABSN URINE INCON ASSESS: CPT | Performed by: INTERNAL MEDICINE

## 2024-02-06 PROCEDURE — 1036F TOBACCO NON-USER: CPT | Performed by: INTERNAL MEDICINE

## 2024-02-06 PROCEDURE — 3074F SYST BP LT 130 MM HG: CPT | Performed by: INTERNAL MEDICINE

## 2024-02-06 PROCEDURE — 3046F HEMOGLOBIN A1C LEVEL >9.0%: CPT | Performed by: INTERNAL MEDICINE

## 2024-02-06 PROCEDURE — 99214 OFFICE O/P EST MOD 30 MIN: CPT | Performed by: INTERNAL MEDICINE

## 2024-02-06 PROCEDURE — 1123F ACP DISCUSS/DSCN MKR DOCD: CPT | Performed by: INTERNAL MEDICINE

## 2024-02-06 PROCEDURE — G8483 FLU IMM NO ADMIN DOC REA: HCPCS | Performed by: INTERNAL MEDICINE

## 2024-02-06 PROCEDURE — 2022F DILAT RTA XM EVC RTNOPTHY: CPT | Performed by: INTERNAL MEDICINE

## 2024-02-06 NOTE — PROGRESS NOTES
Christina Nuñez presents today for   Chief Complaint   Patient presents with    Cholesterol Problem    Hypertension    Diabetes     3 month follow up        Patient states she is not taking Flovent HFA because it is too expensive $500.00.      \"Have you been to the ER, urgent care clinic since your last visit?  Hospitalized since your last visit?\"    NO    “Have you seen or consulted any other health care providers outside of [facility] since your last visit?”    NO

## 2024-02-06 NOTE — PROGRESS NOTES
Subjective:       Chief Complaint  The patient presents for follow up of diabetes, hypertension and high cholesterol.        HPI  Christina Nuñez is a 68 y.o. female seen for follow up of diabetes.   Shealso has hypertension and hyperlipidemia. Diabetes uncontrolled  on novolog 70/30  Insulin 50 units QAM and 50 units QPM, patient Ozempic through the pharmaceutical program but is waiting to see Pharm.D. to start the medication.  She would also be a good candidate for SGLT2 because of her microalbuminuria and also to help with her diet needs.  Cost may be an issue and will defer to Pharm.D. she is a candidate for the pharmaceutical program.    Patient now has developed chronic kidney disease stage IIIb , patient will benefit from SGLT2 but cost has been an issue in the past with medications, patient was referred to Pharm.D for further management..   Hypertension well controlled, no significant medication side effects noted, on Zestoretic 10/12.5 BID, hyperlipidemia poorly controlled, off crestor which she stopped due to myalgias. She may benefit from Repatha in the future. Pt has sleep apnea but does not use CPAP machine.      Diet and Lifestyle: not attempting to follow a low fat, low cholesterol diet, does not rigorously follow a diabetic diet, exercises sporadically    Home BP Monitoring: is not measured at home.    Diabetic Review of Systems - home glucose monitoring: is performed regularly, 3x/day.    Other symptoms and concerns: pt needs to work on losing weight to improve her overall health her BMI of 38 puts her at increased risk of multiple medical problems    Vit D deficiency is well controlled on current supplementation.      Pt continues to have knee pain due to arthritis.     Asthma is uncontrolled.  Patient is using albuterol inhaler 2 times daily.  Insurance did not cover Flovent so we will see if they will cover QVAR.     Pt has had h/o uterine cancer and was followed by Dr Madrigal. She will set up

## 2024-02-07 NOTE — PROGRESS NOTES
beclomethasone (QVAR REDIHALER) 80 MCG/ACT AERB inhaler Inhale 1 puff into the lungs in the morning and 1 puff in the evening.    lisinopril-hydroCHLOROthiazide (PRINZIDE;ZESTORETIC) 10-12.5 MG per tablet Take 1 tablet by mouth 2 times daily    Semaglutide,0.25 or 0.5MG/DOS, (OZEMPIC, 0.25 OR 0.5 MG/DOSE,) 2 MG/3ML SOPN Inject 0.5 mg into the skin every 7 days PAP med (Patient not taking: Reported on 2/6/2024)    albuterol sulfate HFA (PROVENTIL;VENTOLIN;PROAIR) 108 (90 Base) MCG/ACT inhaler Inhale 2 puffs into the lungs every 4 hours as needed    azelastine (ASTELIN) 0.1 % nasal spray 2 sprays by Nasal route 2 times daily    brimonidine-timolol (COMBIGAN) 0.2-0.5 % ophthalmic solution ceived the following from [facility]: Outside name: COMBIGAN 0.2-0.5 % drop ophthalmic solution    Calcium Carb-Cholecalciferol 600-20 MG-MCG CHEW Take 1 tablet by mouth daily    mupirocin (BACTROBAN) 2 % ointment Apply topically daily     No current facility-administered medications for this visit.     Allergies:  Allergies   Allergen Reactions    Metformin Diarrhea    Rosuvastatin Myalgia       Blood Glucose Monitoring (BGM) or CGM:  - Had access to home glucometer/blood glucose log/CGM reader today:  NO - poor historian    ROS:  Today, Pt endorses:  - Symptoms of Hyperglycemia: none  - Symptoms of Hypoglycemia: none    Lifestyle modification(s):  - none    Medication Adherence/Access:  - Endorses adherence to current regimen?: Yes    Vitals/Labs:  No results found for: \"HBA1C\"  Hemoglobin A1C   Date Value Ref Range Status   01/30/2024 10.4 (H) 4.8 - 5.6 % Final     Comment:                 Prediabetes: 5.7 - 6.4           Diabetes: >6.4           Glycemic control for adults with diabetes: <7.0     10/31/2023 9.6 (H) 4.2 - 5.6 % Final     Comment:     (NOTE)  HbA1C Interpretive Ranges  <5.7              Normal  5.7 - 6.4         Consider Prediabetes  >6.5              Consider Diabetes     06/30/2023 9.8 (H) 4.8 -

## 2024-02-09 ENCOUNTER — PHARMACY VISIT (OUTPATIENT)
Age: 69
End: 2024-02-09

## 2024-02-09 DIAGNOSIS — E11.22 TYPE 2 DIABETES MELLITUS WITH STAGE 3A CHRONIC KIDNEY DISEASE, WITH LONG-TERM CURRENT USE OF INSULIN (HCC): Primary | ICD-10-CM

## 2024-02-09 DIAGNOSIS — N18.31 TYPE 2 DIABETES MELLITUS WITH STAGE 3A CHRONIC KIDNEY DISEASE, WITH LONG-TERM CURRENT USE OF INSULIN (HCC): Primary | ICD-10-CM

## 2024-02-09 DIAGNOSIS — Z79.4 TYPE 2 DIABETES MELLITUS WITH STAGE 3A CHRONIC KIDNEY DISEASE, WITH LONG-TERM CURRENT USE OF INSULIN (HCC): Primary | ICD-10-CM

## 2024-02-09 RX ORDER — INSULIN DEGLUDEC 200 U/ML
70 INJECTION, SOLUTION SUBCUTANEOUS NIGHTLY
Qty: 6 ADJUSTABLE DOSE PRE-FILLED PEN SYRINGE | Refills: 11 | Status: SHIPPED | OUTPATIENT
Start: 2024-02-09

## 2024-02-09 NOTE — PATIENT INSTRUCTIONS
Plan:  - Once you  the Tresiba: Stop the Novolin 70/30   * Inject Novolin 70/30 only in the morning on the day you start the Tresiba at bedtime - DO NOT inject the Novolin 70/30 again after that time.     - Start Tresiba 70 units every night at bedtime     - Start Ozempic when you start the Tresiba: 0.25mg weekly for 4 weeks, and then increase to 0.5mg weekly    - Do not start the Farxiga 10mg until you come to your next appointment     In the meantime, increase your blood glucose checks to before each meal and at bedtime.

## 2024-02-21 NOTE — PROGRESS NOTES
Pharmacy Progress Note - Diabetes Management       Assessment / Plan:   Diabetes Management:  Per ADA guidelines, Pt's A1c is not at goal of < 7%.  Pt's glycemic control has improved with the recent switch to Tresiba and Ozempic.  She is no longer having hypoglycemia sx throughout the day and overnight.  Her FBG values are trending down with some recent AM hypoglycemia as she continues with her Ozempic injections.  Will need to decrease her Tresiba dose to compensate for the increased prandial control.  Will be starting Farxiga 10mg qam tomorrow and will need to decrease her Tresiba dose for this as well.  Will decrease her Tresiba to 50 units qhs.  Will have her continue her Ozempic titration to 0.5mg weekly by the time of the next appointment.  Ordered and will be starting a Dexcom G7 CGM at the next visit to better monitor her glycemic control.  - Decrease Tresiba to 50 units qhs  - Continue Ozempic 0.25mg x 2 more weeks and then increase to 0.5mg weekly  - Start Farxiga 10mg qam   - Will reassess with SMBG logs at follow up in 3 weeks.     Nutrition/Lifestyle Modifications:  - Educated pt on the importance of moderating carbohydrate intake. Reviewed sources of carbohydrates and method to help determine appropriate portion sizes (e.g., Diabetes Plate Method).  - Advised patient to avoid sugar-sweetened beverages and replace with water or diet/zero sugar option.  - Recommend ~30 minutes consistent, moderately intensive, exercise/day or ~150 minutes/week. Start small, stay consistent, and increase length and types of exercise, as tolerated.       Patient will return to clinic in 3 week(s) for follow up.        S/O: Ms. Christina Nuñez, a 68 y.o. female referred by Rosendo Zaragoza MD,  has a past medical history of CTS (carpal tunnel syndrome), DJD (degenerative joint disease) of knee, Glaucoma, Hand paresthesia, Hypercholesterolemia, Hypertension, and Pleurisy.  Pt was seen today for diabetes management.

## 2024-02-22 ENCOUNTER — PHARMACY VISIT (OUTPATIENT)
Age: 69
End: 2024-02-22

## 2024-02-22 DIAGNOSIS — E11.21 TYPE 2 DIABETES WITH NEPHROPATHY (HCC): Primary | ICD-10-CM

## 2024-02-22 RX ORDER — INSULIN DEGLUDEC 200 U/ML
50 INJECTION, SOLUTION SUBCUTANEOUS NIGHTLY
Qty: 6 ADJUSTABLE DOSE PRE-FILLED PEN SYRINGE | Refills: 11 | Status: SHIPPED
Start: 2024-02-22

## 2024-02-22 RX ORDER — ACYCLOVIR 400 MG/1
TABLET ORAL
Qty: 3 EACH | Refills: 11
Start: 2024-02-22

## 2024-02-22 RX ORDER — ACYCLOVIR 400 MG/1
TABLET ORAL
Qty: 1 EACH | Refills: 0
Start: 2024-02-22

## 2024-02-22 NOTE — PATIENT INSTRUCTIONS
- Decrease Tresiba to 50 units every night    - Start Farxiga 10mg every morning    - Continue your next two injections of the Ozempic 0.25mg dose and then increase to 0.5mg dose

## 2024-02-27 RX ORDER — FLUTICASONE FUROATE 100 UG/1
1 POWDER RESPIRATORY (INHALATION) DAILY
Qty: 30 EACH | Refills: 3 | Status: SHIPPED | OUTPATIENT
Start: 2024-02-27 | End: 2024-02-27 | Stop reason: SDUPTHER

## 2024-02-27 RX ORDER — FLUTICASONE FUROATE 100 UG/1
1 POWDER RESPIRATORY (INHALATION) DAILY
Qty: 3 EACH | Refills: 3 | Status: SHIPPED | OUTPATIENT
Start: 2024-02-27

## 2024-03-11 NOTE — PROGRESS NOTES
none    Lifestyle modification(s):  - none    Medication Adherence/Access:  - Endorses adherence to current regimen?: Yes    Vitals/Labs:  No results found for: \"HBA1C\"  Hemoglobin A1C   Date Value Ref Range Status   01/30/2024 10.4 (H) 4.8 - 5.6 % Final     Comment:                 Prediabetes: 5.7 - 6.4           Diabetes: >6.4           Glycemic control for adults with diabetes: <7.0     10/31/2023 9.6 (H) 4.2 - 5.6 % Final     Comment:     (NOTE)  HbA1C Interpretive Ranges  <5.7              Normal  5.7 - 6.4         Consider Prediabetes  >6.5              Consider Diabetes     06/30/2023 9.8 (H) 4.8 - 5.6 % Final     Comment:              Prediabetes: 5.7 - 6.4           Diabetes: >6.4           Glycemic control for adults with diabetes: <7.0       Hemoglobin A1C, POC   Date Value Ref Range Status   06/03/2021 8.2 % Final       Screenings/Prevention Parameters:  -Diabetic Eye and Foot Exams:      Diabetes Management   Topic Date Due    Diabetic foot exam  12/28/2018     -Microalbumin / Creatinine ratio:       Lab Results   Component Value Date/Time    MICROALBUR 8.3 11/03/2022 11:30 AM    LABCREA 104.8 01/30/2024 12:00 AM        Lab Results   Component Value Date    MALBCR 305 (H) 01/30/2024    MALBCR 38 (H) 11/07/2023    MALBCR 12 11/03/2022      No results found for: \"MALBCREARAT\"  -Immunizations:      Immunization History   Administered Date(s) Administered    Influenza, FLUAD, (age 65 y+), Adjuvanted, 0.5mL 11/05/2021    Pneumococcal, PPSV23, PNEUMOVAX 23, (age 2y+), SC/IM, 0.5mL 06/03/2021    Zoster Recombinant (Shingrix) 04/16/2020, 11/11/2020       Additional Laboratory Parameters of Interest:   Estimation of renal function:  Lab Results   Component Value Date/Time    LABGLOM 50 01/30/2024 12:00 AM    LABGLOM 44 10/31/2023 11:32 AM    LABGLOM 52 06/30/2023 12:00 AM    LABGLOM 48 03/17/2023 12:00 AM    GFRAA 57 02/04/2022 11:22 AM    GFRAA 57 09/30/2021 12:00 AM    GFRAA 51 05/27/2021 08:59 AM     Wt

## 2024-03-14 ENCOUNTER — PHARMACY VISIT (OUTPATIENT)
Age: 69
End: 2024-03-14

## 2024-03-14 DIAGNOSIS — E11.21 TYPE 2 DIABETES WITH NEPHROPATHY (HCC): Primary | ICD-10-CM

## 2024-03-14 RX ORDER — ACYCLOVIR 400 MG/1
TABLET ORAL
Qty: 3 EACH | Refills: 11 | Status: SHIPPED | OUTPATIENT
Start: 2024-03-14

## 2024-03-14 RX ORDER — INSULIN DEGLUDEC 200 U/ML
45 INJECTION, SOLUTION SUBCUTANEOUS NIGHTLY
Qty: 6 ADJUSTABLE DOSE PRE-FILLED PEN SYRINGE | Refills: 11 | Status: SHIPPED
Start: 2024-03-14 | End: 2024-03-14 | Stop reason: DRUGHIGH

## 2024-03-14 RX ORDER — ACYCLOVIR 400 MG/1
TABLET ORAL
Qty: 1 EACH | Refills: 0 | Status: SHIPPED | OUTPATIENT
Start: 2024-03-14

## 2024-03-14 RX ORDER — INSULIN DEGLUDEC 200 U/ML
44 INJECTION, SOLUTION SUBCUTANEOUS NIGHTLY
Qty: 6 ADJUSTABLE DOSE PRE-FILLED PEN SYRINGE | Refills: 11 | Status: SHIPPED
Start: 2024-03-14

## 2024-03-14 NOTE — PATIENT INSTRUCTIONS
- Decrease Tresiba to 44 units every night at bedtime    ** Contact the office if you have issues filling your Dexcom G7 at the pharmacy **

## 2024-04-29 ENCOUNTER — NURSE ONLY (OUTPATIENT)
Age: 69
End: 2024-04-29

## 2024-04-29 DIAGNOSIS — E11.22 TYPE 2 DIABETES MELLITUS WITH STAGE 3A CHRONIC KIDNEY DISEASE, WITH LONG-TERM CURRENT USE OF INSULIN (HCC): ICD-10-CM

## 2024-04-29 DIAGNOSIS — N18.31 TYPE 2 DIABETES MELLITUS WITH STAGE 3A CHRONIC KIDNEY DISEASE, WITH LONG-TERM CURRENT USE OF INSULIN (HCC): ICD-10-CM

## 2024-04-29 DIAGNOSIS — Z79.4 TYPE 2 DIABETES MELLITUS WITH STAGE 3A CHRONIC KIDNEY DISEASE, WITH LONG-TERM CURRENT USE OF INSULIN (HCC): ICD-10-CM

## 2024-04-29 DIAGNOSIS — I10 ESSENTIAL (PRIMARY) HYPERTENSION: ICD-10-CM

## 2024-04-29 DIAGNOSIS — E55.9 VITAMIN D DEFICIENCY: ICD-10-CM

## 2024-04-29 DIAGNOSIS — E78.00 PURE HYPERCHOLESTEROLEMIA, UNSPECIFIED: ICD-10-CM

## 2024-04-30 LAB
25(OH)D3+25(OH)D2 SERPL-MCNC: 57.7 NG/ML (ref 30–100)
ALBUMIN SERPL-MCNC: 4.2 G/DL (ref 3.9–4.9)
ALBUMIN/GLOB SERPL: 1.3 {RATIO} (ref 1.2–2.2)
ALP SERPL-CCNC: 138 IU/L (ref 44–121)
ALT SERPL-CCNC: 15 IU/L (ref 0–32)
AST SERPL-CCNC: 18 IU/L (ref 0–40)
BASOPHILS # BLD AUTO: 0 X10E3/UL (ref 0–0.2)
BASOPHILS NFR BLD AUTO: 1 %
BILIRUB SERPL-MCNC: <0.2 MG/DL (ref 0–1.2)
BUN SERPL-MCNC: 37 MG/DL (ref 8–27)
BUN/CREAT SERPL: 25 (ref 12–28)
CALCIUM SERPL-MCNC: 10 MG/DL (ref 8.7–10.3)
CHLORIDE SERPL-SCNC: 100 MMOL/L (ref 96–106)
CHOLEST SERPL-MCNC: 243 MG/DL (ref 100–199)
CO2 SERPL-SCNC: 22 MMOL/L (ref 20–29)
CREAT SERPL-MCNC: 1.47 MG/DL (ref 0.57–1)
EGFRCR SERPLBLD CKD-EPI 2021: 39 ML/MIN/1.73
EOSINOPHIL # BLD AUTO: 0.3 X10E3/UL (ref 0–0.4)
EOSINOPHIL NFR BLD AUTO: 4 %
ERYTHROCYTE [DISTWIDTH] IN BLOOD BY AUTOMATED COUNT: 15.2 % (ref 11.7–15.4)
GLOBULIN SER CALC-MCNC: 3.2 G/DL (ref 1.5–4.5)
GLUCOSE SERPL-MCNC: 107 MG/DL (ref 70–99)
HBA1C MFR BLD: 8.5 % (ref 4.8–5.6)
HCT VFR BLD AUTO: 39.1 % (ref 34–46.6)
HDLC SERPL-MCNC: 69 MG/DL
HGB BLD-MCNC: 12.3 G/DL (ref 11.1–15.9)
IMM GRANULOCYTES # BLD AUTO: 0.1 X10E3/UL (ref 0–0.1)
IMM GRANULOCYTES NFR BLD AUTO: 1 %
LDLC SERPL CALC-MCNC: 153 MG/DL (ref 0–99)
LYMPHOCYTES # BLD AUTO: 2.9 X10E3/UL (ref 0.7–3.1)
LYMPHOCYTES NFR BLD AUTO: 34 %
MCH RBC QN AUTO: 24.6 PG (ref 26.6–33)
MCHC RBC AUTO-ENTMCNC: 31.5 G/DL (ref 31.5–35.7)
MCV RBC AUTO: 78 FL (ref 79–97)
MONOCYTES # BLD AUTO: 0.8 X10E3/UL (ref 0.1–0.9)
MONOCYTES NFR BLD AUTO: 9 %
NEUTROPHILS # BLD AUTO: 4.6 X10E3/UL (ref 1.4–7)
NEUTROPHILS NFR BLD AUTO: 51 %
PLATELET # BLD AUTO: 328 X10E3/UL (ref 150–450)
POTASSIUM SERPL-SCNC: 5.1 MMOL/L (ref 3.5–5.2)
PROT SERPL-MCNC: 7.4 G/DL (ref 6–8.5)
RBC # BLD AUTO: 5.01 X10E6/UL (ref 3.77–5.28)
SODIUM SERPL-SCNC: 140 MMOL/L (ref 134–144)
SPECIMEN STATUS REPORT: NORMAL
TRIGL SERPL-MCNC: 119 MG/DL (ref 0–149)
VLDLC SERPL CALC-MCNC: 21 MG/DL (ref 5–40)
WBC # BLD AUTO: 8.7 X10E3/UL (ref 3.4–10.8)

## 2024-05-06 ENCOUNTER — OFFICE VISIT (OUTPATIENT)
Age: 69
End: 2024-05-06
Payer: MEDICARE

## 2024-05-06 VITALS
BODY MASS INDEX: 35.82 KG/M2 | TEMPERATURE: 98.4 F | SYSTOLIC BLOOD PRESSURE: 100 MMHG | WEIGHT: 215 LBS | DIASTOLIC BLOOD PRESSURE: 54 MMHG | HEIGHT: 65 IN | OXYGEN SATURATION: 98 % | HEART RATE: 82 BPM | RESPIRATION RATE: 20 BRPM

## 2024-05-06 DIAGNOSIS — I10 ESSENTIAL (PRIMARY) HYPERTENSION: ICD-10-CM

## 2024-05-06 DIAGNOSIS — E66.01 SEVERE OBESITY (BMI 35.0-39.9) WITH COMORBIDITY (HCC): ICD-10-CM

## 2024-05-06 DIAGNOSIS — Z79.4 TYPE 2 DIABETES MELLITUS WITH STAGE 3B CHRONIC KIDNEY DISEASE, WITH LONG-TERM CURRENT USE OF INSULIN (HCC): Primary | ICD-10-CM

## 2024-05-06 DIAGNOSIS — N18.32 TYPE 2 DIABETES MELLITUS WITH STAGE 3B CHRONIC KIDNEY DISEASE, WITH LONG-TERM CURRENT USE OF INSULIN (HCC): Primary | ICD-10-CM

## 2024-05-06 DIAGNOSIS — E11.22 TYPE 2 DIABETES MELLITUS WITH STAGE 3B CHRONIC KIDNEY DISEASE, WITH LONG-TERM CURRENT USE OF INSULIN (HCC): Primary | ICD-10-CM

## 2024-05-06 DIAGNOSIS — J45.30 MILD PERSISTENT ASTHMA WITHOUT COMPLICATION: ICD-10-CM

## 2024-05-06 DIAGNOSIS — E78.00 PURE HYPERCHOLESTEROLEMIA, UNSPECIFIED: ICD-10-CM

## 2024-05-06 DIAGNOSIS — E55.9 VITAMIN D DEFICIENCY: ICD-10-CM

## 2024-05-06 PROCEDURE — G8417 CALC BMI ABV UP PARAM F/U: HCPCS | Performed by: INTERNAL MEDICINE

## 2024-05-06 PROCEDURE — G8427 DOCREV CUR MEDS BY ELIG CLIN: HCPCS | Performed by: INTERNAL MEDICINE

## 2024-05-06 PROCEDURE — 3052F HG A1C>EQUAL 8.0%<EQUAL 9.0%: CPT | Performed by: INTERNAL MEDICINE

## 2024-05-06 PROCEDURE — 2022F DILAT RTA XM EVC RTNOPTHY: CPT | Performed by: INTERNAL MEDICINE

## 2024-05-06 PROCEDURE — 1123F ACP DISCUSS/DSCN MKR DOCD: CPT | Performed by: INTERNAL MEDICINE

## 2024-05-06 PROCEDURE — 3074F SYST BP LT 130 MM HG: CPT | Performed by: INTERNAL MEDICINE

## 2024-05-06 PROCEDURE — G8400 PT W/DXA NO RESULTS DOC: HCPCS | Performed by: INTERNAL MEDICINE

## 2024-05-06 PROCEDURE — 3078F DIAST BP <80 MM HG: CPT | Performed by: INTERNAL MEDICINE

## 2024-05-06 PROCEDURE — 1090F PRES/ABSN URINE INCON ASSESS: CPT | Performed by: INTERNAL MEDICINE

## 2024-05-06 PROCEDURE — 3017F COLORECTAL CA SCREEN DOC REV: CPT | Performed by: INTERNAL MEDICINE

## 2024-05-06 PROCEDURE — 1036F TOBACCO NON-USER: CPT | Performed by: INTERNAL MEDICINE

## 2024-05-06 PROCEDURE — 99214 OFFICE O/P EST MOD 30 MIN: CPT | Performed by: INTERNAL MEDICINE

## 2024-05-06 SDOH — ECONOMIC STABILITY: FOOD INSECURITY: WITHIN THE PAST 12 MONTHS, YOU WORRIED THAT YOUR FOOD WOULD RUN OUT BEFORE YOU GOT MONEY TO BUY MORE.: PATIENT DECLINED

## 2024-05-06 SDOH — ECONOMIC STABILITY: HOUSING INSECURITY
IN THE LAST 12 MONTHS, WAS THERE A TIME WHEN YOU DID NOT HAVE A STEADY PLACE TO SLEEP OR SLEPT IN A SHELTER (INCLUDING NOW)?: PATIENT DECLINED

## 2024-05-06 SDOH — ECONOMIC STABILITY: FOOD INSECURITY: WITHIN THE PAST 12 MONTHS, THE FOOD YOU BOUGHT JUST DIDN'T LAST AND YOU DIDN'T HAVE MONEY TO GET MORE.: PATIENT DECLINED

## 2024-05-06 SDOH — ECONOMIC STABILITY: INCOME INSECURITY: HOW HARD IS IT FOR YOU TO PAY FOR THE VERY BASICS LIKE FOOD, HOUSING, MEDICAL CARE, AND HEATING?: PATIENT DECLINED

## 2024-05-06 NOTE — PROGRESS NOTES
Christina Nuñez presents today for   Chief Complaint   Patient presents with    Cholesterol Problem    Hypertension    Diabetes     Follow up            \"Have you been to the ER, urgent care clinic since your last visit?  Hospitalized since your last visit?\"    NO    “Have you seen or consulted any other health care providers outside of [facility] since your last visit?”    Yes, [facility] and Dr. Avila.             
tolerate statins due to myalgias. She will work on weight  loss    ICD-10-CM    1. Type 2 diabetes mellitus with stage 3b chronic kidney disease, with long-term current use of insulin ([facility])  E11.22 Hemoglobin A1C    N18.32     Z79.4       2. Pure hypercholesterolemia, unspecified  E78.00 Lipid Panel      3. Essential (primary) hypertension  I10 Comprehensive Metabolic Panel      4. Severe obesity (BMI 35.0-39.9) with comorbidity ([facility])  E66.01 Patient has been losing some weight now that she is on a GLP-1 she will continue to work on diet and cutting back starches.      5. Mild persistent asthma without complication  J45.30 Well-controlled Arunity Ellipta      6. Vitamin D deficiency  E55.9 Well-controlled on current supplementation           Return in about 3 months (around 8/6/2024) for labs 1 week before.              Diabetic issues reviewed with her: diabetic diet discussed in detail, and low cholesterol diet, weight control and daily exercise discussed.               Reviewed plan of care. Patient has provided input and agrees with goals.

## 2024-07-09 ENCOUNTER — TELEPHONE (OUTPATIENT)
Facility: CLINIC | Age: 69
End: 2024-07-09

## 2024-07-09 NOTE — TELEPHONE ENCOUNTER
Pharmacy Progress Note - Telephone Call    Ms. Christina Nuñez 68 y.o. was contacted via an outbound telephone call today to reschedule their missed appointment for PharmD diabetes management and education per referral from Rosendo Zaragoza MD.    Please call pt to reschedule.  Please close encounter after scheduled.  If unable to reach pt after 3 documented attempts, please close encounter.    Thank you,    Jarred King, PharmD, BCACP, BC-ADM      For Pharmacy Admin Tracking Only    Program: Medical Group  CPA in place:  Yes  Time Spent (min): 5

## 2024-08-06 NOTE — PROGRESS NOTES
Pharmacy Progress Note - Diabetes Management       Assessment / Plan:   Diabetes Management:  Per ADA guidelines, Pt's A1c is not at goal of < 7%.  Pt's AGP report from past 14 days shows time in target range 84%, average glucose 149 mg/dL, GMI 6.9%.  Her nocturnal hypoglycemia has resolved even on the days where she does not have her fruit at bedtime.  Her fruit at bedtime is causing a persistent elevation in her BG values so will have her either remove it from her diet or add a protein with healthy fats like peanut butter.  She continues to have sugary drinks on occasion so advised to switch to sugar-free options.  She is having large post-prandial elevations so will increase her Ozempic dose to 1mg weekly.  She will utilize her 0.5mg pens for now until the PAP program mails her new dose.  Will reassess with CGM data in 6 weeks.     Nutrition/Lifestyle Modifications:  - Educated pt on the importance of moderating carbohydrate intake. Reviewed sources of carbohydrates and method to help determine appropriate portion sizes (e.g., Diabetes Plate Method).  - Advised patient to avoid sugar-sweetened beverages and replace with water or diet/zero sugar option.  - Recommend ~30 minutes consistent, moderately intensive, exercise/day or ~150 minutes/week. Start small, stay consistent, and increase length and types of exercise, as tolerated.       Patient will return to clinic in 6 week(s) for follow up.        S/O: Ms. Christina Nuñez, a 68 y.o. female referred by Rosendo Zaragoza MD,  has a past medical history of CTS (carpal tunnel syndrome), DJD (degenerative joint disease) of knee, Glaucoma, Hand paresthesia, Hypercholesterolemia, Hypertension, and Pleurisy.  Pt was seen today for diabetes management.  Patient's last A1c was:   Hemoglobin A1C   Date Value Ref Range Status   04/29/2024 8.5 (H) 4.8 - 5.6 % Final     Comment:                 Prediabetes: 5.7 - 6.4           Diabetes: >6.4           Glycemic control for

## 2024-08-07 ENCOUNTER — PHARMACY VISIT (OUTPATIENT)
Facility: CLINIC | Age: 69
End: 2024-08-07

## 2024-08-07 DIAGNOSIS — Z79.4 TYPE 2 DIABETES MELLITUS WITH STAGE 3B CHRONIC KIDNEY DISEASE, WITH LONG-TERM CURRENT USE OF INSULIN (HCC): Primary | ICD-10-CM

## 2024-08-07 DIAGNOSIS — E11.22 TYPE 2 DIABETES MELLITUS WITH STAGE 3B CHRONIC KIDNEY DISEASE, WITH LONG-TERM CURRENT USE OF INSULIN (HCC): Primary | ICD-10-CM

## 2024-08-07 DIAGNOSIS — N18.32 TYPE 2 DIABETES MELLITUS WITH STAGE 3B CHRONIC KIDNEY DISEASE, WITH LONG-TERM CURRENT USE OF INSULIN (HCC): Primary | ICD-10-CM

## 2024-08-07 RX ORDER — SEMAGLUTIDE 0.68 MG/ML
1 INJECTION, SOLUTION SUBCUTANEOUS
Qty: 12 ML | Refills: 3 | Status: SHIPPED | OUTPATIENT
Start: 2024-08-07

## 2024-08-07 NOTE — PATIENT INSTRUCTIONS
- Increase Ozempic to 1mg weekly by injecting two 0.5mg doses from the pens you currently have

## 2024-08-08 DIAGNOSIS — E11.22 TYPE 2 DIABETES MELLITUS WITH STAGE 3A CHRONIC KIDNEY DISEASE, WITH LONG-TERM CURRENT USE OF INSULIN (HCC): ICD-10-CM

## 2024-08-08 DIAGNOSIS — Z79.4 TYPE 2 DIABETES MELLITUS WITH STAGE 3A CHRONIC KIDNEY DISEASE, WITH LONG-TERM CURRENT USE OF INSULIN (HCC): ICD-10-CM

## 2024-08-08 DIAGNOSIS — N18.31 TYPE 2 DIABETES MELLITUS WITH STAGE 3A CHRONIC KIDNEY DISEASE, WITH LONG-TERM CURRENT USE OF INSULIN (HCC): ICD-10-CM

## 2024-08-08 RX ORDER — DAPAGLIFLOZIN 10 MG/1
10 TABLET, FILM COATED ORAL EVERY MORNING
Qty: 90 TABLET | Refills: 0 | Status: SHIPPED | OUTPATIENT
Start: 2024-08-08

## 2024-08-13 LAB
ALBUMIN SERPL-MCNC: 4 G/DL (ref 3.9–4.9)
ALP SERPL-CCNC: 130 IU/L (ref 44–121)
ALT SERPL-CCNC: 11 IU/L (ref 0–32)
AST SERPL-CCNC: 13 IU/L (ref 0–40)
BILIRUB SERPL-MCNC: <0.2 MG/DL (ref 0–1.2)
BUN SERPL-MCNC: 35 MG/DL (ref 8–27)
BUN/CREAT SERPL: 32 (ref 12–28)
CALCIUM SERPL-MCNC: 9.6 MG/DL (ref 8.7–10.3)
CHLORIDE SERPL-SCNC: 104 MMOL/L (ref 96–106)
CHOLEST SERPL-MCNC: 223 MG/DL (ref 100–199)
CO2 SERPL-SCNC: 26 MMOL/L (ref 20–29)
CREAT SERPL-MCNC: 1.08 MG/DL (ref 0.57–1)
EGFRCR SERPLBLD CKD-EPI 2021: 56 ML/MIN/1.73
GLOBULIN SER CALC-MCNC: 3.1 G/DL (ref 1.5–4.5)
GLUCOSE SERPL-MCNC: 88 MG/DL (ref 70–99)
HBA1C MFR BLD: 7.1 % (ref 4.8–5.6)
HDLC SERPL-MCNC: 59 MG/DL
LDLC SERPL CALC-MCNC: 144 MG/DL (ref 0–99)
POTASSIUM SERPL-SCNC: 4.7 MMOL/L (ref 3.5–5.2)
PROT SERPL-MCNC: 7.1 G/DL (ref 6–8.5)
SODIUM SERPL-SCNC: 143 MMOL/L (ref 134–144)
SPECIMEN STATUS REPORT: NORMAL
TRIGL SERPL-MCNC: 111 MG/DL (ref 0–149)
VLDLC SERPL CALC-MCNC: 20 MG/DL (ref 5–40)

## 2024-08-19 ENCOUNTER — OFFICE VISIT (OUTPATIENT)
Facility: CLINIC | Age: 69
End: 2024-08-19
Payer: MEDICARE

## 2024-08-19 VITALS
SYSTOLIC BLOOD PRESSURE: 111 MMHG | HEART RATE: 91 BPM | WEIGHT: 211 LBS | BODY MASS INDEX: 35.16 KG/M2 | HEIGHT: 65 IN | RESPIRATION RATE: 20 BRPM | DIASTOLIC BLOOD PRESSURE: 52 MMHG | TEMPERATURE: 98.1 F | OXYGEN SATURATION: 95 %

## 2024-08-19 DIAGNOSIS — E11.22 TYPE 2 DIABETES MELLITUS WITH STAGE 3A CHRONIC KIDNEY DISEASE, WITH LONG-TERM CURRENT USE OF INSULIN (HCC): Primary | ICD-10-CM

## 2024-08-19 DIAGNOSIS — N18.31 TYPE 2 DIABETES MELLITUS WITH STAGE 3A CHRONIC KIDNEY DISEASE, WITH LONG-TERM CURRENT USE OF INSULIN (HCC): Primary | ICD-10-CM

## 2024-08-19 DIAGNOSIS — I10 ESSENTIAL (PRIMARY) HYPERTENSION: ICD-10-CM

## 2024-08-19 DIAGNOSIS — Z79.4 TYPE 2 DIABETES MELLITUS WITH STAGE 3A CHRONIC KIDNEY DISEASE, WITH LONG-TERM CURRENT USE OF INSULIN (HCC): Primary | ICD-10-CM

## 2024-08-19 DIAGNOSIS — E66.01 SEVERE OBESITY (BMI 35.0-39.9) WITH COMORBIDITY (HCC): ICD-10-CM

## 2024-08-19 DIAGNOSIS — E78.00 PURE HYPERCHOLESTEROLEMIA, UNSPECIFIED: ICD-10-CM

## 2024-08-19 PROCEDURE — 3017F COLORECTAL CA SCREEN DOC REV: CPT | Performed by: INTERNAL MEDICINE

## 2024-08-19 PROCEDURE — 1123F ACP DISCUSS/DSCN MKR DOCD: CPT | Performed by: INTERNAL MEDICINE

## 2024-08-19 PROCEDURE — 3074F SYST BP LT 130 MM HG: CPT | Performed by: INTERNAL MEDICINE

## 2024-08-19 PROCEDURE — 3051F HG A1C>EQUAL 7.0%<8.0%: CPT | Performed by: INTERNAL MEDICINE

## 2024-08-19 PROCEDURE — G8417 CALC BMI ABV UP PARAM F/U: HCPCS | Performed by: INTERNAL MEDICINE

## 2024-08-19 PROCEDURE — 1090F PRES/ABSN URINE INCON ASSESS: CPT | Performed by: INTERNAL MEDICINE

## 2024-08-19 PROCEDURE — 3078F DIAST BP <80 MM HG: CPT | Performed by: INTERNAL MEDICINE

## 2024-08-19 PROCEDURE — G8400 PT W/DXA NO RESULTS DOC: HCPCS | Performed by: INTERNAL MEDICINE

## 2024-08-19 PROCEDURE — G8427 DOCREV CUR MEDS BY ELIG CLIN: HCPCS | Performed by: INTERNAL MEDICINE

## 2024-08-19 PROCEDURE — 99214 OFFICE O/P EST MOD 30 MIN: CPT | Performed by: INTERNAL MEDICINE

## 2024-08-19 PROCEDURE — 1036F TOBACCO NON-USER: CPT | Performed by: INTERNAL MEDICINE

## 2024-08-19 PROCEDURE — 2022F DILAT RTA XM EVC RTNOPTHY: CPT | Performed by: INTERNAL MEDICINE

## 2024-08-19 NOTE — PROGRESS NOTES
Christina Nuñez presents today for   Chief Complaint   Patient presents with    Cholesterol Problem    Diabetes    Hypertension     3 month follow up            \"Have you been to the ER, urgent care clinic since your last visit?  Hospitalized since your last visit?\"    NO    “Have you seen or consulted any other health care providers outside of [facility] since your last visit?”    NO.

## 2024-08-19 NOTE — PROGRESS NOTES
Subjective:       Chief Complaint  The patient presents for follow up of diabetes, hypertension and high cholesterol.        NEY Nuñez is a 68 y.o. female seen for follow up of diabetes.   Shealso has hypertension and hyperlipidemia. Diabetes improving control  on Tresiba Insulin  40 units daily and  Ozempic1 mg and Farxiga 10 mg.  Patient is being comanaged with Pharm.D.  She is encouraged to increase her hydration significantly which will help the Farxiga to work  better to help control her chronic kidney disease stage 3a   and microalbuminuria.      Hypertension well controlled, no significant medication side effects noted, on Zestoretic 10/12.5 BID, hyperlipidemia poorly controlled, off crestor which she stopped due to myalgias. She may benefit from Repatha in the future. Pt has sleep apnea but does not use CPAP machine.      Diet and Lifestyle: not attempting to follow a low fat, low cholesterol diet, does not rigorously follow a diabetic diet, exercises sporadically    Home BP Monitoring: is not measured at home.    Diabetic Review of Systems - home glucose monitoring: is performed regularly, 4x/day.  Patient has Dexcom 7  for CGM.  She will follow-up with Pharm.D. to help with management    Other symptoms and concerns: pt needs to work on losing weight to improve her overall health her BMI of 35 puts her at increased risk of multiple medical problems.  Patient has been able to lose some weight now that she is on the GLP-1 and SGLT2.    Vit D deficiency is well controlled on current supplementation.      Pt continues to have knee pain due to arthritis.     Asthma is controlled on Arnuity Ellipta which the insurance covers.     Pt has had h/o uterine cancer and was followed by Dr Madrigal. She will set up follow up with Dr Estephanie Worthy in the future.       Current Outpatient Medications   Medication Sig    dapagliflozin (FARXIGA) 10 MG tablet Take 1 tablet by mouth every morning    Semaglutide,0.25

## 2024-08-23 ENCOUNTER — TELEPHONE (OUTPATIENT)
Facility: CLINIC | Age: 69
End: 2024-08-23

## 2024-09-17 ENCOUNTER — PHARMACY VISIT (OUTPATIENT)
Facility: CLINIC | Age: 69
End: 2024-09-17

## 2024-09-17 DIAGNOSIS — E11.21 TYPE 2 DIABETES WITH NEPHROPATHY (HCC): Primary | ICD-10-CM

## 2024-09-17 RX ORDER — INSULIN DEGLUDEC 200 U/ML
44 INJECTION, SOLUTION SUBCUTANEOUS NIGHTLY
Qty: 6 ADJUSTABLE DOSE PRE-FILLED PEN SYRINGE | Refills: 11 | Status: SHIPPED | OUTPATIENT
Start: 2024-09-17

## 2024-10-08 ENCOUNTER — TELEPHONE (OUTPATIENT)
Facility: CLINIC | Age: 69
End: 2024-10-08

## 2024-10-08 RX ORDER — LISINOPRIL/HYDROCHLOROTHIAZIDE 10-12.5 MG
1 TABLET ORAL 2 TIMES DAILY
Qty: 180 TABLET | Refills: 2 | Status: SHIPPED | OUTPATIENT
Start: 2024-10-08

## 2024-10-08 NOTE — TELEPHONE ENCOUNTER
Refill req  lisinopril-hydroCHLOROthiazide (PRINZIDE;ZESTORETIC) 10-12.5 MG per tablet       Pharmacy   [facility] - [place], [place] - [address] - P [phone] - F [phone] [[id number]]

## 2024-10-28 NOTE — PROGRESS NOTES
Pharmacy Progress Note - Diabetes Management       Assessment / Plan:   Diabetes Management:  Per ADA guidelines, Pt's A1c is not at goal of < 7%.  Pt's AGP report from the past 14 days shows time in target range 79%, average glucose 147 mg/dL, and GMI 6.8%.  Pt's basal control is adequate.  However, she continues to have some larger post-prandial elevations.  Encouraged to improve her diet to reduce these elevations.  Will likely increase her Ozempic to 2mg weekly with the upcoming PAP renewal to improve prandial control.  Will reassess with CGM data in 6 weeks.     Nutrition/Lifestyle Modifications:  - Educated pt on the importance of moderating carbohydrate intake. Reviewed sources of carbohydrates and method to help determine appropriate portion sizes (e.g., Diabetes Plate Method).  - Advised patient to avoid sugar-sweetened beverages and replace with water or diet/zero sugar option.  - Recommend ~30 minutes consistent, moderately intensive, exercise/day or ~150 minutes/week. Start small, stay consistent, and increase length and types of exercise, as tolerated.       Patient will return to clinic in 6 week(s) for follow up.        S/O: Ms. Christina Nuñez, a 69 y.o. female referred by Rosendo Zaragoza MD,  has a past medical history of CTS (carpal tunnel syndrome), DJD (degenerative joint disease) of knee, Glaucoma, Hand paresthesia, Hypercholesterolemia, Hypertension, and Pleurisy.  Pt was seen today for diabetes management.  Patient's last A1c was:   Hemoglobin A1C   Date Value Ref Range Status   08/12/2024 7.1 (H) 4.8 - 5.6 % Final     Comment:                 Prediabetes: 5.7 - 6.4           Diabetes: >6.4           Glycemic control for adults with diabetes: <7.0       Hemoglobin A1C, POC   Date Value Ref Range Status   06/03/2021 8.2 % Final       Interim update: Pt was last seen by me on 9/17/2024.  Per my prior note: Pt's A1c is not at goal of < 7%.  A1c improved from prior check.  Pt's AGP report from

## 2024-10-29 ENCOUNTER — PHARMACY VISIT (OUTPATIENT)
Facility: CLINIC | Age: 69
End: 2024-10-29

## 2024-10-29 DIAGNOSIS — E11.21 TYPE 2 DIABETES WITH NEPHROPATHY (HCC): Primary | ICD-10-CM

## 2024-12-04 RX ORDER — PEN NEEDLE, DIABETIC 32GX 5/32"
NEEDLE, DISPOSABLE MISCELLANEOUS
Qty: 100 EACH | Refills: 3 | Status: SHIPPED | OUTPATIENT
Start: 2024-12-04

## 2024-12-06 NOTE — PROGRESS NOTES
02/06/24 126/68        Lab Results   Component Value Date    CHOL 223 (H) 08/12/2024    CHOL 243 (H) 04/29/2024    CHOL 249 (H) 01/30/2024     Lab Results   Component Value Date    TRIG 111 08/12/2024    TRIG 119 04/29/2024    TRIG 131 01/30/2024     Lab Results   Component Value Date    HDL 59 08/12/2024    HDL 69 04/29/2024    HDL 76 01/30/2024     No components found for: \"LDLCHOLESTEROL\", \"LDLCALC\"     Social History     Tobacco Use    Smoking status: Never     Passive exposure: Never    Smokeless tobacco: Never   Substance Use Topics    Alcohol use: No         Calculated ASCVD Risk Score: The 10-year ASCVD risk score (Bernabe DE SANTIAGO, et al., 2019) is: 21.3%    Values used to calculate the score:      Age: 69 years      Sex: Female      Is Non- : Yes      Diabetic: Yes      Tobacco smoker: No      Systolic Blood Pressure: 111 mmHg      Is BP treated: Yes      HDL Cholesterol: 59 mg/dL      Total Cholesterol: 223 mg/dL    -Statin Safety Parameters:      Lab Results   Component Value Date    ALT 11 08/12/2024    AST 13 08/12/2024    ALKPHOS 130 (H) 08/12/2024    BILITOT <0.2 08/12/2024       Calculated estimated creatinine clearance: estimated creatinine clearance is 56 mL/min (A) (based on SCr of 1.08 mg/dL (H)).    Vital Signs Today:    There were no vitals taken for this visit.    Medications Discontinued During This Encounter   Medication Reason    Insulin Degludec (TRESIBA FLEXTOUCH) 200 UNIT/ML SOPN REORDER    Semaglutide,0.25 or 0.5MG/DOS, (OZEMPIC, 0.25 OR 0.5 MG/DOSE,) 2 MG/3ML SOPN DOSE ADJUSTMENT    dapagliflozin (FARXIGA) 10 MG tablet REORDER       Orders Placed This Encounter    Insulin Degludec (TRESIBA FLEXTOUCH) 200 UNIT/ML SOPN     Sig: Inject 40 Units into the skin at bedtime     Dispense:  6 Adjustable Dose Pre-filled Pen Syringe     Refill:  11    semaglutide, 2 MG/DOSE, (OZEMPIC, 2 MG/DOSE,) 8 MG/3ML SOPN sc injection     Sig: Inject 2 mg into the skin every 7 days PAP med

## 2024-12-10 ENCOUNTER — PHARMACY VISIT (OUTPATIENT)
Facility: CLINIC | Age: 69
End: 2024-12-10

## 2024-12-10 DIAGNOSIS — N18.31 TYPE 2 DIABETES MELLITUS WITH STAGE 3A CHRONIC KIDNEY DISEASE, WITH LONG-TERM CURRENT USE OF INSULIN (HCC): ICD-10-CM

## 2024-12-10 DIAGNOSIS — E11.21 TYPE 2 DIABETES WITH NEPHROPATHY (HCC): Primary | ICD-10-CM

## 2024-12-10 DIAGNOSIS — E11.22 TYPE 2 DIABETES MELLITUS WITH STAGE 3A CHRONIC KIDNEY DISEASE, WITH LONG-TERM CURRENT USE OF INSULIN (HCC): ICD-10-CM

## 2024-12-10 DIAGNOSIS — Z79.4 TYPE 2 DIABETES MELLITUS WITH STAGE 3A CHRONIC KIDNEY DISEASE, WITH LONG-TERM CURRENT USE OF INSULIN (HCC): ICD-10-CM

## 2024-12-10 RX ORDER — INSULIN DEGLUDEC 200 U/ML
40 INJECTION, SOLUTION SUBCUTANEOUS NIGHTLY
Qty: 6 ADJUSTABLE DOSE PRE-FILLED PEN SYRINGE | Refills: 11 | Status: SHIPPED | OUTPATIENT
Start: 2024-12-10

## 2024-12-10 RX ORDER — DAPAGLIFLOZIN 10 MG/1
10 TABLET, FILM COATED ORAL EVERY MORNING
Qty: 90 TABLET | Refills: 4 | Status: SHIPPED | OUTPATIENT
Start: 2024-12-10

## 2024-12-10 RX ORDER — SEMAGLUTIDE 2.68 MG/ML
2 INJECTION, SOLUTION SUBCUTANEOUS
Qty: 9 ML | Refills: 4
Start: 2024-12-10

## 2024-12-10 NOTE — PATIENT INSTRUCTIONS
- Decrease Tresiba to 40 units every night    - Increase Ozempic to 2mg weekly   * Use 2 injections of the 1mg dose until you receive the 2mg pens

## 2024-12-17 LAB
ALBUMIN SERPL-MCNC: 4.1 G/DL (ref 3.9–4.9)
ALP SERPL-CCNC: 127 IU/L (ref 44–121)
ALT SERPL-CCNC: 16 IU/L (ref 0–32)
AST SERPL-CCNC: 18 IU/L (ref 0–40)
BILIRUB SERPL-MCNC: <0.2 MG/DL (ref 0–1.2)
BUN SERPL-MCNC: 30 MG/DL (ref 8–27)
BUN/CREAT SERPL: 24 (ref 12–28)
CALCIUM SERPL-MCNC: 9.8 MG/DL (ref 8.7–10.3)
CHLORIDE SERPL-SCNC: 102 MMOL/L (ref 96–106)
CHOLEST SERPL-MCNC: 214 MG/DL (ref 100–199)
CO2 SERPL-SCNC: 23 MMOL/L (ref 20–29)
CREAT SERPL-MCNC: 1.27 MG/DL (ref 0.57–1)
EGFRCR SERPLBLD CKD-EPI 2021: 46 ML/MIN/1.73
GLOBULIN SER CALC-MCNC: 2.9 G/DL (ref 1.5–4.5)
GLUCOSE SERPL-MCNC: 90 MG/DL (ref 70–99)
HBA1C MFR BLD: 6.7 % (ref 4.8–5.6)
HDLC SERPL-MCNC: 60 MG/DL
LDLC SERPL CALC-MCNC: 134 MG/DL (ref 0–99)
POTASSIUM SERPL-SCNC: 4.8 MMOL/L (ref 3.5–5.2)
PROT SERPL-MCNC: 7 G/DL (ref 6–8.5)
SODIUM SERPL-SCNC: 143 MMOL/L (ref 134–144)
SPECIMEN STATUS REPORT: NORMAL
TRIGL SERPL-MCNC: 115 MG/DL (ref 0–149)
VLDLC SERPL CALC-MCNC: 20 MG/DL (ref 5–40)

## 2024-12-23 ENCOUNTER — OFFICE VISIT (OUTPATIENT)
Facility: CLINIC | Age: 69
End: 2024-12-23
Payer: MEDICARE

## 2024-12-23 VITALS
BODY MASS INDEX: 34.16 KG/M2 | WEIGHT: 205 LBS | RESPIRATION RATE: 18 BRPM | DIASTOLIC BLOOD PRESSURE: 54 MMHG | TEMPERATURE: 97.8 F | SYSTOLIC BLOOD PRESSURE: 126 MMHG | OXYGEN SATURATION: 100 % | HEART RATE: 84 BPM | HEIGHT: 65 IN

## 2024-12-23 DIAGNOSIS — Z79.4 TYPE 2 DIABETES MELLITUS WITH STAGE 3A CHRONIC KIDNEY DISEASE, WITH LONG-TERM CURRENT USE OF INSULIN (HCC): Primary | ICD-10-CM

## 2024-12-23 DIAGNOSIS — N18.31 TYPE 2 DIABETES MELLITUS WITH STAGE 3A CHRONIC KIDNEY DISEASE, WITH LONG-TERM CURRENT USE OF INSULIN (HCC): Primary | ICD-10-CM

## 2024-12-23 DIAGNOSIS — E78.00 PURE HYPERCHOLESTEROLEMIA, UNSPECIFIED: ICD-10-CM

## 2024-12-23 DIAGNOSIS — E55.9 VITAMIN D DEFICIENCY: ICD-10-CM

## 2024-12-23 DIAGNOSIS — I10 ESSENTIAL (PRIMARY) HYPERTENSION: ICD-10-CM

## 2024-12-23 DIAGNOSIS — E11.22 TYPE 2 DIABETES MELLITUS WITH STAGE 3A CHRONIC KIDNEY DISEASE, WITH LONG-TERM CURRENT USE OF INSULIN (HCC): Primary | ICD-10-CM

## 2024-12-23 PROCEDURE — 3044F HG A1C LEVEL LT 7.0%: CPT | Performed by: INTERNAL MEDICINE

## 2024-12-23 PROCEDURE — 1159F MED LIST DOCD IN RCRD: CPT | Performed by: INTERNAL MEDICINE

## 2024-12-23 PROCEDURE — 1036F TOBACCO NON-USER: CPT | Performed by: INTERNAL MEDICINE

## 2024-12-23 PROCEDURE — 3074F SYST BP LT 130 MM HG: CPT | Performed by: INTERNAL MEDICINE

## 2024-12-23 PROCEDURE — G8483 FLU IMM NO ADMIN DOC REA: HCPCS | Performed by: INTERNAL MEDICINE

## 2024-12-23 PROCEDURE — G8400 PT W/DXA NO RESULTS DOC: HCPCS | Performed by: INTERNAL MEDICINE

## 2024-12-23 PROCEDURE — 1160F RVW MEDS BY RX/DR IN RCRD: CPT | Performed by: INTERNAL MEDICINE

## 2024-12-23 PROCEDURE — G8427 DOCREV CUR MEDS BY ELIG CLIN: HCPCS | Performed by: INTERNAL MEDICINE

## 2024-12-23 PROCEDURE — 99214 OFFICE O/P EST MOD 30 MIN: CPT | Performed by: INTERNAL MEDICINE

## 2024-12-23 PROCEDURE — 1123F ACP DISCUSS/DSCN MKR DOCD: CPT | Performed by: INTERNAL MEDICINE

## 2024-12-23 PROCEDURE — 3017F COLORECTAL CA SCREEN DOC REV: CPT | Performed by: INTERNAL MEDICINE

## 2024-12-23 PROCEDURE — 1090F PRES/ABSN URINE INCON ASSESS: CPT | Performed by: INTERNAL MEDICINE

## 2024-12-23 PROCEDURE — 2022F DILAT RTA XM EVC RTNOPTHY: CPT | Performed by: INTERNAL MEDICINE

## 2024-12-23 PROCEDURE — G8417 CALC BMI ABV UP PARAM F/U: HCPCS | Performed by: INTERNAL MEDICINE

## 2024-12-23 PROCEDURE — 3078F DIAST BP <80 MM HG: CPT | Performed by: INTERNAL MEDICINE

## 2024-12-23 NOTE — PROGRESS NOTES
Christina Nuñez presents today for   Chief Complaint   Patient presents with    Cholesterol Problem    Diabetes    Hypertension    Follow-up Chronic Condition           \"Have you been to the ER, urgent care clinic since your last visit?  Hospitalized since your last visit?\"    NO    “Have you seen or consulted any other health care providers outside of [facility] since your last visit?”    NO            
04/29/2024 12:00 AM    HCT 39.1 04/29/2024 12:00 AM     04/29/2024 12:00 AM    MCV 78 04/29/2024 12:00 AM    MCH 24.6 04/29/2024 12:00 AM    MCHC 31.5 04/29/2024 12:00 AM    RDW 15.2 04/29/2024 12:00 AM    LYMPHOPCT 34 04/29/2024 12:00 AM    MONOPCT 9 04/29/2024 12:00 AM    EOSPCT 4 04/29/2024 12:00 AM    BASOPCT 1 04/29/2024 12:00 AM    MONOSABS 0.8 04/29/2024 12:00 AM    LYMPHSABS 2.9 04/29/2024 12:00 AM    EOSABS 0.3 04/29/2024 12:00 AM    BASOSABS 0.0 04/29/2024 12:00 AM     CMP:    Lab Results   Component Value Date/Time     12/16/2024 12:00 AM    K 4.8 12/16/2024 12:00 AM     12/16/2024 12:00 AM    CO2 23 12/16/2024 12:00 AM    BUN 30 12/16/2024 12:00 AM    CREATININE 1.27 12/16/2024 12:00 AM    GFRAA 57 02/04/2022 11:22 AM    AGRATIO 1.3 04/29/2024 12:00 AM    LABGLOM 46 12/16/2024 12:00 AM    LABGLOM 39 04/29/2024 12:00 AM    GLUCOSE 90 12/16/2024 12:00 AM    LABALBU 319.6 01/30/2024 12:00 AM    CALCIUM 9.8 12/16/2024 12:00 AM    BILITOT <0.2 12/16/2024 12:00 AM    ALKPHOS 127 12/16/2024 12:00 AM    AST 18 12/16/2024 12:00 AM    ALT 16 12/16/2024 12:00 AM     HgBA1c:    Lab Results   Component Value Date/Time    LABA1C 6.7 12/16/2024 12:00 AM     FLP:    Lab Results   Component Value Date/Time    TRIG 115 12/16/2024 12:00 AM    HDL 60 12/16/2024 12:00 AM           Assessment / Plan     Diabetes well controlled on Tresiba insulin 40 units daily along with Ozempic 2 mg and Farxiga 10 mg daily.  Farxiga also helps with her chronic kidney disease and microalbuminuria.  Patient encouraged to increase her hydration significantly  Hypertension well  controlled, on Zestoretic 10/12.5 BID.    Hyperlipidemia uncontrolled  patient unable to tolerate statins due to myalgias. She will work on weight  loss. She declines Zetia     ICD-10-CM    1. Type 2 diabetes mellitus with stage 3a chronic kidney disease, with long-term current use of insulin (HCC)  E11.22 CBC with Auto Differential    N18.31

## 2025-01-21 ENCOUNTER — TELEPHONE (OUTPATIENT)
Facility: CLINIC | Age: 70
End: 2025-01-21

## 2025-01-23 ENCOUNTER — HOSPITAL ENCOUNTER (OUTPATIENT)
Facility: HOSPITAL | Age: 70
Discharge: HOME | End: 2025-01-26
Attending: INTERNAL MEDICINE
Payer: MEDICARE

## 2025-01-23 VITALS — HEIGHT: 65 IN | WEIGHT: 205 LBS | BODY MASS INDEX: 34.16 KG/M2

## 2025-01-23 DIAGNOSIS — Z12.31 VISIT FOR SCREENING MAMMOGRAM: ICD-10-CM

## 2025-01-23 PROCEDURE — 77063 BREAST TOMOSYNTHESIS BI: CPT

## 2025-03-11 ENCOUNTER — PHARMACY VISIT (OUTPATIENT)
Facility: CLINIC | Age: 70
End: 2025-03-11

## 2025-03-11 DIAGNOSIS — E11.21 TYPE 2 DIABETES WITH NEPHROPATHY (HCC): Primary | ICD-10-CM

## 2025-03-11 NOTE — PROGRESS NOTES
Date Due    Diabetic foot exam  12/28/2018    Diabetic retinal exam  09/19/2024    Diabetic Alb to Cr ratio (uACR) test  01/30/2025     -Microalbumin / Creatinine ratio:     No results found for: \"MICROALBUR\", \"LABCREA\"     Lab Results   Component Value Date    ALBCREAT 305 (H) 01/30/2024      No components found for: \"MALBCREARAT\"  -Immunizations:      Immunization History   Administered Date(s) Administered    COVID-19, MODERNA, (age 12y+), IM, 50mcg/0.5mL 05/03/2024    Influenza, FLUAD, (age 65 y+), IM, Quadv, 0.5mL 11/05/2021    Pneumococcal, PPSV23, PNEUMOVAX 23, (age 2y+), SC/IM, 0.5mL 06/03/2021    Zoster Recombinant (Shingrix) 04/16/2020, 11/11/2020       Additional Laboratory Parameters of Interest:   Estimation of renal function:  Lab Results   Component Value Date/Time    LABGLOM 46 12/16/2024 12:00 AM    LABGLOM 56 08/12/2024 12:00 AM    LABGLOM 39 04/29/2024 12:00 AM    LABGLOM 50 01/30/2024 12:00 AM    LABGLOM 44 10/31/2023 11:32 AM    LABGLOM 52 06/30/2023 12:00 AM    GFRAA 57 02/04/2022 11:22 AM    GFRAA 57 09/30/2021 12:00 AM    GFRAA 51 05/27/2021 08:59 AM     Wt Readings from Last 3 Encounters:   01/23/25 93 kg (205 lb)   12/23/24 93 kg (205 lb)   08/19/24 95.7 kg (211 lb)     Ht Readings from Last 1 Encounters:   01/23/25 1.651 m (5' 5\")     Calculated estimated creatinine clearance: CrCl cannot be calculated (Unknown ideal weight.).    Vital Signs Today:    There were no vitals taken for this visit.    There are no discontinued medications.    No orders of the defined types were placed in this encounter.      Future Appointments   Date Time Provider Department Center   4/24/2025  9:15 AM [facility]   5/1/2025 11:00 AM Rosendo Zaragoza MD [facility] DEP   6/10/2025 10:30 AM Jarred King [facility]       Patient verbalized understanding of the information presented and all of the patient’s questions were answered.  AVS was handed to the patient. Patient

## 2025-03-16 DIAGNOSIS — E11.21 TYPE 2 DIABETES WITH NEPHROPATHY (HCC): ICD-10-CM

## 2025-03-21 RX ORDER — ACYCLOVIR 400 MG/1
TABLET ORAL
Qty: 3 EACH | Refills: 5 | Status: SHIPPED | OUTPATIENT
Start: 2025-03-21

## 2025-04-25 LAB
25(OH)D3+25(OH)D2 SERPL-MCNC: 42.8 NG/ML (ref 30–100)
ALBUMIN SERPL-MCNC: 4.2 G/DL (ref 3.9–4.9)
ALP SERPL-CCNC: 141 IU/L (ref 44–121)
ALT SERPL-CCNC: 17 IU/L (ref 0–32)
AST SERPL-CCNC: 18 IU/L (ref 0–40)
BASOPHILS # BLD AUTO: 0 X10E3/UL (ref 0–0.2)
BASOPHILS NFR BLD AUTO: 0 %
BILIRUB SERPL-MCNC: 0.2 MG/DL (ref 0–1.2)
BUN SERPL-MCNC: 32 MG/DL (ref 8–27)
BUN/CREAT SERPL: 29 (ref 12–28)
CALCIUM SERPL-MCNC: 9.8 MG/DL (ref 8.7–10.3)
CHLORIDE SERPL-SCNC: 99 MMOL/L (ref 96–106)
CHOLEST SERPL-MCNC: 230 MG/DL (ref 100–199)
CO2 SERPL-SCNC: 26 MMOL/L (ref 20–29)
CREAT SERPL-MCNC: 1.11 MG/DL (ref 0.57–1)
EGFRCR SERPLBLD CKD-EPI 2021: 54 ML/MIN/1.73
EOSINOPHIL # BLD AUTO: 0.4 X10E3/UL (ref 0–0.4)
EOSINOPHIL NFR BLD AUTO: 4 %
ERYTHROCYTE [DISTWIDTH] IN BLOOD BY AUTOMATED COUNT: 15.4 % (ref 11.7–15.4)
GLOBULIN SER CALC-MCNC: 3.3 G/DL (ref 1.5–4.5)
GLUCOSE SERPL-MCNC: 125 MG/DL (ref 70–99)
HBA1C MFR BLD: 7 % (ref 4.8–5.6)
HCT VFR BLD AUTO: 40.5 % (ref 34–46.6)
HDLC SERPL-MCNC: 65 MG/DL
HGB BLD-MCNC: 13.2 G/DL (ref 11.1–15.9)
IMM GRANULOCYTES # BLD AUTO: 0 X10E3/UL (ref 0–0.1)
IMM GRANULOCYTES NFR BLD AUTO: 0 %
LDLC SERPL CALC-MCNC: 146 MG/DL (ref 0–99)
LYMPHOCYTES # BLD AUTO: 2.8 X10E3/UL (ref 0.7–3.1)
LYMPHOCYTES NFR BLD AUTO: 32 %
MCH RBC QN AUTO: 25.9 PG (ref 26.6–33)
MCHC RBC AUTO-ENTMCNC: 32.6 G/DL (ref 31.5–35.7)
MCV RBC AUTO: 79 FL (ref 79–97)
MONOCYTES # BLD AUTO: 0.6 X10E3/UL (ref 0.1–0.9)
MONOCYTES NFR BLD AUTO: 7 %
NEUTROPHILS # BLD AUTO: 4.8 X10E3/UL (ref 1.4–7)
NEUTROPHILS NFR BLD AUTO: 57 %
PLATELET # BLD AUTO: 365 X10E3/UL (ref 150–450)
POTASSIUM SERPL-SCNC: 4.6 MMOL/L (ref 3.5–5.2)
PROT SERPL-MCNC: 7.5 G/DL (ref 6–8.5)
RBC # BLD AUTO: 5.1 X10E6/UL (ref 3.77–5.28)
SODIUM SERPL-SCNC: 140 MMOL/L (ref 134–144)
TRIGL SERPL-MCNC: 108 MG/DL (ref 0–149)
VLDLC SERPL CALC-MCNC: 19 MG/DL (ref 5–40)
WBC # BLD AUTO: 8.6 X10E3/UL (ref 3.4–10.8)

## 2025-05-01 ENCOUNTER — OFFICE VISIT (OUTPATIENT)
Facility: CLINIC | Age: 70
End: 2025-05-01
Payer: MEDICARE

## 2025-05-01 VITALS
BODY MASS INDEX: 32.65 KG/M2 | HEIGHT: 65 IN | RESPIRATION RATE: 20 BRPM | SYSTOLIC BLOOD PRESSURE: 104 MMHG | OXYGEN SATURATION: 100 % | TEMPERATURE: 97.5 F | WEIGHT: 196 LBS | HEART RATE: 82 BPM | DIASTOLIC BLOOD PRESSURE: 60 MMHG

## 2025-05-01 DIAGNOSIS — E78.00 PURE HYPERCHOLESTEROLEMIA, UNSPECIFIED: ICD-10-CM

## 2025-05-01 DIAGNOSIS — N18.31 TYPE 2 DIABETES MELLITUS WITH STAGE 3A CHRONIC KIDNEY DISEASE, WITH LONG-TERM CURRENT USE OF INSULIN (HCC): ICD-10-CM

## 2025-05-01 DIAGNOSIS — Z79.4 TYPE 2 DIABETES MELLITUS WITH STAGE 3A CHRONIC KIDNEY DISEASE, WITH LONG-TERM CURRENT USE OF INSULIN (HCC): ICD-10-CM

## 2025-05-01 DIAGNOSIS — Z78.9 STATIN INTOLERANCE: ICD-10-CM

## 2025-05-01 DIAGNOSIS — I10 ESSENTIAL (PRIMARY) HYPERTENSION: ICD-10-CM

## 2025-05-01 DIAGNOSIS — J45.30 MILD PERSISTENT ASTHMA WITHOUT COMPLICATION: ICD-10-CM

## 2025-05-01 DIAGNOSIS — E11.22 TYPE 2 DIABETES MELLITUS WITH STAGE 3A CHRONIC KIDNEY DISEASE, WITH LONG-TERM CURRENT USE OF INSULIN (HCC): ICD-10-CM

## 2025-05-01 DIAGNOSIS — E55.9 VITAMIN D DEFICIENCY: ICD-10-CM

## 2025-05-01 DIAGNOSIS — Z00.00 MEDICARE ANNUAL WELLNESS VISIT, SUBSEQUENT: Primary | ICD-10-CM

## 2025-05-01 PROBLEM — N18.32 TYPE 2 DIABETES MELLITUS WITH STAGE 3B CHRONIC KIDNEY DISEASE, WITH LONG-TERM CURRENT USE OF INSULIN (HCC): Status: RESOLVED | Noted: 2019-05-01 | Resolved: 2025-05-01

## 2025-05-01 PROCEDURE — 1123F ACP DISCUSS/DSCN MKR DOCD: CPT | Performed by: INTERNAL MEDICINE

## 2025-05-01 PROCEDURE — G0439 PPPS, SUBSEQ VISIT: HCPCS | Performed by: INTERNAL MEDICINE

## 2025-05-01 PROCEDURE — 1160F RVW MEDS BY RX/DR IN RCRD: CPT | Performed by: INTERNAL MEDICINE

## 2025-05-01 PROCEDURE — 2022F DILAT RTA XM EVC RTNOPTHY: CPT | Performed by: INTERNAL MEDICINE

## 2025-05-01 PROCEDURE — 3074F SYST BP LT 130 MM HG: CPT | Performed by: INTERNAL MEDICINE

## 2025-05-01 PROCEDURE — 1126F AMNT PAIN NOTED NONE PRSNT: CPT | Performed by: INTERNAL MEDICINE

## 2025-05-01 PROCEDURE — G8400 PT W/DXA NO RESULTS DOC: HCPCS | Performed by: INTERNAL MEDICINE

## 2025-05-01 PROCEDURE — 1090F PRES/ABSN URINE INCON ASSESS: CPT | Performed by: INTERNAL MEDICINE

## 2025-05-01 PROCEDURE — 3078F DIAST BP <80 MM HG: CPT | Performed by: INTERNAL MEDICINE

## 2025-05-01 PROCEDURE — G8417 CALC BMI ABV UP PARAM F/U: HCPCS | Performed by: INTERNAL MEDICINE

## 2025-05-01 PROCEDURE — 1159F MED LIST DOCD IN RCRD: CPT | Performed by: INTERNAL MEDICINE

## 2025-05-01 PROCEDURE — G8427 DOCREV CUR MEDS BY ELIG CLIN: HCPCS | Performed by: INTERNAL MEDICINE

## 2025-05-01 PROCEDURE — 99214 OFFICE O/P EST MOD 30 MIN: CPT | Performed by: INTERNAL MEDICINE

## 2025-05-01 PROCEDURE — 3017F COLORECTAL CA SCREEN DOC REV: CPT | Performed by: INTERNAL MEDICINE

## 2025-05-01 PROCEDURE — 3051F HG A1C>EQUAL 7.0%<8.0%: CPT | Performed by: INTERNAL MEDICINE

## 2025-05-01 PROCEDURE — 1036F TOBACCO NON-USER: CPT | Performed by: INTERNAL MEDICINE

## 2025-05-01 RX ORDER — ALBUTEROL SULFATE 90 UG/1
2 INHALANT RESPIRATORY (INHALATION) 4 TIMES DAILY PRN
Qty: 18 G | Refills: 1 | Status: SHIPPED | OUTPATIENT
Start: 2025-05-01

## 2025-05-01 SDOH — ECONOMIC STABILITY: FOOD INSECURITY: WITHIN THE PAST 12 MONTHS, THE FOOD YOU BOUGHT JUST DIDN'T LAST AND YOU DIDN'T HAVE MONEY TO GET MORE.: NEVER TRUE

## 2025-05-01 SDOH — ECONOMIC STABILITY: FOOD INSECURITY: WITHIN THE PAST 12 MONTHS, YOU WORRIED THAT YOUR FOOD WOULD RUN OUT BEFORE YOU GOT MONEY TO BUY MORE.: NEVER TRUE

## 2025-05-01 ASSESSMENT — PATIENT HEALTH QUESTIONNAIRE - PHQ9
SUM OF ALL RESPONSES TO PHQ QUESTIONS 1-9: 0
SUM OF ALL RESPONSES TO PHQ QUESTIONS 1-9: 0
2. FEELING DOWN, DEPRESSED OR HOPELESS: NOT AT ALL
SUM OF ALL RESPONSES TO PHQ QUESTIONS 1-9: 0
1. LITTLE INTEREST OR PLEASURE IN DOING THINGS: NOT AT ALL
SUM OF ALL RESPONSES TO PHQ QUESTIONS 1-9: 0

## 2025-05-01 ASSESSMENT — LIFESTYLE VARIABLES
HOW OFTEN DO YOU HAVE A DRINK CONTAINING ALCOHOL: NEVER
HOW MANY STANDARD DRINKS CONTAINING ALCOHOL DO YOU HAVE ON A TYPICAL DAY: PATIENT DOES NOT DRINK

## 2025-05-01 NOTE — PATIENT INSTRUCTIONS
breath.     Pain, pressure, or a strange feeling in the back, neck, jaw, or upper belly or in one or both shoulders or arms.     Lightheadedness or sudden weakness.     A fast or irregular heartbeat.   After you call 911, the  may tell you to chew 1 adult-strength or 2 to 4 low-dose aspirin. Wait for an ambulance. Do not try to drive yourself.  Watch closely for changes in your health, and be sure to contact your doctor if you have any problems.  Where can you learn more?  Go to https://www.Sh[employer].net/patientEd and enter F075 to learn more about \"A Healthy Heart: Care Instructions.\"  Current as of: July 31, 2024  Content Version: 14.4  © 4703-7566 [employer].   Care instructions adapted under license by "[employer]. If you have questions about a medical condition or this instruction, always ask your healthcare professional. [employer] disclaims any warranty or liability for your use of this information.    Personalized Preventive Plan for Christina Nuñez - 5/1/2025  Medicare offers a range of preventive health benefits. Some of the tests and screenings are paid in full while other may be subject to a deductible, co-insurance, and/or copay.  Some of these benefits include a comprehensive review of your medical history including lifestyle, illnesses that may run in your family, and various assessments and screenings as appropriate.  After reviewing your medical record and screening and assessments performed today your provider may have ordered immunizations, labs, imaging, and/or referrals for you.  A list of these orders (if applicable) as well as your Preventive Care list are included within your After Visit Summary for your review.

## 2025-05-01 NOTE — PROGRESS NOTES
Medicare Annual Wellness Visit    Christina Nuñez is here for Diabetes, Hypertension, Cholesterol Problem (4 month follow up ), and Medicare AWV    Assessment & Plan   Medicare annual wellness visit, subsequent  Type 2 diabetes mellitus with stage 3a chronic kidney disease, with long-term current use of insulin (HCC)  -     Hemoglobin A1C; Future  -     CBC with Auto Differential; Future  -     Albumin/Creatinine Ratio, Urine; Future  Pure hypercholesterolemia, unspecified  -     Lipid Panel; Future  Essential (primary) hypertension  -     Comprehensive Metabolic Panel; Future  Vitamin D deficiency  -     Vitamin D 25 Hydroxy; Future  Statin intolerance  Mild persistent asthma without complication     Return in about 6 months (around 11/1/2025) for labs 1 week before.     Subjective       Patient's complete Health Risk Assessment and screening values have been reviewed and are found in Flowsheets. The following problems were reviewed today and where indicated follow up appointments were made and/or referrals ordered.    Positive Risk Factor Screenings with Interventions:                Abnormal BMI (obese):  Body mass index is 32.62 kg/m². (!) Abnormal  Interventions:  low carbohydrate diet        Dentist Screen:  Have you seen the dentist within the past year?: (!) No    Intervention:  Advised to schedule with their dentist     Vision Screen:  Do you have difficulty driving, watching TV, or doing any of your daily activities because of your eyesight?: No  Have you had an eye exam within the past year?: (!) No  Interventions:   Patient encouraged to make appointment with their eye specialist      Advanced Directives:  Do you have a Living Will?: (!) No    Intervention:  has NO advanced directive - not interested in additional information                     Objective   Vitals:    05/01/25 1056   BP: 104/60   BP Site: Left Upper Arm   Patient Position: Sitting   BP Cuff Size: Large Adult   Pulse: 82   Resp: 20

## 2025-05-01 NOTE — PROGRESS NOTES
Christina Nuñez presents today for   Chief Complaint   Patient presents with    Diabetes    Hypertension    Cholesterol Problem     4 month follow up     Medicare AWV           \"Have you been to the ER, urgent care clinic since your last visit?  Hospitalized since your last visit?\"    NO    “Have you seen or consulted any other health care providers outside of [facility] since your last visit?”    NO

## 2025-05-01 NOTE — PROGRESS NOTES
Subjective:       Chief Complaint  The patient presents for follow up of diabetes, hypertension and high cholesterol.        NEY Nuñez is a 69 y.o. female seen for follow up of diabetes.   Shealso has hypertension and hyperlipidemia. Diabetes well controlled  on Tresiba Insulin  40 units daily and  Ozempic 2 mg and Farxiga 10 mg.  Patient is being comanaged with Pharm.D and getting the medications through pharmD program.  She is encouraged to increase her hydration significantly which will help the Farxiga to work  better to help control her chronic kidney disease stage 3a   and microalbuminuria.      Hypertension well controlled, no significant medication side effects noted, on Zestoretic 10/12.5 BID, hyperlipidemia poorly controlled, off crestor which she stopped due to myalgias. She may benefit from Repatha in the future. She did not want to try Zetia. Pt has sleep apnea but does not use CPAP machine.  She has lost over 30 pounds which probably has improved his sleep apnea.    Diet and Lifestyle: not attempting to follow a low fat, low cholesterol diet, does not rigorously follow a diabetic diet, exercises sporadically    Home BP Monitoring: is not measured at home.    Diabetic Review of Systems - home glucose monitoring: is performed regularly, 4x/day.  Patient has Dexcom 7  for CGM.  She will follow-up with Pharm.D. to help with management    Other symptoms and concerns: pt has done well with weight loss    Vit D deficiency is well controlled on current supplementation.      Pt continues to have knee pain due to arthritis.     Asthma is controlled on Arnuity Ellipta which the insurance covers however she rarely uses the medication since her weight loss. Will give her albuterol to use as needed..     Pt has had h/o uterine cancer treated in 2015 with  Dr Madrigal. She  saw Dr Estephanie Worthy in 8/2022 and was to f/u in 1 year. She is encouraged to make an appointment       Current Outpatient Medications

## 2025-06-06 NOTE — PROGRESS NOTES
Rosendo Bacon MD [facility] DEP       Patient verbalized understanding of the information presented and all of the patient’s questions were answered.  AVS was handed to the patient. Patient advised to call the office with any additional questions or concerns.    Notifications of recommendations will be sent to Rosendo Zaragoza MD for review.      Thank you for the consult,  Jarred King, PharmD, BCACP, BC-ADM        For Pharmacy Admin Tracking Only    Program: Medical Group  CPA in place:  Yes  Recommendation Provided To: Patient/Caregiver: 2 via In person  Intervention Detail: Adherence Monitorin and Scheduled Appointment  Intervention Accepted By: Patient/Caregiver: 2  Gap Closed?: No   Time Spent (min): 30

## 2025-06-10 ENCOUNTER — PHARMACY VISIT (OUTPATIENT)
Facility: CLINIC | Age: 70
End: 2025-06-10

## 2025-06-10 DIAGNOSIS — N18.31 TYPE 2 DIABETES MELLITUS WITH STAGE 3A CHRONIC KIDNEY DISEASE, WITH LONG-TERM CURRENT USE OF INSULIN (HCC): Primary | ICD-10-CM

## 2025-06-10 DIAGNOSIS — E11.22 TYPE 2 DIABETES MELLITUS WITH STAGE 3A CHRONIC KIDNEY DISEASE, WITH LONG-TERM CURRENT USE OF INSULIN (HCC): Primary | ICD-10-CM

## 2025-06-10 DIAGNOSIS — Z79.4 TYPE 2 DIABETES MELLITUS WITH STAGE 3A CHRONIC KIDNEY DISEASE, WITH LONG-TERM CURRENT USE OF INSULIN (HCC): Primary | ICD-10-CM

## 2025-07-16 NOTE — PROGRESS NOTES
Pharmacy Progress Note - Diabetes Management       Assessment / Plan:   Diabetes Management:  Per ADA guidelines, Pt's A1c is not at goal of < 7%.  Pt's glycemic control has worsened from the prior visit d/t her large fruit intake.  Her AGP report from the past 14 days shows time in target range 54%, average glucose 179 mg/dL, and GMI 7.6%.  Portion control gone over and handout given for fruit carb counting.  Will have her adjust her diet vs her medications.  Will reassess with CGM data in 5 weeks.     Nutrition/Lifestyle Modifications:  - Educated pt on the importance of moderating carbohydrate intake. Reviewed sources of carbohydrates and method to help determine appropriate portion sizes (e.g., Diabetes Plate Method).  - Advised patient to avoid sugar-sweetened beverages and replace with water or diet/zero sugar option.  - Recommend ~30 minutes consistent, moderately intensive, exercise/day or ~150 minutes/week. Start small, stay consistent, and increase length and types of exercise, as tolerated.       Patient will return to clinic in 5 week(s) for follow up.        S/O: Ms. Christina Nuñez, a 69 y.o. female referred by Rosendo Zaragoza MD,  has a past medical history of CTS (carpal tunnel syndrome), DJD (degenerative joint disease) of knee, Glaucoma, Hand paresthesia, Hypercholesterolemia, Hypertension, and Pleurisy.  Pt was seen today for diabetes management.  Patient's last A1c was:   Hemoglobin A1C   Date Value Ref Range Status   04/24/2025 7.0 (H) 4.8 - 5.6 % Final     Comment:                 Prediabetes: 5.7 - 6.4           Diabetes: >6.4           Glycemic control for adults with diabetes: <7.0       Hemoglobin A1C, POC   Date Value Ref Range Status   06/03/2021 8.2 % Final        Interim update: Pt was last seen by me on 6/10/2025.  Per my prior note: Pt's A1c is not at goal of < 7%.  Pt's A1c has slightly increased since the prior visit likely d/t her change in diet.  Her AGP report from the past 14

## 2025-07-18 DIAGNOSIS — N18.31 TYPE 2 DIABETES MELLITUS WITH STAGE 3A CHRONIC KIDNEY DISEASE, WITH LONG-TERM CURRENT USE OF INSULIN (HCC): ICD-10-CM

## 2025-07-18 DIAGNOSIS — Z79.4 TYPE 2 DIABETES MELLITUS WITH STAGE 3A CHRONIC KIDNEY DISEASE, WITH LONG-TERM CURRENT USE OF INSULIN (HCC): ICD-10-CM

## 2025-07-18 DIAGNOSIS — E11.22 TYPE 2 DIABETES MELLITUS WITH STAGE 3A CHRONIC KIDNEY DISEASE, WITH LONG-TERM CURRENT USE OF INSULIN (HCC): ICD-10-CM

## 2025-07-18 RX ORDER — DAPAGLIFLOZIN 10 MG/1
10 TABLET, FILM COATED ORAL EVERY MORNING
Qty: 90 TABLET | Refills: 4 | Status: SHIPPED | OUTPATIENT
Start: 2025-07-18

## 2025-07-22 ENCOUNTER — PHARMACY VISIT (OUTPATIENT)
Facility: CLINIC | Age: 70
End: 2025-07-22

## 2025-07-22 DIAGNOSIS — E11.22 TYPE 2 DIABETES MELLITUS WITH STAGE 3A CHRONIC KIDNEY DISEASE, WITH LONG-TERM CURRENT USE OF INSULIN (HCC): Primary | ICD-10-CM

## 2025-07-22 DIAGNOSIS — N18.31 TYPE 2 DIABETES MELLITUS WITH STAGE 3A CHRONIC KIDNEY DISEASE, WITH LONG-TERM CURRENT USE OF INSULIN (HCC): Primary | ICD-10-CM

## 2025-07-22 DIAGNOSIS — Z79.4 TYPE 2 DIABETES MELLITUS WITH STAGE 3A CHRONIC KIDNEY DISEASE, WITH LONG-TERM CURRENT USE OF INSULIN (HCC): Primary | ICD-10-CM

## 2025-07-28 RX ORDER — LISINOPRIL AND HYDROCHLOROTHIAZIDE 10; 12.5 MG/1; MG/1
1 TABLET ORAL 2 TIMES DAILY
Qty: 180 TABLET | Refills: 3 | Status: SHIPPED | OUTPATIENT
Start: 2025-07-28

## 2025-07-28 NOTE — TELEPHONE ENCOUNTER
PCP: Rosendo Zaragoza MD    LAST OFFICE VISIT:05/01/2025    LAST REFILL PER CHART:  Medication:lisinopril-hydroCHLOROthiazide (PRINZIDE;ZESTORETIC) 10-12.5 MG per tablet   Ordered On:10/08/2024  Instructions:Take 1 tablet by mouth 2 times daily   Dispense:180 tablets  Refills:2    Future Appointments   Date Time Provider Department Center   8/26/2025 10:30 AM Jarred King [facility] HR [facility] DEP   10/28/2025 10:00 AM IOC LAB VISIT [facility] DEP   11/4/2025 10:20 AM Rosendo Zaragoza MD [facility] DEP

## 2025-08-26 ENCOUNTER — CLINICAL DOCUMENTATION (OUTPATIENT)
Facility: CLINIC | Age: 70
End: 2025-08-26

## 2025-08-26 ENCOUNTER — PHARMACY VISIT (OUTPATIENT)
Facility: CLINIC | Age: 70
End: 2025-08-26

## 2025-08-26 DIAGNOSIS — N18.31 TYPE 2 DIABETES MELLITUS WITH STAGE 3A CHRONIC KIDNEY DISEASE, WITH LONG-TERM CURRENT USE OF INSULIN (HCC): Primary | ICD-10-CM

## 2025-08-26 DIAGNOSIS — E11.22 TYPE 2 DIABETES MELLITUS WITH STAGE 3A CHRONIC KIDNEY DISEASE, WITH LONG-TERM CURRENT USE OF INSULIN (HCC): Primary | ICD-10-CM

## 2025-08-26 DIAGNOSIS — Z79.4 TYPE 2 DIABETES MELLITUS WITH STAGE 3A CHRONIC KIDNEY DISEASE, WITH LONG-TERM CURRENT USE OF INSULIN (HCC): Primary | ICD-10-CM

## 2025-08-26 RX ORDER — INSULIN DEGLUDEC 200 U/ML
36 INJECTION, SOLUTION SUBCUTANEOUS NIGHTLY
Qty: 6 ADJUSTABLE DOSE PRE-FILLED PEN SYRINGE | Refills: 11 | Status: SHIPPED | OUTPATIENT
Start: 2025-08-26 | End: 2025-08-26 | Stop reason: DRUGHIGH

## 2025-08-26 RX ORDER — INSULIN DEGLUDEC 200 U/ML
36 INJECTION, SOLUTION SUBCUTANEOUS EVERY MORNING
Qty: 6 ADJUSTABLE DOSE PRE-FILLED PEN SYRINGE | Refills: 11 | Status: SHIPPED | OUTPATIENT
Start: 2025-08-26